# Patient Record
Sex: MALE | Race: WHITE | NOT HISPANIC OR LATINO | Employment: OTHER | URBAN - METROPOLITAN AREA
[De-identification: names, ages, dates, MRNs, and addresses within clinical notes are randomized per-mention and may not be internally consistent; named-entity substitution may affect disease eponyms.]

---

## 2017-01-06 ENCOUNTER — TRANSFERRED RECORDS (OUTPATIENT)
Dept: HEALTH INFORMATION MANAGEMENT | Facility: CLINIC | Age: 72
End: 2017-01-06

## 2017-01-09 ENCOUNTER — OFFICE VISIT (OUTPATIENT)
Dept: UROLOGY | Facility: CLINIC | Age: 72
End: 2017-01-09

## 2017-01-09 ENCOUNTER — TRANSFERRED RECORDS (OUTPATIENT)
Dept: HEALTH INFORMATION MANAGEMENT | Facility: CLINIC | Age: 72
End: 2017-01-09

## 2017-01-09 VITALS
WEIGHT: 164 LBS | SYSTOLIC BLOOD PRESSURE: 128 MMHG | BODY MASS INDEX: 23.48 KG/M2 | HEIGHT: 70 IN | HEART RATE: 60 BPM | DIASTOLIC BLOOD PRESSURE: 72 MMHG

## 2017-01-09 DIAGNOSIS — R39.9 LOWER URINARY TRACT SYMPTOMS (LUTS): Primary | ICD-10-CM

## 2017-01-09 LAB — EJECTION FRACTION: 60

## 2017-01-09 ASSESSMENT — PAIN SCALES - GENERAL: PAINLEVEL: NO PAIN (0)

## 2017-01-09 NOTE — MR AVS SNAPSHOT
After Visit Summary   1/9/2017    Chester Palacios    MRN: [id number]           Patient Information     Date Of Birth          1945        Visit Information        Provider Department      1/9/2017 8:30 AM Guillemro Robb DO [facility] and [facility] for Prostate and Urologic Cancers        Today's Diagnoses     Lower urinary tract symptoms (LUTS)    -  1       Care Instructions    Follow up on 6/12/17. Arrive to clinic with a full bladder for a urine test    It was a pleasure meeting with you today.  Thank you for allowing me and my team the privilege of caring for you today.  YOU are the reason we are here, and I truly hope we provided you with the excellent service you deserve.  Please let us know if there is anything else we can do for you so that we can be sure you are leaving completely satisfied with your care experience.                Follow-ups after your visit        Follow-up notes from your care team     Return in about 6 months (around 7/9/2017).      Your next 10 appointments already scheduled     Jun 12, 2017  8:00 AM   (Arrive by 7:45 AM)   Return Visit with Guillermo Robb DO   [facility] for Prostate and Urologic Cancers ([facility])    [address] [place] [id number] [phone]              Who to contact     Please call your clinic at [phone] to:    Ask questions about your health    Make or cancel appointments    Discuss your medicines    Learn about your test results    Speak to your doctor   If you have compliments or concerns about an experience at your clinic, or if you wish to file a complaint, please contact [facility] Patient Relations at [phone] or email us at [email or web address]         Additional Information About Your Visit        MyChart Information     [employer] gives you secure access to your electronic health record. If you see a  "primary care provider, you can also send messages to your care team and make appointments. If you have questions, please call your primary care clinic.  If you do not have a primary care provider, please call [phone] and they will assist you.      [employer] is an electronic gateway that provides easy, online access to your medical records. With [employer], you can request a clinic appointment, read your test results, renew a prescription or communicate with your care team.     To access your existing account, please contact your [facility] or call [phone] for assistance.        Care EveryWhere ID     This is your Care EveryWhere ID. This could be used by other organizations to access your [facility] medical records  [id number]        Your Vitals Were     Pulse Height BMI (Body Mass Index)             60 1.778 m (5' 10\") 23.53 kg/m2          Blood Pressure from Last 3 Encounters:   01/09/17 128/72   11/07/16 134/70   10/10/16 121/77    Weight from Last 3 Encounters:   01/09/17 74.39 kg (164 lb)   11/07/16 74.39 kg (164 lb)   10/10/16 74.844 kg (165 lb)              We Performed the Following     COMPLEX UROFLOWMETRY     POST-VOID RESIDUAL BLADDER SCAN        Primary Care Provider Office Phone # Fax #    Dariel Jack [phone]       [facility] [address] [facility] [address]        Thank you!     Thank you for choosing [facility] AND [facility] FOR PROSTATE AND UROLOGIC CANCERS  for your care. Our goal is always to provide you with excellent care. Hearing back from our patients is one way we can continue to improve our services. Please take a few minutes to complete the written survey that you may receive in the mail after your visit with us. Thank you!             Your Updated Medication List - Protect others around you: Learn how to safely use, store and throw away your medicines at www.disposemymeds.org.          This list is " accurate as of: 1/9/17  9:43 AM.  Always use your most recent med list.                   Brand Name Dispense Instructions for use    aspirin 81 MG tablet      Take 81 mg by mouth daily       busPIRone 10 MG tablet    BUSPAR     Take 5 tablets by mouth daily.       diclofenac 1 % Gel topical gel    VOLTAREN    100 g    Place onto the skin as needed for moderate pain Patient uses prn on hands for playing [employer].       DOXEPIN HCL PO      Take 75 mg by mouth At Bedtime       simvastatin 40 MG tablet    ZOCOR    90 tablet    TAKE 1 TABLET EVERY NIGHT AT BEDTIME       solifenacin 5 MG tablet    VESICARE    30 tablet    Take 1 tablet (5 mg) by mouth daily       tamsulosin 0.4 MG capsule    FLOMAX    30 capsule    Take 1 capsule (0.4 mg) by mouth daily

## 2017-01-09 NOTE — PROGRESS NOTES
Urology Consultation - F/U  Name: Chester Palacios    MRN: [id number]   YOB: 1945                 Chief Complaint:   LUTS/Nocturia/Urgency          History of Present Illness:   Interval history:  Better flow and less urgency episodes after starting vesicare and flomax 10/2016.    He continues to wake up several times a night to void, however, it's more likely his issues sleeping rather than his desire to void.     Does still have some urinary frequency, although not terribly bothersome.    His voiding diary revealed small volume voids ( cc), and he voided about 4-5 times overnight. He did not record his voids during the day.    Prior history 10/10/16:  Mr. Chester Palacios is a 71 year old male seen in consultation for LUTS    Slow stream started 1 year ago, gradually, not noticeable until PCP brought issue up. He has a limited memory of his previous urologic history, but likely had LUTS back in 5028-2851 and an outside urologist in [place] treated him with an office TUNA procedure. He was not placed on medications before TUNA. No major issues from 1181-7153/15.    For the past 2 years he's had gradually worsening Q2H frequency, frequent urgency with weekly episodes of urge urinary incontinence. Nocturia 4-5x/night, small volume. Due to lack of sleep is taking nightly doxepin (was on trazodone before - developed self resolving priapism)    +weak stream, occasional feeling of incomplete emptying, nocturia 4-5 (sm volume)  -hesitancy, intermittency, dysuria, hematuria    AUASS: 20/35 QOL: 4  MENDEZ: Normal/25         Past Medical History:   No past medical history on file.  None         Past Surgical History:     Past Surgical History   Procedure Laterality Date     Prostate surgery  2007     TUNA for BPH     Orthopedic surgery       R elbow Orif     Hernia repair  1985     Inguinal     Tonsillectomy       age 5     Joint replacement, hip rt/lt Right 1/16     Joint Replacement Hip RT/LT  "           Social History:     Social History   Substance Use Topics     Smoking status: Never Smoker      Smokeless tobacco: Never Used     Alcohol Use: Yes      Comment: 2-3 drinks weekly            Family History:     Family History   Problem Relation Age of Onset     Genetic Disorder Brother      +HFE gene mutation, but does not have hemochromatosis, but has phlebotomies     HEART DISEASE Father       age 56; had chest pain,  in the hospital soon after; was a smoker     Blood Disease Mother      hemolytic anemia;  age 85            Allergies:     Allergies   Allergen Reactions     Animal Dander      cats            Medications:     Current Outpatient Prescriptions   Medication Sig     solifenacin (VESICARE) 5 MG tablet Take 1 tablet (5 mg) by mouth daily     tamsulosin (FLOMAX) 0.4 MG capsule Take 1 capsule (0.4 mg) by mouth daily     simvastatin (ZOCOR) 40 MG tablet TAKE 1 TABLET EVERY NIGHT AT BEDTIME     DOXEPIN HCL PO Take 75 mg by mouth At Bedtime     aspirin 81 MG tablet Take 81 mg by mouth daily     diclofenac (VOLTAREN) 1 % GEL Place onto the skin as needed for moderate pain Patient uses prn on hands for playing [employer].     busPIRone (BUSPAR) 10 MG tablet Take 5 tablets by mouth daily.     No current facility-administered medications for this visit.             Review of Systems:    ROS: 10 point ROS neg other than the symptoms noted above in the HPI and PMH.          Physical Exam:   B/P: 121/77, T: Data Unavailable, P: 60, R: Data Unavailable  Estimated body mass index is 23.53 kg/(m^2) as calculated from the following:    Height as of this encounter: 1.778 m (5' 10\").    Weight as of this encounter: 74.39 kg (164 lb).  General: age-appropriate appearing male in NAD. thin body habitus.  HEENT: Head AT/NC, EOMI, CN Grossly intact  Resp: unlabored, no audible wheezes  Back: bony spine is non-tender, flanks are nontender  Abdomen: no obesity , soft, non-distended, non-tender. No organomegaly. " Surgical scars include: well healed b/l IH repairs  : testicles normal without atrophy or masses, no hernias and penis normal without urethral discharge  Rectal exam: mildly enlarged gland, smooth, no nodules  LE: warm and well perfused, no edema.   Neuro: grossly intact  Motor: excellent strength throughout  Skin: clear of rashes or ecchymoses.        Labs:    All laboratory data reviewed with patient  UA 8/29 N-W0-2R0-2    Urinary Flow Rate (10/2016)  Peak Flow: 7.9 mL/s  Average Flow: 3.2 mL/s  Voided (mL): 110 mL  Residual Volume by Ultrasound: 30 mL  Character of Curve: Plateau    Urinary Flow Rate (1/9/2017)  Peak Flow: 7.8 mL/s  Average Flow: 3.9 mL/s  Voided (mL): 134 mL  Residual Volume by Ultrasound: 110 mL        Imaging:    none           Assessment and Plan:   71 year old male with LUTS, urgency/UUI, nocturia.     -Will work up further for outlet obstruction if develops incomplete emptying, worsening frequency, or develops obstructive voiding symptoms, which are not currently present (cystoscopy in office).  -Needs further evaluation to help with his issues sleeping  -Continue flomax/vesicare  -Uroflow unchanged, but he feels like his flow is much better. Acceptable PVR of 110cc    F/U: 6 months with Julianna Cummings PA-C    I spent 30 minutes with the patient discussing the above mentioned findings and reviewing the assessment and plan, greater than 50% of which was spent on counseling. All questions were answered to the patients satisfaction.    Guillermo Robb DO  Fellow/Instructor  Department of Urology  1/9/17

## 2017-01-09 NOTE — PATIENT INSTRUCTIONS
Follow up on 6/12/17. Arrive to clinic with a full bladder for a urine test    It was a pleasure meeting with you today.  Thank you for allowing me and my team the privilege of caring for you today.  YOU are the reason we are here, and I truly hope we provided you with the excellent service you deserve.  Please let us know if there is anything else we can do for you so that we can be sure you are leaving completely satisfied with your care experience.

## 2017-01-09 NOTE — Clinical Note
1/9/2017       RE: Chester Palacios  [address]  [facility] [address]     Dear Colleague,    Thank you for referring your patient, Chester Palacios, to the [facility] UROLOGY AND INST FOR PROSTATE AND UROLOGIC CANCERS at [facility]. Please see a copy of my visit note below.    Urology Consultation - F/U  Name: Chester Palacios    MRN: [id number]   YOB: 1945                 Chief Complaint:   LUTS/Nocturia/Urgency          History of Present Illness:   Interval history:  Better flow and less urgency episodes after starting vesicare and flomax 10/2016.    He continues to wake up several times a night to void, however, it's more likely his issues sleeping rather than his desire to void.     Does still have some urinary frequency, although not terribly bothersome.    His voiding diary revealed small volume voids ( cc), and he voided about 4-5 times overnight. He did not record his voids during the day.    Prior history 10/10/16:  Mr. Chester Palacios is a 71 year old male seen in consultation for LUTS    Slow stream started 1 year ago, gradually, not noticeable until PCP brought issue up. He has a limited memory of his previous urologic history, but likely had LUTS back in 3628-6571 and an outside urologist in [place] treated him with an office TUNA procedure. He was not placed on medications before TUNA. No major issues from 1061-1106/15.    For the past 2 years he's had gradually worsening Q2H frequency, frequent urgency with weekly episodes of urge urinary incontinence. Nocturia 4-5x/night, small volume. Due to lack of sleep is taking nightly doxepin (was on trazodone before - developed self resolving priapism)    +weak stream, occasional feeling of incomplete emptying, nocturia 4-5 (sm volume)  -hesitancy, intermittency, dysuria, hematuria    AUASS: 20/35 QOL: 4  MENDEZ: Normal/25         Past Medical History:   No past medical history on  "file.  None         Past Surgical History:     Past Surgical History   Procedure Laterality Date     Prostate surgery       TUNA for BPH     Orthopedic surgery       R elbow Orif     Hernia repair  1985     Inguinal     Tonsillectomy       age 5     Joint replacement, hip rt/lt Right      Joint Replacement Hip RT/LT            Social History:     Social History   Substance Use Topics     Smoking status: Never Smoker      Smokeless tobacco: Never Used     Alcohol Use: Yes      Comment: 2-3 drinks weekly            Family History:     Family History   Problem Relation Age of Onset     Genetic Disorder Brother      +HFE gene mutation, but does not have hemochromatosis, but has phlebotomies     HEART DISEASE Father       age 56; had chest pain,  in the hospital soon after; was a smoker     Blood Disease Mother      hemolytic anemia;  age 85            Allergies:     Allergies   Allergen Reactions     Animal Dander      cats            Medications:     Current Outpatient Prescriptions   Medication Sig     solifenacin (VESICARE) 5 MG tablet Take 1 tablet (5 mg) by mouth daily     tamsulosin (FLOMAX) 0.4 MG capsule Take 1 capsule (0.4 mg) by mouth daily     simvastatin (ZOCOR) 40 MG tablet TAKE 1 TABLET EVERY NIGHT AT BEDTIME     DOXEPIN HCL PO Take 75 mg by mouth At Bedtime     aspirin 81 MG tablet Take 81 mg by mouth daily     diclofenac (VOLTAREN) 1 % GEL Place onto the skin as needed for moderate pain Patient uses prn on hands for playing guitar.     busPIRone (BUSPAR) 10 MG tablet Take 5 tablets by mouth daily.     No current facility-administered medications for this visit.             Review of Systems:    ROS: 10 point ROS neg other than the symptoms noted above in the HPI and PMH.          Physical Exam:   B/P: 121/77, T: Data Unavailable, P: 60, R: Data Unavailable  Estimated body mass index is 23.53 kg/(m^2) as calculated from the following:    Height as of this encounter: 1.778 m (5' 10\").   "  Weight as of this encounter: 74.39 kg (164 lb).  General: age-appropriate appearing male in NAD. thin body habitus.  HEENT: Head AT/NC, EOMI, CN Grossly intact  Resp: unlabored, no audible wheezes  Back: bony spine is non-tender, flanks are nontender  Abdomen: no obesity , soft, non-distended, non-tender. No organomegaly. Surgical scars include: well healed b/l IH repairs  : testicles normal without atrophy or masses, no hernias and penis normal without urethral discharge  Rectal exam: mildly enlarged gland, smooth, no nodules  LE: warm and well perfused, no edema.   Neuro: grossly intact  Motor: excellent strength throughout  Skin: clear of rashes or ecchymoses.        Labs:    All laboratory data reviewed with patient  UA 8/29 N-W0-2R0-2    Urinary Flow Rate (10/2016)  Peak Flow: 7.9 mL/s  Average Flow: 3.2 mL/s  Voided (mL): 110 mL  Residual Volume by Ultrasound: 30 mL  Character of Curve: Plateau    Urinary Flow Rate (1/9/2017)  Peak Flow: 7.8 mL/s  Average Flow: 3.9 mL/s  Voided (mL): 134 mL  Residual Volume by Ultrasound: 110 mL        Imaging:    none           Assessment and Plan:   71 year old male with LUTS, urgency/UUI, nocturia.     -Will work up further for outlet obstruction if develops incomplete emptying, worsening frequency, or develops obstructive voiding symptoms, which are not currently present (cystoscopy in office).  -Needs further evaluation to help with his issues sleeping  -Continue flomax/vesicare  -Uroflow unchanged, but he feels like his flow is much better. Acceptable PVR of 110cc    F/U: 6 months with Julianna Cummings PA-C    I spent 30 minutes with the patient discussing the above mentioned findings and reviewing the assessment and plan, greater than 50% of which was spent on counseling. All questions were answered to the patients satisfaction.    Guillermo Robb DO  Fellow/Instructor  Department of Urology  1/9/17         Again, thank you for allowing me to participate in the care of  your patient.      Sincerely,    Guillermo Robb, DO

## 2017-01-09 NOTE — NURSING NOTE
Chief Complaint   Patient presents with     RECHECK     LUTS     PVR was obtained via ultrasound    Benito LLAA

## 2017-01-12 ENCOUNTER — TELEPHONE (OUTPATIENT)
Dept: FAMILY MEDICINE | Facility: CLINIC | Age: 72
End: 2017-01-12

## 2017-01-12 ENCOUNTER — TELEPHONE (OUTPATIENT)
Dept: UROLOGY | Facility: CLINIC | Age: 72
End: 2017-01-12

## 2017-01-12 DIAGNOSIS — R73.01 IFG (IMPAIRED FASTING GLUCOSE): ICD-10-CM

## 2017-01-12 DIAGNOSIS — E78.5 HYPERLIPIDEMIA WITH TARGET LDL LESS THAN 130: Primary | ICD-10-CM

## 2017-01-12 RX ORDER — ATORVASTATIN CALCIUM 40 MG/1
40 TABLET, FILM COATED ORAL DAILY
Qty: 90 TABLET | Refills: 3 | Status: SHIPPED | OUTPATIENT
Start: 2017-01-12 | End: 2017-12-24

## 2017-01-12 NOTE — TELEPHONE ENCOUNTER
Patient called to inform PCP he had MRI and appt with cardiologist. States he may need to change from simvastatin to Lipitor.   Please review care everywhere notes and advise if change appropriate.

## 2017-01-12 NOTE — TELEPHONE ENCOUNTER
Reason for Call:  Call back    Detailed comments: Patient saw a Cardiologist and the test came back normal. Patient states that it did show some blockage and the Cardiologist suggests he gets prescribed Lipitor. Please call to discuss and let patient know if he needs an appointment. Patient states that it would be replacing his Simvastatin.     Phone Number Patient can be reached at: Home number on file [phone] (home)    Best Time: Any    Can we leave a detailed message on this number? YES    Call taken on 1/12/2017 at 1:23 PM by KALYN CHASE

## 2017-01-12 NOTE — TELEPHONE ENCOUNTER
Noted. Called [facility] for records.   Rx for Lipitor sent with dose below. D/c simvastatin.  Patient was informed of above.

## 2017-01-12 NOTE — TELEPHONE ENCOUNTER
FYI:         I, or any provider, can only review care everywhere during a live patient visit.                                         I did receive a faxed letter from Cardiology; the cardiac MRI had not been done yet.      I agree with switching to atorvastatin 40 mg per day. Please let the patient know.

## 2017-01-12 NOTE — TELEPHONE ENCOUNTER
Dr. Robb,    This patient called triage wondering if he could be worked up and have a cystoscopy to determine what surgery he needs to have his symptoms improved. Patient also stated that Vesicare is over $200 per month. Is there something he could try that is more affordable?  Please advise.    -Deisy Avila LPN

## 2017-01-24 DIAGNOSIS — N32.81 OVERACTIVE BLADDER: Primary | ICD-10-CM

## 2017-01-24 RX ORDER — TAMSULOSIN HYDROCHLORIDE 0.4 MG/1
0.4 CAPSULE ORAL DAILY
Qty: 30 CAPSULE | Refills: 1 | Status: SHIPPED | OUTPATIENT
Start: 2017-01-24 | End: 2017-04-05

## 2017-01-30 ENCOUNTER — PRE VISIT (OUTPATIENT)
Dept: UROLOGY | Facility: CLINIC | Age: 72
End: 2017-01-30

## 2017-02-08 ENCOUNTER — OFFICE VISIT (OUTPATIENT)
Dept: UROLOGY | Facility: CLINIC | Age: 72
End: 2017-02-08

## 2017-02-08 VITALS
BODY MASS INDEX: 22.9 KG/M2 | HEART RATE: 55 BPM | SYSTOLIC BLOOD PRESSURE: 127 MMHG | HEIGHT: 70 IN | WEIGHT: 160 LBS | DIASTOLIC BLOOD PRESSURE: 70 MMHG

## 2017-02-08 DIAGNOSIS — R39.9 LOWER URINARY TRACT SYMPTOMS (LUTS): Primary | ICD-10-CM

## 2017-02-08 DIAGNOSIS — R35.0 URINARY FREQUENCY: ICD-10-CM

## 2017-02-08 RX ORDER — CEFAZOLIN SODIUM 1 G/3ML
1 INJECTION, POWDER, FOR SOLUTION INTRAMUSCULAR; INTRAVENOUS SEE ADMIN INSTRUCTIONS
Status: CANCELLED | OUTPATIENT
Start: 2017-02-08

## 2017-02-08 ASSESSMENT — PAIN SCALES - GENERAL: PAINLEVEL: NO PAIN (0)

## 2017-02-08 NOTE — NURSING NOTE
Invasive Procedure Safety Checklist:    Procedure: Cystoscopy    Action: Complete sections and checkboxes as appropriate.    Pre-procedure:  1. Patient ID Verified with 2 identifiers (Radha and  or MRN) : YES    2. Procedure and site verified with patient/designee (when able) : YES    3. Accurate consent documentation in medical record : YES    4. H&P (or appropriate assessment) documented in medical record : NO  H&P must be up to 30 days prior to procedure an updated within 24 hours of                 Procedure as applicable.     5. Relevant diagnostic and radiology test results appropriately labeled and displayed as applicable : NO    6. Blood products, implants, devices, and/or special equipment available for the procedure as applicable : NO    7. Procedure site(s) marked with provider initials [Exclusions: ] : NO    8. Marking not required. Reason : Yes  Procedure does not require site marking    Time Out:     Time-Out performed immediately prior to starting procedure, including verbal and active participation of all team members addressing: YES    1. Correct patient identity.  2. Confirmed that the correct side and site are marked.  3. An accurate procedure to be done.  4. Agreement on the procedure to be done.  5. Correct patient position.  6. Relevant images and results are properly labeled and appropriately displayed.  7. The need to administer antibiotics or fluids for irrigation purposes during the procedure as applicable.  8. Safety precautions based on patient history or medication use.    During Procedure: Verification of correct person, site, and procedure occurs any time the responsibility for care of the patient is transferred to another member of the care team.    SPIKE Quinn

## 2017-02-08 NOTE — Clinical Note
2017       RE: Chester Palacios  [address]  [facility] [place] [address]     Dear Colleague,    Thank you for referring your patient, Chester Palacios, to the [facility] FOR PROSTATE AND UROLOGIC CANCERS at [facility]. Please see a copy of my visit note below.    Office Visit Note      UROLOGIC DIAGNOSES:   Frequency, urgency, nocturia     CURRENT INTERVENTIONS:       HISTORY:   Patient presents for follow up for lower urinary tract symptoms.   He is s/p TUNA ~ ten years ago.   Currently taking tamsulosin and vesicare for lower urinary tract symptoms.   Patient notes some improvement but would like to consider outlet procedure for further improvement in symptoms.       Uroflow today:  Qmax 10.7 ml/s    PVR was 150ml       We discussed options of Rezum, Uro-lift, PVP, TURP particularly in light of previous TUNA.   We also discussed work up prior to outlet procedure including cystoscopy +/- UDS.     Patient opts for cystoscopy today.         PAST MEDICAL HISTORY: History reviewed. No pertinent past medical history.    PAST SURGICAL HISTORY:   Past Surgical History   Procedure Laterality Date     Prostate surgery       TUNA for BPH     Orthopedic surgery       R elbow Orif     Hernia repair       Inguinal     Tonsillectomy       age 5     Joint replacement, hip rt/lt Right      Joint Replacement Hip RT/LT       FAMILY HISTORY:   Family History   Problem Relation Age of Onset     Genetic Disorder Brother      +HFE gene mutation, but does not have hemochromatosis, but has phlebotomies     HEART DISEASE Father       age 56; had chest pain,  in the hospital soon after; was a smoker     Blood Disease Mother      hemolytic anemia;  age 85       SOCIAL HISTORY:   Social History   Substance Use Topics     Smoking status: Never Smoker      Smokeless tobacco: Never Used     Alcohol Use: Yes      Comment: 2-3 drinks weekly       Current  "Outpatient Prescriptions   Medication     tamsulosin (FLOMAX) 0.4 MG capsule     atorvastatin (LIPITOR) 40 MG tablet     solifenacin (VESICARE) 5 MG tablet     DOXEPIN HCL PO     aspirin 81 MG tablet     diclofenac (VOLTAREN) 1 % GEL     busPIRone (BUSPAR) 10 MG tablet     No current facility-administered medications for this visit.         PHYSICAL EXAM:    /70 mmHg  Pulse 55  Ht 1.778 m (5' 10\")  Wt 72.576 kg (160 lb)  BMI 22.96 kg/m2    HEENT: Normocephalic and atraumatic   Cardiac: Not done  Back/Flank: Not done  CNS/PNS: Not done  Respiratory: Normal non-labored breathing  Abdomen: Soft nontender and nondistended  Peripheral Vascular: Not done  Mental Status: Not done    Penis: Not done  Scrotal Skin: Not done  Testicles: Not done  Epididymis: Not done  Digital Rectal Exam:     Cystoscopy:     Cystomale    February 8, 2017 CYSTOSCOPY:  The patient was brought to the procedures room where he was placed in the supine position.  He was prepped and draped in a sterile fashion.  A #16-Slovenian flexible cystoscope was introduced into the urinary bladder.  The anterior urethra and membranous urethra were negative.  The prostatic urethra was obstructing with some enlargement of the lateral lobes.  Within the urinary bladder, there was no evidence of stones, tumors, or growths.  The ureteral orifices were well visualized bilaterally and found to have clear efflux of urine.  There was no evidence of recurrence.  The patient had grade 1 trabeculation.    On retroflex view, no lesions were seen.       Imaging: None    Urinalysis: UA RESULTS:  Recent Labs   Lab Test  08/29/16   1020   COLOR  Yellow   APPEARANCE  Clear   URINEGLC  Negative   URINEBILI  Negative   URINEKETONE  Negative   SG  1.025   UBLD  Trace*   URINEPH  6.0   PROTEIN  Negative   UROBILINOGEN  0.2   NITRITE  Negative   LEUKEST  Negative   RBCU  O - 2   WBCU  O - 2       PSA:     Post Void Residual: 150ml    Other labs: None " today      IMPRESSION:  72 y/o M with lower urinary tract symptoms      PLAN:  Will schedule for TURP next available, after further discussion.   Noted that PVP would likely worsen patient's irritative symptoms in the short term. Rezum is an option but unclear the benefit as patient had previous TUNA       Total Time: 15 minutes                                     Total in Consultation: greater than 50%   The time noted above was separate from the time spent performing cystoscopy.                    Again, thank you for allowing me to participate in the care of your patient.      Sincerely,    Rosa Macias MD

## 2017-02-08 NOTE — MR AVS SNAPSHOT
After Visit Summary   2/8/2017    Chester Palacios    MRN: [id number]           Patient Information     Date Of Birth          1945        Visit Information        Provider Department      2/8/2017 8:45 AM Rosa Macias MD [facility] for Prostate and Urologic Cancers        Today's Diagnoses     Lower urinary tract symptoms (LUTS)    -  1     Urinary frequency           Care Instructions    Schedule surgery with Dr. Macias.    It was a pleasure meeting with you today.  Thank you for allowing me and my team the privilege of caring for you today.  YOU are the reason we are here, and I truly hope we provided you with the excellent service you deserve.  Please let us know if there is anything else we can do for you so that we can be sure you are leaving completely satisfied with your care experience.      Queta Sotomayor LORENA        Follow-ups after your visit        Additional Services     PAC Visit Referral (For [facility] Only)       Does this visit require an Anesthesia consult?  No -  If this visit is not for evaluation for co-morbidity (such as patient request due to anxiety), what is the purpose of the visit?: H and P     H&P done by:  N/A      Please be aware that coverage of these services is subject to the terms and limitations of your health insurance plan.  Call member services at your health plan with any benefit or coverage questions.      Please bring the following to your appointment:  >>   Any x-rays, CTs or MRIs which have been performed.  Contact the facility where they were done to arrange for  prior to your scheduled appointment.  Any new CT, MRI or other procedures ordered by your specialist must be performed at a [facility] facility or coordinated by your clinic's referral office.    >>   List of current medications  >>   This referral request   >>   Any documents/labs given to you for this referral                  Your next 10 appointments  already scheduled     Apr 03, 2017  1:30 PM   Office Visit with Dariel Jack MD   [facility]    [address]  [address]  [facility] [address] [phone]           Bring a current list of meds and any records pertaining to this visit.  For Physicals, please bring immunization records and any forms needing to be filled out.  Please arrive 10 minutes early to complete paperwork.              Who to contact     Please call your clinic at [phone] to:    Ask questions about your health    Make or cancel appointments    Discuss your medicines    Learn about your test results    Speak to your doctor   If you have compliments or concerns about an experience at your clinic, or if you wish to file a complaint, please contact [facility] Patient Relations at [phone] or email us at [email or web address]         Additional Information About Your Visit        [employer] Makeup Information     [employer] gives you secure access to your electronic health record. If you see a primary care provider, you can also send messages to your care team and make appointments. If you have questions, please call your primary care clinic.  If you do not have a primary care provider, please call [phone] and they will assist you.      [employer] is an electronic gateway that provides easy, online access to your medical records. With [employer], you can request a clinic appointment, read your test results, renew a prescription or communicate with your care team.     To access your existing account, please contact your [facility] or call [phone] for assistance.        Care EveryWhere ID     This is your Care EveryWhere ID. This could be used by other organizations to access your [facility] medical records  [id number]        Your Vitals Were     Pulse Height BMI (Body Mass Index)        "      55 1.778 m (5' 10\") 22.96 kg/m2          Blood Pressure from Last 3 Encounters:   02/08/17 127/70   01/09/17 128/72   11/07/16 134/70    Weight from Last 3 Encounters:   02/08/17 72.576 kg (160 lb)   01/09/17 74.39 kg (164 lb)   11/07/16 74.39 kg (164 lb)              We Performed the Following     COMPLEX UROFLOWMETRY     PAC Visit Referral (For [facility] Only)     Shelly-Operative Worksheet  (Urology General)     POST-VOID RESIDUAL BLADDER SCAN        Primary Care Provider Office Phone # Fax #    Dariel Jack [phone]       [facility]NE[facility] [place] [facility] [address]  [facility] [address]        Thank you!     Thank you for choosing [facility] UROLOGY AND [facility] FOR PROSTATE AND UROLOGIC CANCERS  for your care. Our goal is always to provide you with excellent care. Hearing back from our patients is one way we can continue to improve our services. Please take a few minutes to complete the written survey that you may receive in the mail after your visit with us. Thank you!             Your Updated Medication List - Protect others around you: Learn how to safely use, store and throw away your medicines at www.disposemymeds.org.          This list is accurate as of: 2/8/17 10:31 AM.  Always use your most recent med list.                   Brand Name Dispense Instructions for use    aspirin 81 MG tablet      Take 81 mg by mouth daily       atorvastatin 40 MG tablet    LIPITOR    90 tablet    Take 1 tablet (40 mg) by mouth daily       busPIRone 10 MG tablet    BUSPAR     Take 5 tablets by mouth daily.       diclofenac 1 % Gel topical gel    VOLTAREN    100 g    Place onto the skin as needed for moderate pain Patient uses prn on hands for playing Pulse 8.       DOXEPIN HCL PO      Take 75 mg by mouth At Bedtime       solifenacin 5 MG tablet    VESICARE    30 tablet    Take 1 tablet (5 mg) by mouth daily       tamsulosin 0.4 MG capsule    FLOMAX    30 capsule    Take 1 capsule (0.4 mg) by mouth daily "

## 2017-02-08 NOTE — NURSING NOTE
"Chief Complaint   Patient presents with     RECHECK     Follow up- LUTS.  Discuss surgical options       Initial Ht 1.778 m (5' 10\")  Wt 72.576 kg (160 lb)  BMI 22.96 kg/m2 Estimated body mass index is 22.96 kg/(m^2) as calculated from the following:    Height as of this encounter: 1.778 m (5' 10\").    Weight as of this encounter: 72.576 kg (160 lb).  Medication Reconciliation: complete     SPIKE Quinn    "

## 2017-02-08 NOTE — PROGRESS NOTES
Office Visit Note      UROLOGIC DIAGNOSES:   Frequency, urgency, nocturia     CURRENT INTERVENTIONS:       HISTORY:   Patient presents for follow up for lower urinary tract symptoms.   He is s/p TUNA ~ ten years ago.   Currently taking tamsulosin and vesicare for lower urinary tract symptoms.   Patient notes some improvement but would like to consider outlet procedure for further improvement in symptoms.       Uroflow today:  Qmax 10.7 ml/s    PVR was 150ml       We discussed options of Rezum, Uro-lift, PVP, TURP particularly in light of previous TUNA.   We also discussed work up prior to outlet procedure including cystoscopy +/- UDS.     Patient opts for cystoscopy today.         PAST MEDICAL HISTORY: History reviewed. No pertinent past medical history.    PAST SURGICAL HISTORY:   Past Surgical History   Procedure Laterality Date     Prostate surgery       TUNA for BPH     Orthopedic surgery       R elbow Orif     Hernia repair  1985     Inguinal     Tonsillectomy       age 5     Joint replacement, hip rt/lt Right      Joint Replacement Hip RT/LT       FAMILY HISTORY:   Family History   Problem Relation Age of Onset     Genetic Disorder Brother      +HFE gene mutation, but does not have hemochromatosis, but has phlebotomies     HEART DISEASE Father       age 56; had chest pain,  in the hospital soon after; was a smoker     Blood Disease Mother      hemolytic anemia;  age 85       SOCIAL HISTORY:   Social History   Substance Use Topics     Smoking status: Never Smoker      Smokeless tobacco: Never Used     Alcohol Use: Yes      Comment: 2-3 drinks weekly       Current Outpatient Prescriptions   Medication     tamsulosin (FLOMAX) 0.4 MG capsule     atorvastatin (LIPITOR) 40 MG tablet     solifenacin (VESICARE) 5 MG tablet     DOXEPIN HCL PO     aspirin 81 MG tablet     diclofenac (VOLTAREN) 1 % GEL     busPIRone (BUSPAR) 10 MG tablet     No current facility-administered medications for this visit.  "        PHYSICAL EXAM:    /70 mmHg  Pulse 55  Ht 1.778 m (5' 10\")  Wt 72.576 kg (160 lb)  BMI 22.96 kg/m2    HEENT: Normocephalic and atraumatic   Cardiac: Not done  Back/Flank: Not done  CNS/PNS: Not done  Respiratory: Normal non-labored breathing  Abdomen: Soft nontender and nondistended  Peripheral Vascular: Not done  Mental Status: Not done    Penis: Not done  Scrotal Skin: Not done  Testicles: Not done  Epididymis: Not done  Digital Rectal Exam:     Cystoscopy:     Alvin    February 8, 2017 CYSTOSCOPY:  The patient was brought to the procedures room where he was placed in the supine position.  He was prepped and draped in a sterile fashion.  A #16-Gambian flexible cystoscope was introduced into the urinary bladder.  The anterior urethra and membranous urethra were negative.  The prostatic urethra was obstructing with some enlargement of the lateral lobes.  Within the urinary bladder, there was no evidence of stones, tumors, or growths.  The ureteral orifices were well visualized bilaterally and found to have clear efflux of urine.  There was no evidence of recurrence.  The patient had grade 1 trabeculation.    On retroflex view, no lesions were seen.       Imaging: None    Urinalysis: UA RESULTS:  Recent Labs   Lab Test  08/29/16   1020   COLOR  Yellow   APPEARANCE  Clear   URINEGLC  Negative   URINEBILI  Negative   URINEKETONE  Negative   SG  1.025   UBLD  Trace*   URINEPH  6.0   PROTEIN  Negative   UROBILINOGEN  0.2   NITRITE  Negative   LEUKEST  Negative   RBCU  O - 2   WBCU  O - 2       PSA:     Post Void Residual: 150ml    Other labs: None today      IMPRESSION:  70 y/o M with lower urinary tract symptoms      PLAN:  Will schedule for TURP next available, after further discussion.   Noted that PVP would likely worsen patient's irritative symptoms in the short term. Rezum is an option but unclear the benefit as patient had previous TUNA       Total Time: 15 minutes                               "       Total in Consultation: greater than 50%   The time noted above was separate from the time spent performing cystoscopy.

## 2017-02-08 NOTE — PATIENT INSTRUCTIONS
Schedule surgery with Dr. Macias.    It was a pleasure meeting with you today.  Thank you for allowing me and my team the privilege of caring for you today.  YOU are the reason we are here, and I truly hope we provided you with the excellent service you deserve.  Please let us know if there is anything else we can do for you so that we can be sure you are leaving completely satisfied with your care experience.      SPIKE Quinn

## 2017-02-13 ENCOUNTER — TELEPHONE (OUTPATIENT)
Dept: UROLOGY | Facility: CLINIC | Age: 72
End: 2017-02-13

## 2017-02-13 ENCOUNTER — DOCUMENTATION ONLY (OUTPATIENT)
Dept: UROLOGY | Facility: CLINIC | Age: 72
End: 2017-02-13

## 2017-02-13 NOTE — TELEPHONE ENCOUNTER
Patient was in the Urgent care over the weekend with high fevers.   He was diagnosed with a UTI and strep throat.  Started (yesterday) on Cefuroxime 500mg BID x 10 days.  Urgent care MD also stopped his vesicare.    He is now having a weak stream and a very sudden urge to urinate to the point where at times he is incontinent.  He was having pain with urination but stated it was fine this morning.    Patient was wondering if he should restart the Vesicare?    He is scheduled for a TURP on 3.8.17 with Dr Macias.    Alexandra Osuna, RN-BC, BSN  Care Coordinator - Reconstructive Urology

## 2017-02-13 NOTE — TELEPHONE ENCOUNTER
Repeat urine culture but otherwise nothing.   No to the vesicare! I told him to stop it already, I thought, perhaps I didn't make it clear.   He needs a cysto and a TURP (likely).       Alexandra Osuna, LIANET-BC, BSN  Care Coordinator - Reconstructive Urology

## 2017-02-14 DIAGNOSIS — N32.81 OVERACTIVE BLADDER: ICD-10-CM

## 2017-02-14 RX ORDER — SOLIFENACIN SUCCINATE 5 MG/1
5 TABLET, FILM COATED ORAL DAILY
Qty: 30 TABLET | Refills: 3 | Status: ON HOLD | OUTPATIENT
Start: 2017-02-14 | End: 2017-03-09

## 2017-02-22 ENCOUNTER — ALLIED HEALTH/NURSE VISIT (OUTPATIENT)
Dept: SURGERY | Facility: CLINIC | Age: 72
End: 2017-02-22

## 2017-02-22 ENCOUNTER — ANESTHESIA EVENT (OUTPATIENT)
Dept: SURGERY | Facility: CLINIC | Age: 72
End: 2017-02-22
Payer: MEDICARE

## 2017-02-22 ENCOUNTER — OFFICE VISIT (OUTPATIENT)
Dept: SURGERY | Facility: CLINIC | Age: 72
End: 2017-02-22

## 2017-02-22 VITALS
BODY MASS INDEX: 23.78 KG/M2 | SYSTOLIC BLOOD PRESSURE: 118 MMHG | OXYGEN SATURATION: 96 % | HEIGHT: 70 IN | TEMPERATURE: 98 F | HEART RATE: 55 BPM | WEIGHT: 166.1 LBS | RESPIRATION RATE: 16 BRPM | DIASTOLIC BLOOD PRESSURE: 69 MMHG

## 2017-02-22 DIAGNOSIS — R39.9 LOWER URINARY TRACT SYMPTOMS (LUTS): ICD-10-CM

## 2017-02-22 DIAGNOSIS — Z01.818 PREOP EXAMINATION: Primary | ICD-10-CM

## 2017-02-22 DIAGNOSIS — R39.9 LOWER URINARY TRACT SYMPTOMS (LUTS): Primary | ICD-10-CM

## 2017-02-22 DIAGNOSIS — R30.0 DYSURIA: ICD-10-CM

## 2017-02-22 PROCEDURE — 87086 URINE CULTURE/COLONY COUNT: CPT | Performed by: UROLOGY

## 2017-02-22 RX ORDER — AMOXICILLIN 500 MG/1
TABLET, FILM COATED ORAL
COMMUNITY
End: 2018-03-26

## 2017-02-22 NOTE — ANESTHESIA PREPROCEDURE EVALUATION
Anesthesia Evaluation     . Pt has had prior anesthetic. Type: General and MAC    No history of anesthetic complications     ROS/MED HX    ENT/Pulmonary:  - neg pulmonary ROS     Neurologic:  - neg neurologic ROS     Cardiovascular:     (+) Dyslipidemia, ----. : . . . :. . Previous cardiac testing Echodate:2015results:Echo   Final Impressions:   1. Normal LV size, mild LVH, low normal function with an estimated EF of 50 - 55%.   2. Probable hypokinesis of the mid-apical lateral and inferolateral walls.   3. Normal RV size and function.   4. Normal valves.   5. No prior studies for comparison.  date: results:ECG reviewed date:2/22/17 results:Sinus bradycardia rate 47 date: results:          METS/Exercise Tolerance:  4 - Raking leaves, gardening   Hematologic:  - neg hematologic  ROS       Musculoskeletal:   (+) arthritis, , , other musculoskeletal- s/p right hip replacement      GI/Hepatic:  - neg GI/hepatic ROS       Renal/Genitourinary:     (+) BPH,       Endo:  - neg endo ROS       Psychiatric:     (+) psychiatric history anxiety      Infectious Disease:  - neg infectious disease ROS       Malignancy:      - no malignancy   Other:    (+) No chance of pregnancy C-spine cleared: N/A, no H/O Chronic Pain,no other significant disability              Physical Exam  Normal systems: dental    Airway   Mallampati: II  TM distance: >3 FB  Neck ROM: limited    Dental   (+) caps    Cardiovascular   Rhythm and rate: regular and normal      Pulmonary    breath sounds clear to auscultation    Other findings: CT Coronary arteries 12/2016  1. Atherosclerotic heart disease.  a.  Elevated calcium score of 347.4 (64th percentile for age and gender).  b.  Nonobstructive three vessel coronary artery disease.  2. Descending thoracic aortic atherosclerosis, scattered.  3. Please see the separate radiology dictation for noncardiovascular   Findings.    For further details of assessment, testing, and physical exam please see H and P  completed on same date.    #7 and #10 capped. Mouth opening 2 FB           PAC Discussion and Assessment    ASA Classification: 3  Case is suitable for: [place]  Anesthetic techniques and relevant risks discussed: GA  Invasive monitoring and risk discussed: No  Types:   Possibility and Risk of blood transfusion discussed: No  NPO instructions given:   Additional anesthetic preparation and risks discussed:   Needs early admission to pre-op area:   Other:     PAC Resident/NP Anesthesia Assessment:  Chester Palacios is a 71 year old male scheduled to undergo combined cystoscopy with TURP on 3/8/17 by Dr. Macias. He has the following specific operative considerations:   - RCRI : No serious cardiac risks.    - Risk of PONV score = 1.  If > 2, anti-emetic intervention recommended.    BMI 24. 75 kg. Last GA for right hip replacement 1/2016. No history of problems with anesthesia.       --Lower urinary tract symptoms. s/p TUNA 10 years ago. Above procedure now planned.   --HLD. Atorvastatin. Coronary calcifications on coronary CT, no obstructive disease. ASA 81 mg with planned hold for surgery. All testing above. Good activity tolerance. Swims regularly.   --Nonsmoker No pulmonary symptoms.   --Osteoarthritis. S/p right hip replacement. Pain in left hip and knee. Will require careful positioning.    --Anxiety. Will take Buspar on DOS. Doxepin at HS.   --Hearing loss with bilateral hearing aids.    Patient was discussed with Dr Wilson.        Reviewed and Signed by PAC Mid-Level Provider/Resident  Mid-Level Provider/Resident: MIL Olivares, BLANKA  Date: 2/22/17  Time: 8:48am    Attending Anesthesiologist Anesthesia Assessment:  Pt seen, qs answered.    Reviewed and Signed by PAC Anesthesiologist  Anesthesiologist: agnes  Date: 3/3/17  Time:   Pass/Fail:   Disposition:     PAC Pharmacist Assessment:        Pharmacist:   Date:   Time:      Anesthesia Plan      History & Physical Review  History and physical reviewed and  following examination; no interval change.    ASA Status:  2 .        Plan for General, ETT and LMA with Intravenous induction. Maintenance will be Balanced.         Procedure:   COMBINED CYSTOSCOPY, TRANSURETHRAL RESECTION (TUR) PROSTATE (N/A Urethra) Cystoscopy, Transurethral Resection Of The Prostate      Anesthesia type: General    Pre-op diagnosis: Urinary Frequency     [facility]    Surgeon: Rosa Macias MD    NP0 after 22:40Water at 0900    Plan: GA      Postoperative Care      Consents  Anesthetic plan, risks, benefits and alternatives discussed with:  Patient..        Riya Ruelas, APRN CNS Clinical Nurse Specialist Signed  H&P   Date of Service: 2/22/2017 10:00 AM Note Created: 2/22/2017  8:37 AM      Expand All Collapse All    []Hide copied text    Pre-Operative H & P      CC: Preoperative exam to assess for increased cardiopulmonary risk while undergoing surgery and anesthesia.     Date of Encounter: 2/22/2017  Primary Care Physician: Dariel Jack Oliver Palacios is a 71 year old male who presents for pre-operative H & P in preparation for combined cystoscopy and TURP with Dr. Macias on 3/8/17 at [facility]. History is obtained from the patient.      Patient who presented to Dr. Macias with lower urinary tract symptoms. He is s/p transurethral needle ablation (TUNA) of prostate 10 years ago which helped to some degree. He was counseled for above procedure.   Past Medical History   Past Medical History          Past Medical History   Diagnosis Date     Anxiety state 10/26/2012       Works with psychiatrist      Arthritis of right hip 12/21/2015     Coronary artery calcification seen on computed tomography 12/12/2016       See Cardiology consult 1/17; cardiac MRI shows EF 60%, no scar      Family history of hemochromatosis 10/25/2015       Brother; Chester had nml labs in 2010      Hyperlipidemia with target LDL  less than 130 10/26/2012       Diagnosis updated by automated process. Provider to review and confirm.     Lower urinary tract symptoms (LUTS) 11/5/2016       See urology consult 10/16      Osteoarthritis, unspecified osteoarthritis type, unspecified site 11/7/2016            Past Surgical History   Past Surgical History           Past Surgical History   Procedure Laterality Date     Prostate surgery   2007       TUNA for BPH     Orthopedic surgery           R elbow Orif     Hernia repair   1985       Inguinal     Tonsillectomy           age 5     Joint replacement, hip rt/lt Right 1/16       Joint Replacement Hip RT/LT            Hx of Blood transfusions/reactions: Denies.      Hx of abnormal bleeding or anti-platelet use: ASA 81 mg with planned one week hold     Menstrual history: No LMP for male patient.     Steroid use in the last year: Denies.     Personal or FH with difficulty with Anesthesia: Denies.     Prior to Admission Medications   Active Medications           Current Outpatient Prescriptions   Medication Sig Dispense Refill     amoxicillin (AMOXIL) 500 MG tablet Take 500 mg by mouth See Admin Instructions Take 2000 mg 60 min prior to dental work         solifenacin (VESICARE) 5 MG tablet Take 1 tablet (5 mg) by mouth daily (Patient taking differently: Take 5 mg by mouth every morning ) 30 tablet 3     tamsulosin (FLOMAX) 0.4 MG capsule Take 1 capsule (0.4 mg) by mouth daily (Patient taking differently: Take 0.4 mg by mouth every morning ) 30 capsule 1     atorvastatin (LIPITOR) 40 MG tablet Take 1 tablet (40 mg) by mouth daily (Patient taking differently: Take 40 mg by mouth every morning ) 90 tablet 3     DOXEPIN HCL PO Take by mouth At Bedtime Patient states that he takes 1.25 mL HS, he is unsure of the concentration.         aspirin 81 MG tablet Take 81 mg by mouth every morning          diclofenac (VOLTAREN) 1 % GEL Place onto the skin as needed for moderate pain Patient uses prn on hands for  "playing [employer]. 100 g 3     busPIRone (BUSPAR) 10 MG tablet Take by mouth 3 times daily 20 mg QA, 10 mg every afternoon, 20 mg HS                Allergies        Allergies   Allergen Reactions     Animal Dander         cats         Social History   Social History    Social History            Social History     Marital status:        Spouse name: N/A     Number of children: N/A     Years of education: N/A           Occupational History      Self Employed.              Social History Main Topics     Smoking status: Never Smoker     Smokeless tobacco: Never Used     Alcohol use Yes          Comment: 2-3 drinks weekly     Drug use: No     Sexual activity: Yes       Partners: Female           Other Topics Concern     Parent/Sibling W/ Cabg, Mi Or Angioplasty Before 65f 55m? Yes          Social History Narrative     Retired             Family History   Family History           Family History   Problem Relation Age of Onset     HEART DISEASE Father          age 56; had chest pain,  in the hospital soon after; was a smoker     Genetic Disorder Brother         +HFE gene mutation, but does not have hemochromatosis, but has phlebotomies     Blood Disease Mother         hemolytic anemia;  age 85            Review of Systems  The complete review of systems is negative other than noted in the HPI or here.   Constitutional: Denies fever, chills, weight loss.  HEENT: Wears glasses for vision. 1 week ago treated with 10 day course of antibiotics for strep throat. Symptoms resolved. [place] with bilateral hearing aids.  Respiratory: Denies cough or shortness of breath.  CV: Denies chest pain or irregular HR. Swims 1-2 times weekly.  GI: Denies abdominal pain or bowel issues.  : Denies dysuria.  M/S: s/p right hip replacement. Left hip and left knee pain.     Temp: 98  F (36.7  C) Temp src: Oral BP: 118/69 Pulse: 55   Resp: 16 SpO2: 96 %         166 lbs 1.6 oz 5' 10\" Body mass index is 23.83 " kg/(m^2).         Physical Exam  Constitutional: Awake, alert, cooperative, no apparent distress, and appears stated age. Thin.  Eyes: Pupils equal, round and reactive to light, extra ocular muscles intact, sclera clear, conjunctiva normal. Glasses on.  HENT: Normocephalic, oral pharynx with moist mucus membranes, good dentition. No goiter appreciated. Bilateral hearing aids.  Respiratory: Clear to auscultation bilaterally, no crackles or wheezing.  Cardiovascular: Regular rate and rhythm, normal S1 and S2, and no murmur noted. Carotids, no bruits. No edema. Palpable pulses to radial DP and PT arteries.   GI: Normal bowel sounds, soft, non-distended, non-tender, no masses palpated, no hepatosplenomegaly.   Lymph/Hematologic: No cervical lymphadenopathy and no supraclavicular lymphadenopathy.  Genitourinary: Deferred.  Skin: Warm and dry.   Musculoskeletal: Mildly limited neck extension. There is no redness, warmth, or swelling of the joints. Gross motor strength is normal.   Neurologic: Awake, alert, oriented to name, place and time. Cranial nerves II-XII are grossly intact. Gait is normal.   Neuropsychiatric: Calm, cooperative. Flat affect.      Labs: (personally reviewed) OSH 1/9/17   Cr 0.9  GFR >60  Hgb 10.1        Lab Results   Component Value Date     WBC 4.4 12/14/2016            Lab Results   Component Value Date     RBC 5.16 12/14/2016            Lab Results   Component Value Date     HGB 15.5 12/14/2016            Lab Results   Component Value Date     HCT 46.4 12/14/2016            Lab Results   Component Value Date     MCV 90 12/14/2016            Lab Results   Component Value Date     MCH 30.0 12/14/2016            Lab Results   Component Value Date     MCHC 33.4 12/14/2016            Lab Results   Component Value Date     RDW 12.4 12/14/2016            Lab Results   Component Value Date      12/14/2016         Last Basic Metabolic Panel:        Lab Results   Component Value Date       2016             Lab Results   Component Value Date     POTASSIUM 4.0 2016            Lab Results   Component Value Date     CHLORIDE 108 2016            Lab Results   Component Value Date     MARIANO 9.1 2016            Lab Results   Component Value Date     CO2 26 2016            Lab Results   Component Value Date     BUN 15 2016     BUN 22.2 10/10/2011            Lab Results   Component Value Date     CR 0.97 2016            Lab Results   Component Value Date      2016            Lab Results   Component Value Date     AST 19 2016            Lab Results   Component Value Date     ALT 25 2016      No results found for: BILICONJ         Lab Results   Component Value Date     BILITOTAL 0.6 2016            Lab Results   Component Value Date     ALBUMIN 3.8 2016            Lab Results   Component Value Date     PROTTOTAL 6.8 2016             Lab Results   Component Value Date     ALKPHOS 75 2016         UC pending per Dr. Macias  EKG: Personally reviewed but formal cardiology read pendin17 Sinus bradycardia, rate 47  Cardiac echo:   Echo    Final Impressions:  1. Normal LV size, mild LVH, low normal function with an estimated EF of 50 - 55%.  2. Probable hypokinesis of the mid-apical lateral and inferolateral walls.  3. Normal RV size and function.  4. Normal valves.  5. No prior studies for comparison.  Cardiac MR 2017  1. Upper normal LV diastolic size with normal wall thickness, low normal   wall motion, and ejection fraction 60%.  A. Essentially normal gadolinium viability without scar.  2. Small left renal cyst.   CT Coronary arteries 2016  1. Atherosclerotic heart disease.  a. Elevated calcium score of 347.4 (64th percentile for age and gender).  b. Nonobstructive three vessel coronary artery disease.  2. Descending thoracic aortic atherosclerosis, scattered.  3. Please see the separate radiology  dictation for noncardiovascular   findings.  Outside records reviewed from: Care Everywhere     ASSESSMENT and PLAN  Chester Palacios is a 71 year old male scheduled to undergo combined cystoscopy with TURP on 3/8/17 by Dr. Macias. He has the following specific operative considerations:   - RCRI : No serious cardiac risks.   - Anesthesia considerations: Refer to PAC assessment in anesthesia records  - Risk of PONV score = 1. If > 2, anti-emetic intervention recommended.     --Lower urinary tract symptoms. s/p TUNA 10 years ago. Above procedure now planned.  --HLD. Atorvastatin. Coronary calcifications on coronary CT, no obstructive disease. ASA 81 mg with planned hold for surgery. All testing above. Good activity tolerance. Swims regularly.  --Nonsmoker No pulmonary symptoms.  --Osteoarthritis. S/p right hip replacement. Pain in left hip and knee. Will require careful positioning.   --Anxiety. Will take Buspar on DOS. Doxepin at HS.  --Hearing loss with bilateral hearing aids.  Arrival time, NPO, shower and medication instructions provided by nursing staff today. Preparing For Your Surgery handout given.  Patient was discussed with Dr Wilson.     MIL Mane  Preoperative Assessment Center  [facility]  [facility]  Phone: [phone]  Fax: [phone]          Routing History       Date/Time From To Method     2/22/2017 12:40 PM Riya Ruelas APRN CNS Ordonez, Maria Alexandra, MD In Basket     2/22/2017 12:40 PM Riya Ruelas APRN CNS Ostlund, Mark L, MD In Basket                                     .

## 2017-02-22 NOTE — H&P
Pre-Operative H & P     CC:  Preoperative exam to assess for increased cardiopulmonary risk while undergoing surgery and anesthesia.    Date of Encounter: 2/22/2017  Primary Care Physician:  Dariel Jack  Chester Oliver Palacios is a 71 year old male who presents for pre-operative H & P in preparation for combined cystoscopy and TURP with Dr. Macias on 3/8/17 at [facility]. History is obtained from the patient.     Patient who presented to Dr. Macias with lower urinary tract symptoms. He is s/p transurethral needle ablation (TUNA) of prostate 10 years ago which helped to some degree. He was counseled for above procedure.   Past Medical History  Past Medical History   Diagnosis Date     Anxiety state 10/26/2012     Works with psychiatrist      Arthritis of right hip 12/21/2015     Coronary artery calcification seen on computed tomography 12/12/2016     See Cardiology consult 1/17; cardiac MRI shows EF 60%, no scar       Family history of hemochromatosis 10/25/2015     Brother; Chester had nml labs in 2010      Hyperlipidemia with target LDL less than 130 10/26/2012      Diagnosis updated by automated process. Provider to review and confirm.     Lower urinary tract symptoms (LUTS) 11/5/2016     See urology consult 10/16       Osteoarthritis, unspecified osteoarthritis type, unspecified site 11/7/2016       Past Surgical History  Past Surgical History   Procedure Laterality Date     Prostate surgery  2007     TUNA for BPH     Orthopedic surgery       R elbow Orif     Hernia repair  1985     Inguinal     Tonsillectomy       age 5     Joint replacement, hip rt/lt Right 1/16     Joint Replacement Hip RT/LT       Hx of Blood transfusions/reactions: Denies.     Hx of abnormal bleeding or anti-platelet use: ASA 81 mg with planned one week hold    Menstrual history: No LMP for male patient.    Steroid use in the last year: Denies.    Personal or FH with difficulty with  Anesthesia:  Denies.    Prior to Admission Medications  Current Outpatient Prescriptions   Medication Sig Dispense Refill     amoxicillin (AMOXIL) 500 MG tablet Take 500 mg by mouth See Admin Instructions Take 2000 mg 60 min prior to dental work       solifenacin (VESICARE) 5 MG tablet Take 1 tablet (5 mg) by mouth daily (Patient taking differently: Take 5 mg by mouth every morning ) 30 tablet 3     tamsulosin (FLOMAX) 0.4 MG capsule Take 1 capsule (0.4 mg) by mouth daily (Patient taking differently: Take 0.4 mg by mouth every morning ) 30 capsule 1     atorvastatin (LIPITOR) 40 MG tablet Take 1 tablet (40 mg) by mouth daily (Patient taking differently: Take 40 mg by mouth every morning ) 90 tablet 3     DOXEPIN HCL PO Take by mouth At Bedtime Patient states that he takes 1.25 mL HS, he is unsure of the concentration.       aspirin 81 MG tablet Take 81 mg by mouth every morning        diclofenac (VOLTAREN) 1 % GEL Place onto the skin as needed for moderate pain Patient uses prn on hands for playing [employer]. 100 g 3     busPIRone (BUSPAR) 10 MG tablet Take by mouth 3 times daily 20 mg QA, 10 mg every afternoon, 20 mg HS         Allergies  Allergies   Allergen Reactions     Animal Dander      cats       Social History  Social History     Social History     Marital status:      Spouse name: N/A     Number of children: N/A     Years of education: N/A     Occupational History      Self Employed.     Social History Main Topics     Smoking status: Never Smoker     Smokeless tobacco: Never Used     Alcohol use Yes      Comment: 2-3 drinks weekly     Drug use: No     Sexual activity: Yes     Partners: Female     Other Topics Concern     Parent/Sibling W/ Cabg, Mi Or Angioplasty Before 65f 55m? Yes     Social History Narrative    Retired 2013       Family History  Family History   Problem Relation Age of Onset     HEART DISEASE Father       age 56; had chest pain,  in the hospital soon after; was a smoker  "    Genetic Disorder Brother      +HFE gene mutation, but does not have hemochromatosis, but has phlebotomies     Blood Disease Mother      hemolytic anemia;  age 85       Review of Systems  The complete review of systems is negative other than noted in the HPI or here.   Constitutional: Denies fever, chills, weight loss.  HEENT: Wears glasses for vision. 1 week ago treated with 10 day course of antibiotics for strep throat. Symptoms resolved. [place] with bilateral hearing aids.  Respiratory: Denies cough or shortness of breath.  CV: Denies chest pain or irregular HR. Swims 1-2 times weekly.  GI: Denies abdominal pain or bowel issues.  : Denies dysuria.  M/S: s/p right hip replacement. Left hip and left knee pain.    Temp: 98  F (36.7  C) Temp src: Oral BP: 118/69 Pulse: 55   Resp: 16 SpO2: 96 %         166 lbs 1.6 oz  5' 10\"   Body mass index is 23.83 kg/(m^2).       Physical Exam  Constitutional: Awake, alert, cooperative, no apparent distress, and appears stated age. Thin.  Eyes: Pupils equal, round and reactive to light, extra ocular muscles intact, sclera clear, conjunctiva normal. Glasses on.  HENT: Normocephalic, oral pharynx with moist mucus membranes, good dentition. No goiter appreciated. Bilateral hearing aids.  Respiratory: Clear to auscultation bilaterally, no crackles or wheezing.  Cardiovascular: Regular rate and rhythm, normal S1 and S2, and no murmur noted. Carotids, no bruits. No edema. Palpable pulses to radial  DP and PT arteries.   GI: Normal bowel sounds, soft, non-distended, non-tender, no masses palpated, no hepatosplenomegaly.    Lymph/Hematologic: No cervical lymphadenopathy and no supraclavicular lymphadenopathy.  Genitourinary: Deferred.  Skin: Warm and dry.    Musculoskeletal: Mildly limited neck extension. There is no redness, warmth, or swelling of the joints. Gross motor strength is normal.    Neurologic: Awake, alert, oriented to name, place and time. Cranial nerves II-XII are " grossly intact. Gait is normal.   Neuropsychiatric: Calm, cooperative. Flat affect.     Labs: (personally reviewed) OSH 17   Cr 0.9  GFR >60  Hgb 10.1  Lab Results   Component Value Date    WBC 4.4 2016     Lab Results   Component Value Date    RBC 5.16 2016     Lab Results   Component Value Date    HGB 15.5 2016     Lab Results   Component Value Date    HCT 46.4 2016     Lab Results   Component Value Date    MCV 90 2016     Lab Results   Component Value Date    MCH 30.0 2016     Lab Results   Component Value Date    MCHC 33.4 2016     Lab Results   Component Value Date    RDW 12.4 2016     Lab Results   Component Value Date     2016       Last Basic Metabolic Panel:  Lab Results   Component Value Date     2016      Lab Results   Component Value Date    POTASSIUM 4.0 2016     Lab Results   Component Value Date    CHLORIDE 108 2016     Lab Results   Component Value Date    MARIANO 9.1 2016     Lab Results   Component Value Date    CO2 26 2016     Lab Results   Component Value Date    BUN 15 2016    BUN 22.2 10/10/2011     Lab Results   Component Value Date    CR 0.97 2016     Lab Results   Component Value Date     2016     Lab Results   Component Value Date    AST 19 2016     Lab Results   Component Value Date    ALT 25 2016     No results found for: BILICONJ   Lab Results   Component Value Date    BILITOTAL 0.6 2016     Lab Results   Component Value Date    ALBUMIN 3.8 2016     Lab Results   Component Value Date    PROTTOTAL 6.8 2016      Lab Results   Component Value Date    ALKPHOS 75 2016       UC pending per Dr. Macias  EKG: Personally reviewed but formal cardiology read pendin17 Sinus bradycardia, rate 47  Cardiac echo: 2015  Echo 2015   Final Impressions:   1. Normal LV size, mild LVH, low normal function with an estimated EF of 50 - 55%.   2.  Probable hypokinesis of the mid-apical lateral and inferolateral walls.   3. Normal RV size and function.   4. Normal valves.   5. No prior studies for comparison.  Cardiac MR 1/9/2017  1. Upper normal LV diastolic size with normal wall thickness, low normal   wall motion, and ejection fraction 60%.  A. Essentially normal gadolinium viability without scar.  2. Small left renal cyst.   CT Coronary arteries 12/2016  1. Atherosclerotic heart disease.  a.  Elevated calcium score of 347.4 (64th percentile for age and gender).  b.  Nonobstructive three vessel coronary artery disease.  2. Descending thoracic aortic atherosclerosis, scattered.  3. Please see the separate radiology dictation for noncardiovascular   findings.  Outside records reviewed from: Care Everywhere    ASSESSMENT and PLAN  Chester Palacios is a 71 year old male scheduled to undergo combined cystoscopy with TURP on 3/8/17 by Dr. Macias. He has the following specific operative considerations:   - RCRI : No serious cardiac risks.    - Anesthesia considerations:  Refer to PAC assessment in anesthesia records  - Risk of PONV score = 1.  If > 2, anti-emetic intervention recommended.     --Lower urinary tract symptoms. s/p TUNA 10 years ago. Above procedure now planned.   --HLD. Atorvastatin. Coronary calcifications on coronary CT, no obstructive disease. ASA 81 mg with planned hold for surgery. All testing above. Good activity tolerance. Swims regularly.   --Nonsmoker No pulmonary symptoms.   --Osteoarthritis. S/p right hip replacement. Pain in left hip and knee. Will require careful positioning.    --Anxiety. Will take Buspar on DOS. Doxepin at HS.   --Hearing loss with bilateral hearing aids.  Arrival time, NPO, shower and medication instructions provided by nursing staff today. Preparing For Your Surgery handout given.  Patient was discussed with Dr Wilson.    MIL Mane CNS  Preoperative Assessment Center  [facility]  [place]  [facility]  Phone: [phone]  Fax: [phone]

## 2017-02-22 NOTE — PATIENT INSTRUCTIONS
Preparing for Your Surgery    Name:  Chester Palacios   MRN:  [id number]   :  1945   Today's Date:  2017     Arriving for surgery:  Surgery date:  3/8/17  Surgery time:  1:50 PM  Arrival time:  11:50 AM    Please come to:     [facility], [address]  [address] [place]  [address]    -   parking is available in front of the hospital from 5:15 am to 8:00 pm    -  Stop at the Information Desk in the lobby    -   Inform the information person that you are here for surgery. An escort to 3c will be provided. If you would not like an escort, please proceed to 3C on the 3rd floor. [phone]     What can I eat or drink?  -  You may have solid food or milk products until 8 hours prior to your surgery.  -  You may have water, apple juice or 7up/Sprite until 2 hours prior to your surgery.    Which medicines can I take?   -  Please stop Aspirin 7 days before surgery.   -  Please take these medications the day of surgery:  All other regularly scheduled morning medications.    How do I prepare myself?  -  Take two showers: one the night before surgery; and one the morning of surgery.         Use Scrubcare or Hibiclens to wash from neck down.  You may use your own shampoo and conditioner. No other hair products.   -  Do NOT use lotion, powder, deodorant, or antiperspirant the day of your surgery.  -  Do NOT wear any makeup, fingernail polish or jewelry.  -  Do not bring your own medications to the hospital, except for inhalers and eye drops.  -  Bring your ID and insurance card.    Questions or Concerns:  If you have questions or concerns, please call the  Preoperative Assessment Center, Monday-Friday 7AM-7PM:  [phone]    AFTER YOUR SURGERY  Breathing exercises   Breathing exercises help you recover faster. Take deep breaths and let the air out slowly. This will:     Help you wake up after surgery.    Help prevent complications like pneumonia.  Preventing  complications will help you go home sooner.   We may give you a breathing device (incentive spirometer) to encourage you to breathe deeply.   Nausea and vomiting   You may feel sick to your stomach after surgery; if so, let your nurse know.    Pain control:  After surgery, you may have pain. Our goal is to help you manage your pain. Pain medicine will help you feel comfortable enough to do activities that will help you heal.  These activities may include breathing exercises, walking and physical therapy.   To help your health care team treat your pain we will ask: 1) If you have pain  2) where it is located 3) describe your pain in your words  Methods of pain control include medications given by mouth, vein or by nerve block for some surgeries.  We may give you a pain control pump that will:  1) Deliver the medicine through a tube placed in your vein  2) Control the amount of medicine you receive  3) Allow you to push a button to deliver a dose of pain medicine  Sequential Compression Device (SCD) or Pneumo Boots:  You may need to wear SCD S on your legs or feet. These are wraps connected to a machine that pumps in air and releases it. The repeated pumping helps prevent blood clots from forming.

## 2017-02-22 NOTE — MR AVS SNAPSHOT
After Visit Summary   2017    Chester Palacios    MRN: [id number]           Patient Information     Date Of Birth          1945        Visit Information        Provider Department      2017 9:30 AM Rn, [facility] Preoperative Assessment Center        Care Instructions    Preparing for Your Surgery    Name:  Chester Palacios   MRN:  [id number]   :  1945   Today's Date:  2017     Arriving for surgery:  Surgery date:  3/8/17  Surgery time:  1:50 PM  Arrival time:  11:50 AM    Please come to:     [facility], [address]  [address] [id number]    -   parking is available in front of the hospital from 5:15 am to 8:00 pm    -  Stop at the Information Desk in the lobby    -   Inform the information person that you are here for surgery. An escort to 3c will be provided. If you would not like an escort, please proceed to 3C on the 3rd floor. [phone]     What can I eat or drink?  -  You may have solid food or milk products until 8 hours prior to your surgery.  -  You may have water, apple juice or 7up/Sprite until 2 hours prior to your surgery.    Which medicines can I take?   -  Please stop Aspirin 7 days before surgery.   -  Please take these medications the day of surgery:  All other regularly scheduled morning medications.    How do I prepare myself?  -  Take two showers: one the night before surgery; and one the morning of surgery.         Use Scrubcare or Hibiclens to wash from neck down.  You may use your own shampoo and conditioner. No other hair products.   -  Do NOT use lotion, powder, deodorant, or antiperspirant the day of your surgery.  -  Do NOT wear any makeup, fingernail polish or jewelry.  -  Do not bring your own medications to the hospital, except for inhalers and eye drops.  -  Bring your ID and insurance card.    Questions or Concerns:  If you have questions or concerns, please call  the  Preoperative Assessment Center, Monday-Friday 7AM-7PM:  [phone]    AFTER YOUR SURGERY  Breathing exercises   Breathing exercises help you recover faster. Take deep breaths and let the air out slowly. This will:     Help you wake up after surgery.    Help prevent complications like pneumonia.  Preventing complications will help you go home sooner.   We may give you a breathing device (incentive spirometer) to encourage you to breathe deeply.   Nausea and vomiting   You may feel sick to your stomach after surgery; if so, let your nurse know.    Pain control:  After surgery, you may have pain. Our goal is to help you manage your pain. Pain medicine will help you feel comfortable enough to do activities that will help you heal.  These activities may include breathing exercises, walking and physical therapy.   To help your health care team treat your pain we will ask: 1) If you have pain  2) where it is located 3) describe your pain in your words  Methods of pain control include medications given by mouth, vein or by nerve block for some surgeries.  We may give you a pain control pump that will:  1) Deliver the medicine through a tube placed in your vein  2) Control the amount of medicine you receive  3) Allow you to push a button to deliver a dose of pain medicine  Sequential Compression Device (SCD) or Pneumo Boots:  You may need to wear SCD S on your legs or feet. These are wraps connected to a machine that pumps in air and releases it. The repeated pumping helps prevent blood clots from forming.         Follow-ups after your visit        Your next 10 appointments already scheduled     Feb 22, 2017 10:00 AM CST   (Arrive by 9:45 AM)   PAC EVALUATION with  Pac Cesilia [address] [place] [address] Preoperative Assessment Center ([facility])    [address] [place] [id number] [phone]            Feb 22, 2017 11:00 AM CST   (Arrive by 10:45 AM)   PAC Anesthesia Consult  with  Pac Anesthesiologist   [facility] Preoperative Assessment Center ([facility])    [address] [place] [address]  [address] [facility] [id number] [phone]            Feb 22, 2017 11:15 AM CST   LAB with  LAB   [facility] Lab ([facility])    [address] [place] [address]  [address]st [facility] [id number] [phone]           Patient must bring picture ID.  Patient should be prepared to give a urine specimen  Please do not eat 10-12 hours before your appointment if you are coming in fasting for labs on lipids, cholesterol, or glucose (sugar).  Pregnant women should follow their Care Team instructions. Water with medications is okay. Do not drink coffee or other fluids.   If you have concerns about taking  your medications, please ask at office or if scheduling via [facility], send a message by clicking on Secure Messaging, Message Your Care Team.            Mar 08, 2017   Procedure with Rosa Macias MD   [facility], [place], Same Day Surgery (--)    [address] [facility] [address] [phone]            Mar 17, 2017  4:30 PM CDT   (Arrive by 4:15 PM)   Post-Op with Rosa Macias MD   [facility] Prostate and Urologic Cancers ([facility])    [address] [place] [id number] [phone]            Apr 03, 2017  1:30 PM CDT   Office Visit with Dariel Jack MD   [facility] ([facility]    [address] [facility]  [address]  [facility] [id number] [phone]           Bring a current list of meds and any records pertaining to this visit.  For Physicals, please bring immunization records and any forms needing to be filled out.  Please arrive 10 minutes early to complete paperwork.              Who to contact     Please call your clinic at [phone] to:    Ask questions about your health    Make or  cancel appointments    Discuss your medicines    Learn about your test results    Speak to your doctor   If you have compliments or concerns about an experience at your clinic, or if you wish to file a complaint, please contact [facility] Patient Relations at [phone] or email us at [email or web address]         Additional Information About Your Visit        [employer] Information     [employer] gives you secure access to your electronic health record. If you see a primary care provider, you can also send messages to your care team and make appointments. If you have questions, please call your primary care clinic.  If you do not have a primary care provider, please call [phone] and they will assist you.      [employer] is an electronic gateway that provides easy, online access to your medical records. With [employer], you can request a clinic appointment, read your test results, renew a prescription or communicate with your care team.     To access your existing account, please contact your [facility] or call [phone] for assistance.        Care EveryWhere ID     This is your Care EveryWhere ID. This could be used by other organizations to access your [place] medical records  [id number]         Blood Pressure from Last 3 Encounters:   02/08/17 127/70   01/09/17 128/72   11/07/16 134/70    Weight from Last 3 Encounters:   02/08/17 72.6 kg (160 lb)   01/09/17 74.4 kg (164 lb)   11/07/16 74.4 kg (164 lb)              Today, you had the following     No orders found for display         Today's Medication Changes          These changes are accurate as of: 2/22/17  9:54 AM.  If you have any questions, ask your nurse or doctor.               These medicines have changed or have updated prescriptions.        Dose/Directions    atorvastatin 40 MG tablet   Commonly known as:  LIPITOR   This may have changed:  when to take this   Used for:  Hyperlipidemia  with target LDL less than 130        Dose:  40 mg   Take 1 tablet (40 mg) by mouth daily   Quantity:  90 tablet   Refills:  3       solifenacin 5 MG tablet   Commonly known as:  VESICARE   This may have changed:  when to take this   Used for:  Overactive bladder        Dose:  5 mg   Take 1 tablet (5 mg) by mouth daily   Quantity:  30 tablet   Refills:  3       tamsulosin 0.4 MG capsule   Commonly known as:  FLOMAX   This may have changed:  when to take this   Used for:  Overactive bladder        Dose:  0.4 mg   Take 1 capsule (0.4 mg) by mouth daily   Quantity:  30 capsule   Refills:  1                Primary Care Provider Office Phone # Fax #    Dariel Jack [phone]       [place] CARRIE ALCANTARA [address]  [facility][1] [phone]        Thank you!     Thank you for choosing [facility] PREOPERATIVE ASSESSMENT CENTER  for your care. Our goal is always to provide you with excellent care. Hearing back from our patients is one way we can continue to improve our services. Please take a few minutes to complete the written survey that you may receive in the mail after your visit with us. Thank you!             Your Updated Medication List - Protect others around you: Learn how to safely use, store and throw away your medicines at www.disposemymeds.org.          This list is accurate as of: 2/22/17  9:54 AM.  Always use your most recent med list.                   Brand Name Dispense Instructions for use    amoxicillin 500 MG tablet    AMOXIL     Take 500 mg by mouth See Admin Instructions Take 2000 mg 60 min prior to dental work       aspirin 81 MG tablet      Take 81 mg by mouth every morning       atorvastatin 40 MG tablet    LIPITOR    90 tablet    Take 1 tablet (40 mg) by mouth daily       busPIRone 10 MG tablet    BUSPAR     Take by mouth 3 times daily 20 mg QA, 10 mg every afternoon, 20 mg HS       diclofenac 1 % Gel topical gel    VOLTAREN    100 g    Place onto the skin as needed  for moderate pain Patient uses prn on hands for playing [employer].       DOXEPIN HCL PO      Take by mouth At Bedtime Patient states that he takes 1.25 mL HS, he is unsure of the concentration.       solifenacin 5 MG tablet    VESICARE    30 tablet    Take 1 tablet (5 mg) by mouth daily       tamsulosin 0.4 MG capsule    FLOMAX    30 capsule    Take 1 capsule (0.4 mg) by mouth daily

## 2017-02-23 LAB
BACTERIA SPEC CULT: NO GROWTH
INTERPRETATION ECG - MUSE: NORMAL
Lab: NORMAL
MICRO REPORT STATUS: NORMAL
SPECIMEN SOURCE: NORMAL

## 2017-03-03 ENCOUNTER — TELEPHONE (OUTPATIENT)
Dept: FAMILY MEDICINE | Facility: CLINIC | Age: 72
End: 2017-03-03

## 2017-03-03 NOTE — TELEPHONE ENCOUNTER
CHERYL  Had Pre op at [facility] 2/22/17.  Information on line.  Surgery is Wed. March 8 at the [place].wanted you to have this information.

## 2017-03-08 ENCOUNTER — ANESTHESIA (OUTPATIENT)
Dept: SURGERY | Facility: CLINIC | Age: 72
End: 2017-03-08
Payer: MEDICARE

## 2017-03-08 ENCOUNTER — HOSPITAL ENCOUNTER (OUTPATIENT)
Facility: CLINIC | Age: 72
Discharge: HOME | End: 2017-03-09
Attending: UROLOGY | Admitting: UROLOGY
Payer: MEDICARE

## 2017-03-08 ENCOUNTER — SURGERY (OUTPATIENT)
Age: 72
End: 2017-03-08

## 2017-03-08 DIAGNOSIS — N32.81 OVERACTIVE BLADDER: ICD-10-CM

## 2017-03-08 DIAGNOSIS — N40.1 BENIGN PROSTATIC HYPERPLASIA WITH LOWER URINARY TRACT SYMPTOMS, UNSPECIFIED MORPHOLOGY: Primary | ICD-10-CM

## 2017-03-08 PROBLEM — N40.0 BPH (BENIGN PROSTATIC HYPERPLASIA): Status: ACTIVE | Noted: 2017-03-08

## 2017-03-08 PROCEDURE — A9270 NON-COVERED ITEM OR SERVICE: HCPCS | Mod: GY | Performed by: UROLOGY

## 2017-03-08 PROCEDURE — 36000059 ZZH SURGERY LEVEL 3 EA 15 ADDTL MIN UMMC: Performed by: UROLOGY

## 2017-03-08 PROCEDURE — 25000128 H RX IP 250 OP 636: Performed by: ANESTHESIOLOGY

## 2017-03-08 PROCEDURE — 36000057 ZZH SURGERY LEVEL 3 1ST 30 MIN - UMMC: Performed by: UROLOGY

## 2017-03-08 PROCEDURE — 71000015 ZZH RECOVERY PHASE 1 LEVEL 2 EA ADDTL HR: Performed by: UROLOGY

## 2017-03-08 PROCEDURE — 25000128 H RX IP 250 OP 636: Performed by: NURSE ANESTHETIST, CERTIFIED REGISTERED

## 2017-03-08 PROCEDURE — 25000125 ZZHC RX 250: Performed by: ANESTHESIOLOGY

## 2017-03-08 PROCEDURE — 37000009 ZZH ANESTHESIA TECHNICAL FEE, EACH ADDTL 15 MIN: Performed by: UROLOGY

## 2017-03-08 PROCEDURE — 71000014 ZZH RECOVERY PHASE 1 LEVEL 2 FIRST HR: Performed by: UROLOGY

## 2017-03-08 PROCEDURE — 25000128 H RX IP 250 OP 636: Performed by: UROLOGY

## 2017-03-08 PROCEDURE — 25000566 ZZH SEVOFLURANE, EA 15 MIN: Performed by: UROLOGY

## 2017-03-08 PROCEDURE — 37000008 ZZH ANESTHESIA TECHNICAL FEE, 1ST 30 MIN: Performed by: UROLOGY

## 2017-03-08 PROCEDURE — 88305 TISSUE EXAM BY PATHOLOGIST: CPT | Performed by: UROLOGY

## 2017-03-08 PROCEDURE — 25800025 ZZH RX 258: Performed by: NURSE ANESTHETIST, CERTIFIED REGISTERED

## 2017-03-08 PROCEDURE — 25800025 ZZH RX 258: Performed by: UROLOGY

## 2017-03-08 PROCEDURE — 40000170 ZZH STATISTIC PRE-PROCEDURE ASSESSMENT II: Performed by: UROLOGY

## 2017-03-08 PROCEDURE — 27210794 ZZH OR GENERAL SUPPLY STERILE: Performed by: UROLOGY

## 2017-03-08 PROCEDURE — 25000125 ZZHC RX 250: Performed by: NURSE ANESTHETIST, CERTIFIED REGISTERED

## 2017-03-08 PROCEDURE — 25000132 ZZH RX MED GY IP 250 OP 250 PS 637: Mod: GY | Performed by: UROLOGY

## 2017-03-08 RX ORDER — CEFAZOLIN SODIUM 2 G/100ML
2 INJECTION, SOLUTION INTRAVENOUS
Status: COMPLETED | OUTPATIENT
Start: 2017-03-08 | End: 2017-03-08

## 2017-03-08 RX ORDER — ATORVASTATIN CALCIUM 40 MG/1
40 TABLET, FILM COATED ORAL DAILY
Status: DISCONTINUED | OUTPATIENT
Start: 2017-03-08 | End: 2017-03-09 | Stop reason: HOSPADM

## 2017-03-08 RX ORDER — NALOXONE HYDROCHLORIDE 0.4 MG/ML
.1-.4 INJECTION, SOLUTION INTRAMUSCULAR; INTRAVENOUS; SUBCUTANEOUS
Status: DISCONTINUED | OUTPATIENT
Start: 2017-03-08 | End: 2017-03-08 | Stop reason: HOSPADM

## 2017-03-08 RX ORDER — SODIUM CHLORIDE 9 MG/ML
INJECTION, SOLUTION INTRAVENOUS CONTINUOUS
Status: DISCONTINUED | OUTPATIENT
Start: 2017-03-08 | End: 2017-03-09 | Stop reason: HOSPADM

## 2017-03-08 RX ORDER — MEPERIDINE HYDROCHLORIDE 25 MG/ML
12.5 INJECTION INTRAMUSCULAR; INTRAVENOUS; SUBCUTANEOUS
Status: DISCONTINUED | OUTPATIENT
Start: 2017-03-08 | End: 2017-03-08 | Stop reason: HOSPADM

## 2017-03-08 RX ORDER — FENTANYL CITRATE 50 UG/ML
INJECTION, SOLUTION INTRAMUSCULAR; INTRAVENOUS PRN
Status: DISCONTINUED | OUTPATIENT
Start: 2017-03-08 | End: 2017-03-08

## 2017-03-08 RX ORDER — ONDANSETRON 4 MG/1
4 TABLET, ORALLY DISINTEGRATING ORAL EVERY 30 MIN PRN
Status: DISCONTINUED | OUTPATIENT
Start: 2017-03-08 | End: 2017-03-08

## 2017-03-08 RX ORDER — SODIUM CHLORIDE, SODIUM LACTATE, POTASSIUM CHLORIDE, CALCIUM CHLORIDE 600; 310; 30; 20 MG/100ML; MG/100ML; MG/100ML; MG/100ML
INJECTION, SOLUTION INTRAVENOUS CONTINUOUS
Status: DISCONTINUED | OUTPATIENT
Start: 2017-03-08 | End: 2017-03-08

## 2017-03-08 RX ORDER — NALOXONE HYDROCHLORIDE 0.4 MG/ML
.1-.4 INJECTION, SOLUTION INTRAMUSCULAR; INTRAVENOUS; SUBCUTANEOUS
Status: DISCONTINUED | OUTPATIENT
Start: 2017-03-08 | End: 2017-03-09 | Stop reason: HOSPADM

## 2017-03-08 RX ORDER — ONDANSETRON 2 MG/ML
4 INJECTION INTRAMUSCULAR; INTRAVENOUS EVERY 30 MIN PRN
Status: DISCONTINUED | OUTPATIENT
Start: 2017-03-08 | End: 2017-03-08

## 2017-03-08 RX ORDER — ONDANSETRON 2 MG/ML
INJECTION INTRAMUSCULAR; INTRAVENOUS PRN
Status: DISCONTINUED | OUTPATIENT
Start: 2017-03-08 | End: 2017-03-08

## 2017-03-08 RX ORDER — DOXEPIN HYDROCHLORIDE 10 MG/ML
10 SOLUTION ORAL AT BEDTIME
Status: DISCONTINUED | OUTPATIENT
Start: 2017-03-08 | End: 2017-03-09 | Stop reason: HOSPADM

## 2017-03-08 RX ORDER — ONDANSETRON 2 MG/ML
4 INJECTION INTRAMUSCULAR; INTRAVENOUS EVERY 30 MIN PRN
Status: DISCONTINUED | OUTPATIENT
Start: 2017-03-08 | End: 2017-03-08 | Stop reason: HOSPADM

## 2017-03-08 RX ORDER — ACETAMINOPHEN 325 MG/1
650 TABLET ORAL EVERY 6 HOURS PRN
Status: DISCONTINUED | OUTPATIENT
Start: 2017-03-08 | End: 2017-03-09 | Stop reason: HOSPADM

## 2017-03-08 RX ORDER — NALOXONE HYDROCHLORIDE 0.4 MG/ML
.1-.4 INJECTION, SOLUTION INTRAMUSCULAR; INTRAVENOUS; SUBCUTANEOUS
Status: DISCONTINUED | OUTPATIENT
Start: 2017-03-08 | End: 2017-03-08

## 2017-03-08 RX ORDER — SODIUM CHLORIDE, SODIUM LACTATE, POTASSIUM CHLORIDE, CALCIUM CHLORIDE 600; 310; 30; 20 MG/100ML; MG/100ML; MG/100ML; MG/100ML
INJECTION, SOLUTION INTRAVENOUS CONTINUOUS PRN
Status: DISCONTINUED | OUTPATIENT
Start: 2017-03-08 | End: 2017-03-08

## 2017-03-08 RX ORDER — ONDANSETRON 4 MG/1
4 TABLET, ORALLY DISINTEGRATING ORAL EVERY 30 MIN PRN
Status: DISCONTINUED | OUTPATIENT
Start: 2017-03-08 | End: 2017-03-08 | Stop reason: HOSPADM

## 2017-03-08 RX ORDER — ALUMINA, MAGNESIA, AND SIMETHICONE 2400; 2400; 240 MG/30ML; MG/30ML; MG/30ML
15-30 SUSPENSION ORAL EVERY 4 HOURS PRN
Status: DISCONTINUED | OUTPATIENT
Start: 2017-03-08 | End: 2017-03-09 | Stop reason: HOSPADM

## 2017-03-08 RX ORDER — SODIUM CHLORIDE, SODIUM LACTATE, POTASSIUM CHLORIDE, CALCIUM CHLORIDE 600; 310; 30; 20 MG/100ML; MG/100ML; MG/100ML; MG/100ML
INJECTION, SOLUTION INTRAVENOUS CONTINUOUS
Status: DISCONTINUED | OUTPATIENT
Start: 2017-03-08 | End: 2017-03-08 | Stop reason: HOSPADM

## 2017-03-08 RX ORDER — BUSPIRONE HYDROCHLORIDE 10 MG/1
20 TABLET ORAL 3 TIMES DAILY
Status: DISCONTINUED | OUTPATIENT
Start: 2017-03-08 | End: 2017-03-09 | Stop reason: HOSPADM

## 2017-03-08 RX ORDER — HYDROMORPHONE HYDROCHLORIDE 1 MG/ML
.3-.5 INJECTION, SOLUTION INTRAMUSCULAR; INTRAVENOUS; SUBCUTANEOUS
Status: DISCONTINUED | OUTPATIENT
Start: 2017-03-08 | End: 2017-03-09 | Stop reason: HOSPADM

## 2017-03-08 RX ORDER — HYDROMORPHONE HYDROCHLORIDE 1 MG/ML
.3-.5 INJECTION, SOLUTION INTRAMUSCULAR; INTRAVENOUS; SUBCUTANEOUS EVERY 10 MIN PRN
Status: DISCONTINUED | OUTPATIENT
Start: 2017-03-08 | End: 2017-03-08 | Stop reason: HOSPADM

## 2017-03-08 RX ORDER — LIDOCAINE HYDROCHLORIDE 20 MG/ML
INJECTION, SOLUTION INFILTRATION; PERINEURAL PRN
Status: DISCONTINUED | OUTPATIENT
Start: 2017-03-08 | End: 2017-03-08

## 2017-03-08 RX ORDER — HYDROMORPHONE HYDROCHLORIDE 1 MG/ML
.3-.5 INJECTION, SOLUTION INTRAMUSCULAR; INTRAVENOUS; SUBCUTANEOUS EVERY 10 MIN PRN
Status: DISCONTINUED | OUTPATIENT
Start: 2017-03-08 | End: 2017-03-08

## 2017-03-08 RX ORDER — EPHEDRINE SULFATE 50 MG/ML
INJECTION, SOLUTION INTRAMUSCULAR; INTRAVENOUS; SUBCUTANEOUS PRN
Status: DISCONTINUED | OUTPATIENT
Start: 2017-03-08 | End: 2017-03-08

## 2017-03-08 RX ORDER — FENTANYL CITRATE 50 UG/ML
25-50 INJECTION, SOLUTION INTRAMUSCULAR; INTRAVENOUS
Status: DISCONTINUED | OUTPATIENT
Start: 2017-03-08 | End: 2017-03-08 | Stop reason: HOSPADM

## 2017-03-08 RX ORDER — OXYCODONE HYDROCHLORIDE 5 MG/1
5-10 TABLET ORAL
Status: DISCONTINUED | OUTPATIENT
Start: 2017-03-08 | End: 2017-03-09 | Stop reason: HOSPADM

## 2017-03-08 RX ORDER — TAMSULOSIN HYDROCHLORIDE 0.4 MG/1
0.4 CAPSULE ORAL DAILY
Status: DISCONTINUED | OUTPATIENT
Start: 2017-03-08 | End: 2017-03-09 | Stop reason: HOSPADM

## 2017-03-08 RX ORDER — CEFAZOLIN SODIUM 1 G/3ML
1 INJECTION, POWDER, FOR SOLUTION INTRAMUSCULAR; INTRAVENOUS SEE ADMIN INSTRUCTIONS
Status: DISCONTINUED | OUTPATIENT
Start: 2017-03-08 | End: 2017-03-08 | Stop reason: HOSPADM

## 2017-03-08 RX ORDER — TOLTERODINE 2 MG/1
2 CAPSULE, EXTENDED RELEASE ORAL DAILY
Status: DISCONTINUED | OUTPATIENT
Start: 2017-03-08 | End: 2017-03-08

## 2017-03-08 RX ORDER — MEPERIDINE HYDROCHLORIDE 25 MG/ML
12.5 INJECTION INTRAMUSCULAR; INTRAVENOUS; SUBCUTANEOUS
Status: DISCONTINUED | OUTPATIENT
Start: 2017-03-08 | End: 2017-03-08

## 2017-03-08 RX ORDER — ONDANSETRON 4 MG/1
4 TABLET, ORALLY DISINTEGRATING ORAL EVERY 6 HOURS PRN
Status: DISCONTINUED | OUTPATIENT
Start: 2017-03-08 | End: 2017-03-09 | Stop reason: HOSPADM

## 2017-03-08 RX ORDER — OXYCODONE HYDROCHLORIDE 5 MG/1
5-10 TABLET ORAL
Qty: 30 TABLET | Refills: 0 | Status: ON HOLD | OUTPATIENT
Start: 2017-03-08 | End: 2017-03-14

## 2017-03-08 RX ORDER — PROPOFOL 10 MG/ML
INJECTION, EMULSION INTRAVENOUS PRN
Status: DISCONTINUED | OUTPATIENT
Start: 2017-03-08 | End: 2017-03-08

## 2017-03-08 RX ORDER — DEXAMETHASONE SODIUM PHOSPHATE 4 MG/ML
INJECTION, SOLUTION INTRA-ARTICULAR; INTRALESIONAL; INTRAMUSCULAR; INTRAVENOUS; SOFT TISSUE PRN
Status: DISCONTINUED | OUTPATIENT
Start: 2017-03-08 | End: 2017-03-08

## 2017-03-08 RX ORDER — AMOXICILLIN 500 MG/1
500 TABLET, FILM COATED ORAL SEE ADMIN INSTRUCTIONS
Status: DISCONTINUED | OUTPATIENT
Start: 2017-03-08 | End: 2017-03-08 | Stop reason: CLARIF

## 2017-03-08 RX ORDER — FENTANYL CITRATE 50 UG/ML
25-50 INJECTION, SOLUTION INTRAMUSCULAR; INTRAVENOUS
Status: DISCONTINUED | OUTPATIENT
Start: 2017-03-08 | End: 2017-03-08

## 2017-03-08 RX ORDER — PROCHLORPERAZINE MALEATE 5 MG
5 TABLET ORAL EVERY 6 HOURS PRN
Status: DISCONTINUED | OUTPATIENT
Start: 2017-03-08 | End: 2017-03-09 | Stop reason: HOSPADM

## 2017-03-08 RX ORDER — ONDANSETRON 2 MG/ML
4 INJECTION INTRAMUSCULAR; INTRAVENOUS EVERY 6 HOURS PRN
Status: DISCONTINUED | OUTPATIENT
Start: 2017-03-08 | End: 2017-03-09 | Stop reason: HOSPADM

## 2017-03-08 RX ADMIN — Medication 5 MG: at 12:42

## 2017-03-08 RX ADMIN — FENTANYL CITRATE 100 MCG: 50 INJECTION, SOLUTION INTRAMUSCULAR; INTRAVENOUS at 12:15

## 2017-03-08 RX ADMIN — FENTANYL CITRATE 50 MCG: 50 INJECTION, SOLUTION INTRAMUSCULAR; INTRAVENOUS at 12:56

## 2017-03-08 RX ADMIN — DEXAMETHASONE SODIUM PHOSPHATE 4 MG: 4 INJECTION, SOLUTION INTRAMUSCULAR; INTRAVENOUS at 12:30

## 2017-03-08 RX ADMIN — FENTANYL CITRATE 50 MCG: 50 INJECTION, SOLUTION INTRAMUSCULAR; INTRAVENOUS at 14:18

## 2017-03-08 RX ADMIN — LIDOCAINE HYDROCHLORIDE 100 MG: 20 INJECTION, SOLUTION INFILTRATION; PERINEURAL at 12:19

## 2017-03-08 RX ADMIN — HYDROMORPHONE HYDROCHLORIDE 0.3 MG: 10 INJECTION, SOLUTION INTRAMUSCULAR; INTRAVENOUS; SUBCUTANEOUS at 14:50

## 2017-03-08 RX ADMIN — ACETAMINOPHEN 650 MG: 325 TABLET, FILM COATED ORAL at 15:33

## 2017-03-08 RX ADMIN — PROPOFOL 150 MG: 10 INJECTION, EMULSION INTRAVENOUS at 12:19

## 2017-03-08 RX ADMIN — CEFAZOLIN SODIUM 2 G: 2 INJECTION, SOLUTION INTRAVENOUS at 12:27

## 2017-03-08 RX ADMIN — SODIUM CHLORIDE, POTASSIUM CHLORIDE, SODIUM LACTATE AND CALCIUM CHLORIDE: 600; 310; 30; 20 INJECTION, SOLUTION INTRAVENOUS at 12:12

## 2017-03-08 RX ADMIN — SODIUM CHLORIDE, POTASSIUM CHLORIDE, SODIUM LACTATE AND CALCIUM CHLORIDE: 600; 310; 30; 20 INJECTION, SOLUTION INTRAVENOUS at 13:29

## 2017-03-08 RX ADMIN — SODIUM CHLORIDE 3000 ML: 900 IRRIGANT IRRIGATION at 22:02

## 2017-03-08 RX ADMIN — Medication 5 MG: at 12:39

## 2017-03-08 RX ADMIN — OXYCODONE HYDROCHLORIDE 5 MG: 5 TABLET ORAL at 21:13

## 2017-03-08 RX ADMIN — FENTANYL CITRATE 50 MCG: 50 INJECTION, SOLUTION INTRAMUSCULAR; INTRAVENOUS at 14:36

## 2017-03-08 RX ADMIN — SODIUM CHLORIDE: 9 INJECTION, SOLUTION INTRAVENOUS at 14:45

## 2017-03-08 RX ADMIN — MIDAZOLAM HYDROCHLORIDE 2 MG: 1 INJECTION, SOLUTION INTRAMUSCULAR; INTRAVENOUS at 12:12

## 2017-03-08 RX ADMIN — DOXEPIN HYDROCHLORIDE 10 MG: 10 SOLUTION ORAL at 21:13

## 2017-03-08 RX ADMIN — ONDANSETRON 4 MG: 2 INJECTION INTRAMUSCULAR; INTRAVENOUS at 13:13

## 2017-03-08 RX ADMIN — SODIUM CHLORIDE 3000 ML: 900 IRRIGANT IRRIGATION at 20:38

## 2017-03-08 ASSESSMENT — PAIN DESCRIPTION - DESCRIPTORS: DESCRIPTORS: ACHING

## 2017-03-08 NOTE — OP NOTE
PREOPERATIVE DIAGNOSIS: Prostatic hypertrophy with urinary retention.   POSTOPERATIVE DIAGNOSIS: Prostatic hypertrophy with urinary retention.   PROCEDURE PERFORMED: Transurethral resection of prostate.   ATTENDING SURGEON: Rosa Macias MD   RESIDENT SURGEON: Leatha Moeller MD   FINDINGS:  prostate with lateral lobe hypertrophy.   ANESTHESIA: General.   INTRAVENOUS FLUIDS: see anesthesia record   ESTIMATED BLOOD LOSS: 10 mL.   SPECIMENS: Prostate chips.   DRAINS: A  24-Bangladeshi 3-way catheter.   INDICATIONS FOR PROCEDURE: Chester Palacios is a 71 year old male who was seen in consultation for BPH with obstructive voiding symptoms. He had failed optimal medical therapy with tamsulosin and vesicare.   His digital rectal exam was negative for any nodules.  After discussion of the risks, benefits and alternatives of the procedure, the patient agreed to proceed with transurethral resection of his prostate.   DESCRIPTION OF PROCEDURE: After verifying informed consent, the patient was taken to the operating room. Adequate anesthesia was induced, he was placed in dorsal lithotomy position and he was prepped and draped in standard sterile fashion. A timeout was performed to verify correct patient and procedure. Pneumo boots and perioperative antibiotics were in place before the procedure commenced. We began the procedure by inserting a 22-Bangladeshi rigid cystoscope. The anterior urethra was unremarkable.  There was lateral lobe hypertrophy. Upon gaining entrance into the bladder, complete survey was performed. This was significant for grade 1 trabeculation.  We were able to see both ureteral orifices. There was no large median lobe.     We then introduced the 26-Bangladeshi bipolar resectoscope. We started by resecting the left lateral lobe of the prostate. We resected tissue from the right and left lobe out to the capsule, from the neck of the bladder down to the verumontanum from the 6 o'clock position to 12  o'clock position. Once we had completed our dissection, we used the Ellik evacuator to remove the prostate chips. We then reintroduced the resectoscope to ensure meticulous hemostasis of the prostatic fossa.     A 24-Macedonian 3-way catheter was placed, and 30mL was instilled into the balloon. Catheter was hooked up to continuous irrigation which was clear;  the patient was awakened from anesthesia. He was then transferred to the recovery room in stable condition.   POSTOPERATIVE PLAN: Will plan to observe him overnight on continuous bladder irrigation.

## 2017-03-08 NOTE — IP AVS SNAPSHOT
"                  MRN:[id number]                      After Visit Summary   3/8/2017    Chester Palacios    MRN: [id number]           Thank you!     Thank you for choosing [facility] for your care. Our goal is always to provide you with excellent care. Hearing back from our patients is one way we can continue to improve our services. Please take a few minutes to complete the written survey that you may receive in the mail after you visit with us. Thank you!        Patient Information     Date Of Birth          1945        About your hospital stay     You were admitted on:  March 8, 2017 You last received care in the:  Unit 6D Observation [facility]    You were discharged on:  March 9, 2017       Who to Call     For medical emergencies, please call 911.  For non-urgent questions about your medical care, please call your primary care provider or clinic, [phone]  For questions related to your surgery, please call your surgery clinic        Attending Provider     Provider Rosa Rueda MD Urology       Primary Care Provider Office Phone # Fax #    Dariel Jack [phone]       [facility] [address] [facility] [address]        After Care Instructions     Activity Restriction       Activity  - No strenuous exercise for 6 weeks.  - No lifting, pushing, pulling more than 10 pounds for 6 weeks.   - Do not strain with bowel movements.  - Do not drive until you can press the brake pedal quickly and fully without pain.   - Do not operate a motor vehicle while taking narcotic pain medications.            Contact Information       Phone numbers:   - Monday through Friday 8am to 4:30pm: Call [phone] with questions or to schedule or confirm appointment.    - Nights or weekends: call the after hours emergency pager - [phone] and tell the  \"I would like to page the Urology Resident on call.\"  - For emergencies, call [phone]         "    Diet Instructions       Resume pre-procedure diet            Follow up       Follow-Up:  - Follow up with your surgeon as previously scheduled  - Call your primary care provider to touch base regarding your recent admission.   - Call or return sooner than your regularly scheduled visit if you develop any of the following: fever (greater than 101.5), uncontrolled pain, uncontrolled nausea or vomiting, as well as increased redness, swelling, or drainage from your wound.            Medications       Medications  - Transition from narcotic pain medications to tylenol (acetaminophen) as you are able.  Wean yourself off all pain medications as you are able.  - Some pain medications contain both tylenol (acetaminophen) and a narcotic (Norco, vicodin, percocet), do not take more than 4,000mg of Tylenol (acetaminophen) from all sources in any 24 hour period.  - Narcotics can make you constipated.  Take over the counter fiber (metamucil or benefiber) and stool softeners (miralax, docusate or senna) while taking narcotic pain medications, but stop if you develop diarrhea.  - No driving or operating machinery while taking narcotic pain medications            No Alcohol       For 24 hours post procedure            Urination       Urination  - Catheter will be removed prior to discharge. Some patient will need to have catheter replaced before leaving the hospital and will go home with the catheter for a few days, this does not mean the operation has failed but the prostate may need more time to recover  - Some light redness (up to a watermelon-like-pink in color) is expected in the upcoming 7-10 day and may last as long as 4 weeks.  You can also expect some debris.  - If having blood in the urine, make sure to increase your water intake and monitor for improvement  - If you are unable to urinate you should return urgently to the ED or call clinic to try to arrange for an urgent visit same day.   - If you are going home with a  "catheter: Nurse/ to write care and instructions.  Treat the catheter like an extension of your body; do not let it get caught or snagged on anything.  Leave the catheter in place until your follow up. Call the numbers listed above for any concerns.                  Your next 10 appointments already scheduled     Mar 17, 2017  4:30 PM CDT   (Arrive by 4:15 PM)   Post-Op with Rosa Macias MD   [facility] ([facility])    [address]  [address]th [address]  [place] [facility] [place] [address] [phone]            Apr 03, 2017  1:30 PM CDT   Office Visit with Dariel Jack MD   [facility] ([facility])    [address]  [address]  [place] [address] [phone]           Bring a current list of meds and any records pertaining to this visit.  For Physicals, please bring immunization records and any forms needing to be filled out.  Please arrive 10 minutes early to complete paperwork.              Pending Results     Date and Time Order Name Status Description    3/9/2017 0100 Basic metabolic panel In process     3/8/2017 1317 Surgical pathology exam In process             Statement of Approval     Ordered          03/09/17 0807  I have reviewed and agree with all the recommendations and orders detailed in this document.  EFFECTIVE NOW     Approved and electronically signed by:  Leatha Moeller MD             Admission Information     Date & Time Provider Department Dept. Phone    3/8/2017 Rosa Macias MD Unit 6D Observation [facility] [phone]      Your Vitals Were     Blood Pressure Temperature Respirations Height Weight Pulse Oximetry    117/62 (BP Location: Left arm) 98.3  F (36.8  C) (Oral) 16 1.778 m (5' 10\") 74.1 kg (163 lb 5.8 oz) 94%    BMI (Body Mass Index)                   23.44 kg/m2           MyChart Information  "    [facility]mag gives you secure access to your electronic health record. If you see a primary care provider, you can also send messages to your care team and make appointments. If you have questions, please call your primary care clinic.  If you do not have a primary care provider, please call [phone] and they will assist you.        Care EveryWhere ID     This is your Care EveryWhere ID. This could be used by other organizations to access your [facility] medical records  [id number]           Review of your medicines      START taking        Dose / Directions    docusate sodium 100 MG tablet   Commonly known as:  COLACE        Dose:  100 mg   Take 100 mg by mouth 2 times daily   Quantity:  60 tablet   Refills:  1       oxyCODONE 5 MG IR tablet   Commonly known as:  ROXICODONE        Dose:  5-10 mg   Take 1-2 tablets (5-10 mg) by mouth every 3 hours as needed for pain or other (Moderate to Severe)   Quantity:  30 tablet   Refills:  0         CONTINUE these medicines which may have CHANGED, or have new prescriptions. If we are uncertain of the size of tablets/capsules you have at home, strength may be listed as something that might have changed.        Dose / Directions    atorvastatin 40 MG tablet   Commonly known as:  LIPITOR   This may have changed:  when to take this   Used for:  Hyperlipidemia with target LDL less than 130        Dose:  40 mg   Take 1 tablet (40 mg) by mouth daily   Quantity:  90 tablet   Refills:  3       tamsulosin 0.4 MG capsule   Commonly known as:  FLOMAX   This may have changed:  when to take this   Used for:  Overactive bladder        Dose:  0.4 mg   Take 1 capsule (0.4 mg) by mouth daily   Quantity:  30 capsule   Refills:  1         CONTINUE these medicines which have NOT CHANGED        Dose / Directions    amoxicillin 500 MG tablet   Commonly known as:  AMOXIL        Dose:  500 mg   Take 500 mg by mouth See Admin Instructions Take 2000 mg 60 min prior to dental work   Refills:  0        aspirin 81 MG tablet        Dose:  81 mg   Take 81 mg by mouth every morning   Refills:  0       busPIRone 10 MG tablet   Commonly known as:  BUSPAR        Take by mouth 3 times daily 20 mg QA, 10 mg every afternoon, 20 mg HS   Refills:  0       diclofenac 1 % Gel topical gel   Commonly known as:  VOLTAREN   Used for:  Arthritis of both hands        Place onto the skin as needed for moderate pain Patient uses prn on hands for playing gu[employer]uroar.   Quantity:  100 g   Refills:  3       DOXEPIN HCL PO        Take by mouth At Bedtime Patient states that he takes 1.25 mL HS, he is unsure of the concentration.   Refills:  0         STOP taking     solifenacin 5 MG tablet   Commonly known as:  VESICARE                Where to get your medicines      These medications were sent to [facility] [place], [place] - [address]  [address] [address]     Phone:  [phone]     docusate sodium 100 MG tablet         Some of these will need a paper prescription and others can be bought over the counter. Ask your nurse if you have questions.     Bring a paper prescription for each of these medications     oxyCODONE 5 MG IR tablet                Protect others around you: Learn how to safely use, store and throw away your medicines at www.disposemymeds.org.             Medication List: This is a list of all your medications and when to take them. Check marks below indicate your daily home schedule. Keep this list as a reference.      Medications           Morning Afternoon Evening Bedtime As Needed    amoxicillin 500 MG tablet   Commonly known as:  AMOXIL   Take 500 mg by mouth See Admin Instructions Take 2000 mg 60 min prior to dental work                                aspirin 81 MG tablet   Take 81 mg by mouth every morning                                atorvastatin 40 MG tablet   Commonly known as:  LIPITOR   Take 1 tablet (40 mg) by mouth daily                                busPIRone  10 MG tablet   Commonly known as:  BUSPAR   Take by mouth 3 times daily 20 mg QA, 10 mg every afternoon, 20 mg HS                                diclofenac 1 % Gel topical gel   Commonly known as:  VOLTAREN   Place onto the skin as needed for moderate pain Patient uses prn on hands for playing [employer].                                docusate sodium 100 MG tablet   Commonly known as:  COLACE   Take 100 mg by mouth 2 times daily                                DOXEPIN HCL PO   Take by mouth At Bedtime Patient states that he takes 1.25 mL HS, he is unsure of the concentration.   Last time this was given:  10 mg on 3/8/2017  9:13 PM                                oxyCODONE 5 MG IR tablet   Commonly known as:  ROXICODONE   Take 1-2 tablets (5-10 mg) by mouth every 3 hours as needed for pain or other (Moderate to Severe)   Last time this was given:  5 mg on 3/8/2017  9:13 PM                                tamsulosin 0.4 MG capsule   Commonly known as:  FLOMAX   Take 1 capsule (0.4 mg) by mouth daily

## 2017-03-08 NOTE — IP AVS SNAPSHOT
Unit 6D Observation [address] [address]    Phone:  [phone]    Fax:  [phone]                                       After Visit Summary   3/8/2017    Che[facility] Palacios    MRN: [id number]           After Visit Summary Signature Page     I have received my discharge instructions, and my questions have been answered. I have discussed any challenges I see with this plan with the nurse or doctor.    ..........................................................................................................................................  Patient/Patient Representative Signature      ..........................................................................................................................................  Patient Representative Print Name and Relationship to Patient    ..................................................               ................................................  Date                                            Time    ..........................................................................................................................................  Reviewed by Signature/Title    ...................................................              ..............................................  Date                                                            Time

## 2017-03-08 NOTE — ANESTHESIA POSTPROCEDURE EVALUATION
Patient: Chester Palacios    Procedure(s):  Cystoscopy, Transurethral Resection Of The Prostate - Wound Class: II-Clean Contaminated    Diagnosis:Urinary Frequency   Diagnosis Additional Information: No value filed.    Anesthesia Type:  General, ETT, LMA    Note:  Anesthesia Post Evaluation    Patient location during evaluation: PACU  Patient participation: Able to fully participate in evaluation  Level of consciousness: awake and alert  Pain management: adequate  Airway patency: patent  Cardiovascular status: acceptable  Respiratory status: acceptable  Hydration status: acceptable  PONV: none     Anesthetic complications: None          Last vitals:  Vitals:    03/08/17 1106 03/08/17 1338 03/08/17 1345   BP: 141/81 (!) 157/135 150/86   Resp: 16 12 12   Temp: 36.9  C (98.4  F)     SpO2: 98% 99% 100%         Electronically Signed By: Sandeep Unger MD  March 8, 2017  2:09 PM

## 2017-03-08 NOTE — ANESTHESIA CARE TRANSFER NOTE
Patient: Chester Palacios    Procedure(s):  Cystoscopy, Transurethral Resection Of The Prostate - Wound Class: II-Clean Contaminated    Diagnosis: Urinary Frequency   Diagnosis Additional Information: No value filed.    Anesthesia Type:   General, ETT, LMA     Note:  Airway :Face Mask  Patient transferred to:PACU  Comments: VSS. Report to RN. Sp02 100%.      Vitals: (Last set prior to Anesthesia Care Transfer)    CRNA VITALS  3/8/2017 1303 - 3/8/2017 1341      3/8/2017             Pulse: 58    SpO2: 100 %    Resp Rate (set): 10                Electronically Signed By: MIL Solano CRNA  March 8, 2017  1:41 PM

## 2017-03-09 VITALS
HEIGHT: 70 IN | DIASTOLIC BLOOD PRESSURE: 62 MMHG | WEIGHT: 163.36 LBS | SYSTOLIC BLOOD PRESSURE: 117 MMHG | BODY MASS INDEX: 23.39 KG/M2 | OXYGEN SATURATION: 94 % | RESPIRATION RATE: 16 BRPM | TEMPERATURE: 98.3 F

## 2017-03-09 LAB
ANION GAP SERPL CALCULATED.3IONS-SCNC: 7 MMOL/L (ref 3–14)
BUN SERPL-MCNC: 14 MG/DL (ref 7–30)
CALCIUM SERPL-MCNC: 8.6 MG/DL (ref 8.5–10.1)
CHLORIDE SERPL-SCNC: 106 MMOL/L (ref 94–109)
CO2 SERPL-SCNC: 25 MMOL/L (ref 20–32)
CREAT SERPL-MCNC: 0.92 MG/DL (ref 0.66–1.25)
ERYTHROCYTE [DISTWIDTH] IN BLOOD BY AUTOMATED COUNT: 13.1 % (ref 10–15)
GFR SERPL CREATININE-BSD FRML MDRD: 80 ML/MIN/1.7M2
GLUCOSE SERPL-MCNC: 108 MG/DL (ref 70–99)
HCT VFR BLD AUTO: 41.2 % (ref 40–53)
HGB BLD-MCNC: 13.9 G/DL (ref 13.3–17.7)
MCH RBC QN AUTO: 30.3 PG (ref 26.5–33)
MCHC RBC AUTO-ENTMCNC: 33.7 G/DL (ref 31.5–36.5)
MCV RBC AUTO: 90 FL (ref 78–100)
PLATELET # BLD AUTO: 147 10E9/L (ref 150–450)
POTASSIUM SERPL-SCNC: 3.9 MMOL/L (ref 3.4–5.3)
RBC # BLD AUTO: 4.59 10E12/L (ref 4.4–5.9)
SODIUM SERPL-SCNC: 138 MMOL/L (ref 133–144)
WBC # BLD AUTO: 8 10E9/L (ref 4–11)

## 2017-03-09 PROCEDURE — 85027 COMPLETE CBC AUTOMATED: CPT | Performed by: UROLOGY

## 2017-03-09 PROCEDURE — 36415 COLL VENOUS BLD VENIPUNCTURE: CPT | Performed by: UROLOGY

## 2017-03-09 PROCEDURE — 80048 BASIC METABOLIC PNL TOTAL CA: CPT | Performed by: UROLOGY

## 2017-03-09 PROCEDURE — A9270 NON-COVERED ITEM OR SERVICE: HCPCS | Mod: GY | Performed by: UROLOGY

## 2017-03-09 PROCEDURE — 25000132 ZZH RX MED GY IP 250 OP 250 PS 637: Mod: GY | Performed by: UROLOGY

## 2017-03-09 RX ORDER — ASPIRIN 81 MG
100 TABLET, DELAYED RELEASE (ENTERIC COATED) ORAL 2 TIMES DAILY
Qty: 60 TABLET | Refills: 1 | Status: SHIPPED
Start: 2017-03-09 | End: 2017-03-17

## 2017-03-09 RX ADMIN — SODIUM CHLORIDE 3000 ML: 900 IRRIGANT IRRIGATION at 01:45

## 2017-03-09 RX ADMIN — ACETAMINOPHEN 650 MG: 325 TABLET, FILM COATED ORAL at 03:08

## 2017-03-09 NOTE — PROGRESS NOTES
Urology Daily Progress Note    24 hour events/Subjective:     - No acute events overnight   - Pain well controlled on current regimen   - Tolerating clear liquid diet ; no nausea or vomiting     O:  Vitals: Afebrile, VSS  General: Alert, interactive, in NAD  Resp: Non-labored breathing on RA  Abdomen: Soft, non-tender, non distended.  Ext: Warm and well perfused  [place]/Urostomy:  3 way catheter in place - Light pink/watermelon output; Now clamped    Labs/Imaging  Heme:No lab results found in last 7 days.  Chem:No lab results found in last 7 days.    Assessment/Plan  71 year old y/o male POD#1  s/p TURP.      NEURO Pain well controlled on current regimen.  Changes:  None    CV HDS.    PULM Aggressive pulmonary toilet and I/S.   FEN/GI D/c IVF. Continue regular    Catheter clamped; Plan for TOV today after patient has ambulated   HEME Hgb as above.  Continue to monitor. No transfusions indicated at this time   ID Afebrile, no leukocytosis.    Antibiotics: None    ENDO No issues   ACTIVITY Up as tolerated  Ambulate at least TID    PPx SCDs, ambulation   DISPO Home,          Seen and examined with chief resident, to be discussed with Dr. Jefferson    --  LORENA Lei MD  Urology Resident           Contacting the Urology Team     Please use the following job codes to reach the Urology Team. Note that you must use an in house phone and that job codes cannot receive text pages.     On weekdays, dial [phone] (or star-star-star 777 on the new [place] telephones) then 0817 to reach the Adult Urology resident or PA on call    On weekdays, dial [phone] (or star-star-star 777 on the new [place] telephones) then 0818 to reach the Pediatric Urology resident    On weeknights and weekends, dial [phone] (or star-star-star 777 on the new [place] telephones) then [phone] to reach the Urology resident on call (for both Adult and Pediatrics)            Addendum: TOV performed and bladder scan 80 mL. No clots--remained watermelon colored. Reinforced activity  restrictions. Patient to complete Flomax he has at home and then stop this medication/no refills needed. Will d/c additional Vesicare as well given potential risk for retention. Appointment already scheduled for clinic f/u with Dr. Macias.

## 2017-03-09 NOTE — PROGRESS NOTES
Discharge instructions reviewed with patient, questions answered. PIV removed. Patient walked off unit independently to  discharge medications at discharge pharmacy.

## 2017-03-09 NOTE — DISCHARGE SUMMARY
[facility] Discharge Summary    Patient:   Chester Palacios  MRN:   [id number]  :   1945    Date of Admission: 3/8/2017  Date of Discharge:      3/9/2017  Admitting Physician: Rosa Macias MD  Discharge Physician: Leatha Moeller MD          Admission Diagnoses:   Lower urinary tract symptoms          Discharge Diagnosis:   Same  Final path pending at time of discharge          Procedures:   3/8/2017: Cystourethroscopy, bipolar transurethral resection of the prostate         Consultations:   None         Brief History of Illness:   Chester Palacios is a 71 year old male who is status-post TUNA approximately 10 years ago who was seen in consultation for  obstructive voiding symptoms. He had failed optimal medical therapy with tamsulosin and Vesicare. His digital rectal exam was negative for any nodules. After a thorough discussion of the risks, benefits and alternatives of the procedure, the patient agreed to proceed with transurethral resection of his prostate. He was counseled on potential risks, including retention, incontinence, damage to surrounding structures, bleeding, infection, and risks of anesthesia.          Hospital Course:   The patient was admitted to the hospital and underwent the above named procedures.  For specific details, please refer to the dictated operative report in the patient's chart.  In summary, he tolerated the procedure well and there were no intraoperative complications.  Following the procedure he was admitted to the floor for routine post operative care on continuous bladder irrigation.      The patient's overall hospital course was uneventful, and he remained hemodynamically stable and afebrile for the hospital stay. His bladder irrigation was clamped in the morning on post-op day 1, and urine remained clear off of CBI and with ambulation. His Santana catheter was removed/trial of void performed, and his post-void residual was 80 mL. Diet was advanced  to a regular diet, which was tolerated well before discharge. Labs also remained stable.  At this time the patient was determined stable for discharge from the hospital.           Discharge Medications:     Current Discharge Medication List      START taking these medications    Details   docusate sodium (COLACE) 100 MG tablet Take 100 mg by mouth 2 times daily  Qty: 60 tablet, Refills: 1    Associated Diagnoses: Benign prostatic hyperplasia with lower urinary tract symptoms, unspecified morphology      oxyCODONE (ROXICODONE) 5 MG IR tablet Take 1-2 tablets (5-10 mg) by mouth every 3 hours as needed for pain or other (Moderate to Severe)  Qty: 30 tablet, Refills: 0    Associated Diagnoses: Benign prostatic hyperplasia with lower urinary tract symptoms, unspecified morphology         CONTINUE these medications which have NOT CHANGED    Details   amoxicillin (AMOXIL) 500 MG tablet Take 500 mg by mouth See Admin Instructions Take 2000 mg 60 min prior to dental work      tamsulosin (FLOMAX) 0.4 MG capsule Take 1 capsule (0.4 mg) by mouth daily  Qty: 30 capsule, Refills: 1    Associated Diagnoses: Overactive bladder      atorvastatin (LIPITOR) 40 MG tablet Take 1 tablet (40 mg) by mouth daily  Qty: 90 tablet, Refills: 3    Associated Diagnoses: Hyperlipidemia with target LDL less than 130      DOXEPIN HCL PO Take by mouth At Bedtime Patient states that he takes 1.25 mL HS, he is unsure of the concentration.      aspirin 81 MG tablet Take 81 mg by mouth every morning       diclofenac (VOLTAREN) 1 % GEL Place onto the skin as needed for moderate pain Patient uses prn on hands for playing guitar.  Qty: 100 g, Refills: 3    Associated Diagnoses: Arthritis of both hands      busPIRone (BUSPAR) 10 MG tablet Take by mouth 3 times daily 20 mg QA, 10 mg every afternoon, 20 mg HS         STOP taking these medications       solifenacin (VESICARE) 5 MG tablet Comments:   Reason for Stopping:                       Discharge  Instructions:   Activity  - No strenuous exercise for 6 weeks.  - No lifting, pushing, pulling more than 10 pounds for 6 weeks.   - Do not strain with bowel movements.  - Do not drive until you can press the brake pedal quickly and fully without pain.   - Do not operate a motor vehicle while taking narcotic pain medications.     Medications  - Continue the remaining Flomax you have at home, but you do not need a refill or further use of this medication after your existing supply is completed.   - Do not take any additional Tylenol (acetaminophen) while using Percocet or Vicodin.  - Do not take more than 4,000mg of Tylenol (acetaminophen) in any 24 hour period, as this can cause liver damage.  - Take stool softeners such as Senna while you are using narcotics, but stop if you develop diarrhea.   - Wean yourself off of narcotic pain medications.     Follow-Up:  - Follow up with Dr. Macias for pathology review and symptom review in urology clinic as scheduled on 3/17/2017.   - Call your primary care provider to touch base regarding your recent admission.   - Call or return sooner than your regularly scheduled visit if you develop any of the following:  Fever >101 F, uncontrolled pain, uncontrolled nausea or vomiting, as well as inability to urinate and/or passage of large, fingertip-sized blood clots in the urine. You can expect to see blood in the urine after this type of surgery, so drink plenty of water to keep urine as clear as possible.  You may call the Urology Clinic during daytime hours at [phone].  If after hours, call [phone] and ask to speak with the Urology resident on call.            Discharge Follow-Up:   Please see above         Discharge Disposition:   Discharged to home

## 2017-03-09 NOTE — PROGRESS NOTES
Pt a&o x4, denies pain or nausea. CBI infusing to give light pink output. VSS. Able to tolerate regular diet. Wife at bedside. Will continue to monitor.

## 2017-03-09 NOTE — PLAN OF CARE
"Problem: Goal Outcome Summary  Goal: Goal Outcome Summary  Outcome: Improving  /63  Temp 97.9  F (36.6  C) (Oral)  Resp 16  Ht 1.778 m (5' 10\")  Wt 74.1 kg (163 lb 5.8 oz)  SpO2 95%  BMI 23.44 kg/m2     8910-1924: A/O x4, forgetful at times.  Bed alarm in place.  C/O penile pain x1, pain controlled with PO oxycodone and tylenol.  CBI continues, urine watermelon colored.  Up SBA, walked in halls x1. Tolerating PO intake on regular diet.  Loss of IV access.  Refused various PM medications.  Will continue to monitor and update MD as needed.        "

## 2017-03-11 ENCOUNTER — HOSPITAL ENCOUNTER (EMERGENCY)
Facility: CLINIC | Age: 72
Discharge: HOME | DRG: 872 | End: 2017-03-11
Attending: EMERGENCY MEDICINE | Admitting: EMERGENCY MEDICINE
Payer: MEDICARE

## 2017-03-11 ENCOUNTER — TELEPHONE (OUTPATIENT)
Dept: UROLOGY | Facility: CLINIC | Age: 72
End: 2017-03-11

## 2017-03-11 VITALS
RESPIRATION RATE: 18 BRPM | BODY MASS INDEX: 22.9 KG/M2 | WEIGHT: 160 LBS | SYSTOLIC BLOOD PRESSURE: 120 MMHG | HEART RATE: 74 BPM | OXYGEN SATURATION: 94 % | HEIGHT: 70 IN | TEMPERATURE: 101.4 F | DIASTOLIC BLOOD PRESSURE: 66 MMHG

## 2017-03-11 DIAGNOSIS — Z98.890 PERSONAL HISTORY OF SURGERY TO HEART AND GREAT VESSELS, PRESENTING HAZARDS TO HEALTH: ICD-10-CM

## 2017-03-11 DIAGNOSIS — N30.01 ACUTE CYSTITIS WITH HEMATURIA: ICD-10-CM

## 2017-03-11 LAB
ALBUMIN SERPL-MCNC: 3.4 G/DL (ref 3.4–5)
ALBUMIN UR-MCNC: 100 MG/DL
ALP SERPL-CCNC: 114 U/L (ref 40–150)
ALT SERPL W P-5'-P-CCNC: 21 U/L (ref 0–70)
ANION GAP SERPL CALCULATED.3IONS-SCNC: 11 MMOL/L (ref 3–14)
APPEARANCE UR: ABNORMAL
AST SERPL W P-5'-P-CCNC: 13 U/L (ref 0–45)
BASOPHILS # BLD AUTO: 0 10E9/L (ref 0–0.2)
BASOPHILS NFR BLD AUTO: 0.3 %
BILIRUB SERPL-MCNC: 0.7 MG/DL (ref 0.2–1.3)
BILIRUB UR QL STRIP: NEGATIVE
BUN SERPL-MCNC: 20 MG/DL (ref 7–30)
CALCIUM SERPL-MCNC: 9 MG/DL (ref 8.5–10.1)
CHLORIDE SERPL-SCNC: 104 MMOL/L (ref 94–109)
CO2 SERPL-SCNC: 28 MMOL/L (ref 20–32)
COLOR UR AUTO: YELLOW
CREAT SERPL-MCNC: 0.98 MG/DL (ref 0.66–1.25)
DIFFERENTIAL METHOD BLD: ABNORMAL
EOSINOPHIL # BLD AUTO: 0 10E9/L (ref 0–0.7)
EOSINOPHIL NFR BLD AUTO: 0.2 %
ERYTHROCYTE [DISTWIDTH] IN BLOOD BY AUTOMATED COUNT: 13.1 % (ref 10–15)
GFR SERPL CREATININE-BSD FRML MDRD: 76 ML/MIN/1.7M2
GLUCOSE SERPL-MCNC: 118 MG/DL (ref 70–99)
GLUCOSE UR STRIP-MCNC: NEGATIVE MG/DL
HCT VFR BLD AUTO: 44.3 % (ref 40–53)
HGB BLD-MCNC: 15.1 G/DL (ref 13.3–17.7)
HGB UR QL STRIP: ABNORMAL
IMM GRANULOCYTES # BLD: 0 10E9/L (ref 0–0.4)
IMM GRANULOCYTES NFR BLD: 0.3 %
KETONES UR STRIP-MCNC: NEGATIVE MG/DL
LEUKOCYTE ESTERASE UR QL STRIP: ABNORMAL
LIPASE SERPL-CCNC: 194 U/L (ref 73–393)
LYMPHOCYTES # BLD AUTO: 0.3 10E9/L (ref 0.8–5.3)
LYMPHOCYTES NFR BLD AUTO: 4.4 %
MCH RBC QN AUTO: 30.3 PG (ref 26.5–33)
MCHC RBC AUTO-ENTMCNC: 34.1 G/DL (ref 31.5–36.5)
MCV RBC AUTO: 89 FL (ref 78–100)
MONOCYTES # BLD AUTO: 0.1 10E9/L (ref 0–1.3)
MONOCYTES NFR BLD AUTO: 1.5 %
MUCOUS THREADS #/AREA URNS LPF: PRESENT /LPF
NEUTROPHILS # BLD AUTO: 6.2 10E9/L (ref 1.6–8.3)
NEUTROPHILS NFR BLD AUTO: 93.3 %
NITRATE UR QL: POSITIVE
NRBC # BLD AUTO: 0 10*3/UL
NRBC BLD AUTO-RTO: 1 /100
PH UR STRIP: 7 PH (ref 5–7)
PLATELET # BLD AUTO: 106 10E9/L (ref 150–450)
POTASSIUM SERPL-SCNC: 3.5 MMOL/L (ref 3.4–5.3)
PROT SERPL-MCNC: 7.4 G/DL (ref 6.8–8.8)
RBC # BLD AUTO: 4.98 10E12/L (ref 4.4–5.9)
RBC #/AREA URNS AUTO: >182 /HPF (ref 0–2)
SODIUM SERPL-SCNC: 143 MMOL/L (ref 133–144)
SP GR UR STRIP: 1.01 (ref 1–1.03)
URN SPEC COLLECT METH UR: ABNORMAL
UROBILINOGEN UR STRIP-MCNC: NORMAL MG/DL (ref 0–2)
WBC # BLD AUTO: 6.7 10E9/L (ref 4–11)
WBC #/AREA URNS AUTO: >182 /HPF (ref 0–2)
WBC CLUMPS #/AREA URNS HPF: PRESENT /HPF

## 2017-03-11 PROCEDURE — 99284 EMERGENCY DEPT VISIT MOD MDM: CPT | Mod: Z6 | Performed by: EMERGENCY MEDICINE

## 2017-03-11 RX ORDER — CIPROFLOXACIN 500 MG/1
500 TABLET, FILM COATED ORAL EVERY 12 HOURS
Qty: 27 TABLET | Refills: 0 | Status: ON HOLD | OUTPATIENT
Start: 2017-03-11 | End: 2017-03-14

## 2017-03-11 RX ORDER — CEFTRIAXONE 1 G/1
1 INJECTION, POWDER, FOR SOLUTION INTRAMUSCULAR; INTRAVENOUS ONCE
Status: COMPLETED | OUTPATIENT
Start: 2017-03-11 | End: 2017-03-11

## 2017-03-11 RX ORDER — CIPROFLOXACIN 500 MG/1
500 TABLET, FILM COATED ORAL ONCE
Status: COMPLETED | OUTPATIENT
Start: 2017-03-11 | End: 2017-03-11

## 2017-03-11 RX ORDER — ACETAMINOPHEN 325 MG/1
650 TABLET ORAL ONCE
Status: COMPLETED | OUTPATIENT
Start: 2017-03-11 | End: 2017-03-11

## 2017-03-11 RX ADMIN — CEFTRIAXONE SODIUM 1 G: 1 INJECTION, POWDER, FOR SOLUTION INTRAMUSCULAR; INTRAVENOUS at 21:32

## 2017-03-11 RX ADMIN — CIPROFLOXACIN HYDROCHLORIDE 500 MG: 500 TABLET, FILM COATED ORAL at 22:04

## 2017-03-11 RX ADMIN — ACETAMINOPHEN 650 MG: 325 TABLET, FILM COATED ORAL at 22:13

## 2017-03-11 ASSESSMENT — ENCOUNTER SYMPTOMS
LIGHT-HEADEDNESS: 0
FREQUENCY: 1
FATIGUE: 0
NAUSEA: 0
POLYDIPSIA: 0
AGITATION: 0
NECK PAIN: 0
FEVER: 1
DIFFICULTY URINATING: 1
ADENOPATHY: 0
BACK PAIN: 0
VOMITING: 0
CHILLS: 0
DIARRHEA: 0
COLOR CHANGE: 0
PHOTOPHOBIA: 0
COUGH: 0
ABDOMINAL PAIN: 0
NECK STIFFNESS: 0
BRUISES/BLEEDS EASILY: 0

## 2017-03-11 NOTE — ED AVS SNAPSHOT
[facility], [place], Emergency Department    [address] [place] [address]    Phone:  [phone]                                       Chester Palacios   MRN: [id number]    Department:  [facility], Emergency Department   Date of Visit:  3/11/2017           After Visit Summary Signature Page     I have received my discharge instructions, and my questions have been answered. I have discussed any challenges I see with this plan with the nurse or doctor.    ..........................................................................................................................................  Patient/Patient Representative Signature      ..........................................................................................................................................  Patient Representative Print Name and Relationship to Patient    ..................................................               ................................................  Date                                            Time    ..........................................................................................................................................  Reviewed by Signature/Title    ...................................................              ..............................................  Date                                                            Time

## 2017-03-11 NOTE — ED AVS SNAPSHOT
[facility], Emergency Department    [address] [address]    Phone:  [phone]                                       Chester Palacios   MRN: [id number]    Department:  [facility], Emergency Department   Date of Visit:  3/11/2017           Patient Information     Date Of Birth          1945        Your diagnoses for this visit were:     Acute cystitis with hematuria        You were seen by Joseline Devine MD.        Discharge Instructions       Please make an appointment to follow up with Urology Clinic (phone: [phone]) in 2-3 days for further evaluation and recommendations.    Understanding Urinary Tract Infections (UTIs)  Most UTIs are caused by bacteria, although they may also be caused by viruses or fungi. Bacteria from the bowel are the most common source of infection. The infection may begin because of any of the following:    Sexual activity. During sex, germs can travel from the penis, vagina, or rectum into the urethra.     Germs on the skin outside the rectum may travel into the urethra. This is more common in women since the rectum and urethra are closer to each other than in men. Wiping from front to back after using the toilet and keeping the area clean can help prevent germs from getting to the urethra.    Blockage of urine flow through the urinary tract. If urine sits too long, germs may begin to grow out of control.      Parts of the urinary tract  The infection can occur in any part of the urinary tract.    The kidneys collect and store urine.    The ureters carry urine from the kidneys to the bladder.    The bladder holds urine until you are ready to let it out.    The urethra carries urine from the bladder out of the body. It is shorter in women, so bacteria can move through it more easily. The urethra is longer in men, so a UTI is less likely to reach the bladder or kidneys in men.    3406-6815 [employer]. [address] [place] [address], [place]  [address]. All rights reserved. This information is not intended as a substitute for professional medical care. Always follow your healthcare professional's instructions.            Future Appointments        Provider Department Dept Phone Center    3/17/2017 4:30 PM Rosa Macias MD [facility] and [facility] Prostate and Urologic Cancers [phone] [facility]    4/3/2017 1:30 PM Dariel Jack MD [facility] [phone] [place]      24 Hour Appointment Hotline       To make an appointment at any [facility], call [phone] ([phone]). If you don't have a family doctor or clinic, we will help you find one. [facility] are conveniently located to serve the needs of you and your family.             Review of your medicines      START taking        Dose / Directions Last dose taken    ciprofloxacin 500 MG tablet   Commonly known as:  CIPRO   Dose:  500 mg   Quantity:  27 tablet        Take 1 tablet (500 mg) by mouth every 12 hours for 14 days   Refills:  0          Our records show that you are taking the medicines listed below. If these are incorrect, please call your family doctor or clinic.        Dose / Directions Last dose taken    amoxicillin 500 MG tablet   Commonly known as:  AMOXIL   Dose:  500 mg        Take 500 mg by mouth See Admin Instructions Take 2000 mg 60 min prior to dental work   Refills:  0        aspirin 81 MG tablet   Dose:  81 mg        Take 81 mg by mouth every morning   Refills:  0        atorvastatin 40 MG tablet   Commonly known as:  LIPITOR   Dose:  40 mg   Quantity:  90 tablet        Take 1 tablet (40 mg) by mouth daily   Refills:  3        busPIRone 10 MG tablet   Commonly known as:  BUSPAR        Take by mouth 3 times daily 20 mg QA, 10 mg every afternoon, 20 mg HS   Refills:  0        diclofenac 1 % Gel topical gel   Commonly known as:  VOLTAREN   Quantity:  100 g        Place onto the skin as needed for moderate pain  Patient uses prn on hands for playing [employer].   Refills:  3        docusate sodium 100 MG tablet   Commonly known as:  COLACE   Dose:  100 mg   Quantity:  60 tablet        Take 100 mg by mouth 2 times daily   Refills:  1        DOXEPIN HCL PO        Take by mouth At Bedtime Patient states that he takes 1.25 mL HS, he is unsure of the concentration.   Refills:  0        oxyCODONE 5 MG IR tablet   Commonly known as:  ROXICODONE   Dose:  5-10 mg   Quantity:  30 tablet        Take 1-2 tablets (5-10 mg) by mouth every 3 hours as needed for pain or other (Moderate to Severe)   Refills:  0        tamsulosin 0.4 MG capsule   Commonly known as:  FLOMAX   Dose:  0.4 mg   Quantity:  30 capsule        Take 1 capsule (0.4 mg) by mouth daily   Refills:  1                Prescriptions were sent or printed at these locations (1 Prescription)                   Other Prescriptions                Printed at Department/Unit printer (1 of 1)         ciprofloxacin (CIPRO) 500 MG tablet                Procedures and tests performed during your visit     Procedure/Test Number of Times Performed    Bladder scan 1    Blood culture 2    CBC with platelets differential 1    Cardiac Continuous Monitoring 1    Comprehensive metabolic panel 1    Lipase 1    Peripheral IV catheter 1    Pulse oximetry 1    UA with Microscopic 1    Urine Culture 1      Orders Needing Specimen Collection     None      Pending Results     Date and Time Order Name Status Description    3/11/2017 1940 Blood culture In process     3/11/2017 1940 Blood culture In process     3/11/2017 1940 Urine Culture Preliminary             Pending Culture Results     Date and Time Order Name Status Description    3/11/2017 1940 Blood culture In process     3/11/2017 1940 Blood culture In process     3/11/2017 1940 Urine Culture Preliminary             Thank you for choosing [facility]       Thank you for choosing [facility] for your care. Our goal is always to provide you with excellent  care. Hearing back from our patients is one way we can continue to improve our services. Please take a few minutes to complete the written survey that you may receive in the mail after you visit with us. Thank you!        [employer] Information     [facility] gives you secure access to your electronic health record. If you see a primary care provider, you can also send messages to your care team and make appointments. If you have questions, please call your primary care clinic.  If you do not have a primary care provider, please call [phone] and they will assist you.        Care EveryWhere ID     This is your Care EveryWhere ID. This could be used by other organizations to access your [facility] medical records  [id number]        After Visit Summary       This is your record. Keep this with you and show to your community pharmacist(s) and doctor(s) at your next visit.

## 2017-03-12 ENCOUNTER — TELEPHONE (OUTPATIENT)
Dept: NURSING | Facility: CLINIC | Age: 72
End: 2017-03-12

## 2017-03-12 ENCOUNTER — HOSPITAL ENCOUNTER (INPATIENT)
Facility: CLINIC | Age: 72
LOS: 2 days | Discharge: HOME | DRG: 872 | End: 2017-03-14
Attending: EMERGENCY MEDICINE | Admitting: INTERNAL MEDICINE
Payer: MEDICARE

## 2017-03-12 DIAGNOSIS — Z98.890 PERSONAL HISTORY OF SURGERY TO HEART AND GREAT VESSELS, PRESENTING HAZARDS TO HEALTH: ICD-10-CM

## 2017-03-12 DIAGNOSIS — R78.81 BACTEREMIA: ICD-10-CM

## 2017-03-12 LAB
ALBUMIN SERPL-MCNC: 2.8 G/DL (ref 3.4–5)
ALBUMIN UR-MCNC: 100 MG/DL
ALP SERPL-CCNC: 98 U/L (ref 40–150)
ALT SERPL W P-5'-P-CCNC: 20 U/L (ref 0–70)
ANION GAP SERPL CALCULATED.3IONS-SCNC: 7 MMOL/L (ref 3–14)
APPEARANCE UR: ABNORMAL
AST SERPL W P-5'-P-CCNC: 8 U/L (ref 0–45)
BASOPHILS # BLD AUTO: 0.1 10E9/L (ref 0–0.2)
BASOPHILS NFR BLD AUTO: 0.5 %
BILIRUB SERPL-MCNC: 0.5 MG/DL (ref 0.2–1.3)
BILIRUB UR QL STRIP: NEGATIVE
BUN SERPL-MCNC: 29 MG/DL (ref 7–30)
CALCIUM SERPL-MCNC: 8.2 MG/DL (ref 8.5–10.1)
CHLORIDE SERPL-SCNC: 107 MMOL/L (ref 94–109)
CO2 SERPL-SCNC: 29 MMOL/L (ref 20–32)
COLOR UR AUTO: ABNORMAL
CREAT SERPL-MCNC: 1.27 MG/DL (ref 0.66–1.25)
CRP SERPL-MCNC: 160 MG/L (ref 0–8)
DIFFERENTIAL METHOD BLD: ABNORMAL
EOSINOPHIL # BLD AUTO: 0.1 10E9/L (ref 0–0.7)
EOSINOPHIL NFR BLD AUTO: 1.2 %
ERYTHROCYTE [DISTWIDTH] IN BLOOD BY AUTOMATED COUNT: 13.4 % (ref 10–15)
ERYTHROCYTE [DISTWIDTH] IN BLOOD BY AUTOMATED COUNT: 13.4 % (ref 10–15)
GFR SERPL CREATININE-BSD FRML MDRD: 56 ML/MIN/1.7M2
GLUCOSE SERPL-MCNC: 115 MG/DL (ref 70–99)
GLUCOSE UR STRIP-MCNC: NEGATIVE MG/DL
HCT VFR BLD AUTO: 37.7 % (ref 40–53)
HCT VFR BLD AUTO: 40.7 % (ref 40–53)
HGB BLD-MCNC: 12.4 G/DL (ref 13.3–17.7)
HGB BLD-MCNC: 13.5 G/DL (ref 13.3–17.7)
HGB UR QL STRIP: ABNORMAL
IMM GRANULOCYTES # BLD: 0 10E9/L (ref 0–0.4)
IMM GRANULOCYTES NFR BLD: 0.2 %
KETONES UR STRIP-MCNC: NEGATIVE MG/DL
LACTATE BLD-SCNC: 1.1 MMOL/L (ref 0.7–2.1)
LEUKOCYTE ESTERASE UR QL STRIP: ABNORMAL
LYMPHOCYTES # BLD AUTO: 1 10E9/L (ref 0.8–5.3)
LYMPHOCYTES NFR BLD AUTO: 10.3 %
MCH RBC QN AUTO: 29.8 PG (ref 26.5–33)
MCH RBC QN AUTO: 29.9 PG (ref 26.5–33)
MCHC RBC AUTO-ENTMCNC: 32.9 G/DL (ref 31.5–36.5)
MCHC RBC AUTO-ENTMCNC: 33.2 G/DL (ref 31.5–36.5)
MCV RBC AUTO: 90 FL (ref 78–100)
MCV RBC AUTO: 91 FL (ref 78–100)
MONOCYTES # BLD AUTO: 0.9 10E9/L (ref 0–1.3)
MONOCYTES NFR BLD AUTO: 9 %
NEUTROPHILS # BLD AUTO: 7.6 10E9/L (ref 1.6–8.3)
NEUTROPHILS NFR BLD AUTO: 78.8 %
NITRATE UR QL: NEGATIVE
NRBC # BLD AUTO: 0 10*3/UL
NRBC BLD AUTO-RTO: 0 /100
PH UR STRIP: 6 PH (ref 5–7)
PLATELET # BLD AUTO: 100 10E9/L (ref 150–450)
PLATELET # BLD AUTO: 87 10E9/L (ref 150–450)
POTASSIUM SERPL-SCNC: 3.9 MMOL/L (ref 3.4–5.3)
PROT SERPL-MCNC: 6.4 G/DL (ref 6.8–8.8)
RBC # BLD AUTO: 4.16 10E12/L (ref 4.4–5.9)
RBC # BLD AUTO: 4.51 10E12/L (ref 4.4–5.9)
RBC #/AREA URNS AUTO: >182 /HPF (ref 0–2)
SODIUM SERPL-SCNC: 143 MMOL/L (ref 133–144)
SP GR UR STRIP: 1.02 (ref 1–1.03)
URN SPEC COLLECT METH UR: ABNORMAL
UROBILINOGEN UR STRIP-MCNC: NORMAL MG/DL (ref 0–2)
WBC # BLD AUTO: 8.2 10E9/L (ref 4–11)
WBC # BLD AUTO: 9.7 10E9/L (ref 4–11)
WBC #/AREA URNS AUTO: >182 /HPF (ref 0–2)
WBC CLUMPS #/AREA URNS HPF: PRESENT /HPF

## 2017-03-12 PROCEDURE — 85027 COMPLETE CBC AUTOMATED: CPT | Performed by: STUDENT IN AN ORGANIZED HEALTH CARE EDUCATION/TRAINING PROGRAM

## 2017-03-12 PROCEDURE — 99285 EMERGENCY DEPT VISIT HI MDM: CPT | Mod: 25 | Performed by: EMERGENCY MEDICINE

## 2017-03-12 PROCEDURE — 87040 BLOOD CULTURE FOR BACTERIA: CPT | Performed by: EMERGENCY MEDICINE

## 2017-03-12 PROCEDURE — A9270 NON-COVERED ITEM OR SERVICE: HCPCS | Mod: GY | Performed by: STUDENT IN AN ORGANIZED HEALTH CARE EDUCATION/TRAINING PROGRAM

## 2017-03-12 PROCEDURE — 80053 COMPREHEN METABOLIC PANEL: CPT | Performed by: EMERGENCY MEDICINE

## 2017-03-12 PROCEDURE — 25000132 ZZH RX MED GY IP 250 OP 250 PS 637: Mod: GY | Performed by: STUDENT IN AN ORGANIZED HEALTH CARE EDUCATION/TRAINING PROGRAM

## 2017-03-12 PROCEDURE — 99291 CRITICAL CARE FIRST HOUR: CPT | Mod: Z6 | Performed by: EMERGENCY MEDICINE

## 2017-03-12 PROCEDURE — 99223 1ST HOSP IP/OBS HIGH 75: CPT | Mod: AI | Performed by: INTERNAL MEDICINE

## 2017-03-12 PROCEDURE — 87086 URINE CULTURE/COLONY COUNT: CPT | Performed by: EMERGENCY MEDICINE

## 2017-03-12 PROCEDURE — 83605 ASSAY OF LACTIC ACID: CPT | Performed by: EMERGENCY MEDICINE

## 2017-03-12 PROCEDURE — 86140 C-REACTIVE PROTEIN: CPT | Performed by: EMERGENCY MEDICINE

## 2017-03-12 PROCEDURE — 25000128 H RX IP 250 OP 636: Performed by: EMERGENCY MEDICINE

## 2017-03-12 PROCEDURE — 25000128 H RX IP 250 OP 636: Performed by: STUDENT IN AN ORGANIZED HEALTH CARE EDUCATION/TRAINING PROGRAM

## 2017-03-12 PROCEDURE — 85025 COMPLETE CBC W/AUTO DIFF WBC: CPT | Performed by: EMERGENCY MEDICINE

## 2017-03-12 PROCEDURE — 36415 COLL VENOUS BLD VENIPUNCTURE: CPT | Performed by: STUDENT IN AN ORGANIZED HEALTH CARE EDUCATION/TRAINING PROGRAM

## 2017-03-12 PROCEDURE — 81001 URINALYSIS AUTO W/SCOPE: CPT | Performed by: EMERGENCY MEDICINE

## 2017-03-12 PROCEDURE — 96365 THER/PROPH/DIAG IV INF INIT: CPT | Performed by: EMERGENCY MEDICINE

## 2017-03-12 PROCEDURE — 12000008 ZZH R&B INTERMEDIATE UMMC

## 2017-03-12 RX ORDER — BUSPIRONE HYDROCHLORIDE 10 MG/1
20 TABLET ORAL AT BEDTIME
Status: DISCONTINUED | OUTPATIENT
Start: 2017-03-12 | End: 2017-03-14 | Stop reason: HOSPADM

## 2017-03-12 RX ORDER — SODIUM CHLORIDE 9 MG/ML
INJECTION, SOLUTION INTRAVENOUS CONTINUOUS
Status: DISCONTINUED | OUTPATIENT
Start: 2017-03-12 | End: 2017-03-13

## 2017-03-12 RX ORDER — TAMSULOSIN HYDROCHLORIDE 0.4 MG/1
0.4 CAPSULE ORAL DAILY
Status: DISCONTINUED | OUTPATIENT
Start: 2017-03-13 | End: 2017-03-14 | Stop reason: HOSPADM

## 2017-03-12 RX ORDER — DOXEPIN HYDROCHLORIDE 10 MG/ML
12.5 SOLUTION ORAL AT BEDTIME
Status: DISCONTINUED | OUTPATIENT
Start: 2017-03-12 | End: 2017-03-14 | Stop reason: HOSPADM

## 2017-03-12 RX ORDER — CEFTRIAXONE 1 G/1
1 INJECTION, POWDER, FOR SOLUTION INTRAMUSCULAR; INTRAVENOUS ONCE
Status: COMPLETED | OUTPATIENT
Start: 2017-03-12 | End: 2017-03-12

## 2017-03-12 RX ORDER — AMOXICILLIN 250 MG
1-2 CAPSULE ORAL 2 TIMES DAILY PRN
Status: DISCONTINUED | OUTPATIENT
Start: 2017-03-12 | End: 2017-03-14 | Stop reason: HOSPADM

## 2017-03-12 RX ORDER — ONDANSETRON 4 MG/1
4 TABLET, ORALLY DISINTEGRATING ORAL EVERY 6 HOURS PRN
Status: DISCONTINUED | OUTPATIENT
Start: 2017-03-12 | End: 2017-03-14 | Stop reason: HOSPADM

## 2017-03-12 RX ORDER — BUSPIRONE HYDROCHLORIDE 10 MG/1
10 TABLET ORAL 3 TIMES DAILY
Status: DISCONTINUED | OUTPATIENT
Start: 2017-03-12 | End: 2017-03-12 | Stop reason: CLARIF

## 2017-03-12 RX ORDER — ONDANSETRON 2 MG/ML
4 INJECTION INTRAMUSCULAR; INTRAVENOUS EVERY 6 HOURS PRN
Status: DISCONTINUED | OUTPATIENT
Start: 2017-03-12 | End: 2017-03-14 | Stop reason: HOSPADM

## 2017-03-12 RX ORDER — ASPIRIN 81 MG/1
81 TABLET, CHEWABLE ORAL EVERY MORNING
Status: DISCONTINUED | OUTPATIENT
Start: 2017-03-13 | End: 2017-03-14 | Stop reason: HOSPADM

## 2017-03-12 RX ORDER — ASPIRIN 81 MG
100 TABLET, DELAYED RELEASE (ENTERIC COATED) ORAL 2 TIMES DAILY
Status: DISCONTINUED | OUTPATIENT
Start: 2017-03-12 | End: 2017-03-12 | Stop reason: CLARIF

## 2017-03-12 RX ORDER — ATORVASTATIN CALCIUM 40 MG/1
40 TABLET, FILM COATED ORAL DAILY
Status: DISCONTINUED | OUTPATIENT
Start: 2017-03-13 | End: 2017-03-14 | Stop reason: HOSPADM

## 2017-03-12 RX ORDER — BUSPIRONE HYDROCHLORIDE 10 MG/1
10 TABLET ORAL DAILY
Status: DISCONTINUED | OUTPATIENT
Start: 2017-03-13 | End: 2017-03-14 | Stop reason: HOSPADM

## 2017-03-12 RX ORDER — HEPARIN SODIUM 5000 [USP'U]/.5ML
5000 INJECTION, SOLUTION INTRAVENOUS; SUBCUTANEOUS EVERY 12 HOURS
Status: DISCONTINUED | OUTPATIENT
Start: 2017-03-12 | End: 2017-03-14 | Stop reason: HOSPADM

## 2017-03-12 RX ORDER — ACETAMINOPHEN 325 MG/1
650 TABLET ORAL EVERY 4 HOURS PRN
Status: DISCONTINUED | OUTPATIENT
Start: 2017-03-12 | End: 2017-03-14 | Stop reason: HOSPADM

## 2017-03-12 RX ORDER — CEFTRIAXONE 1 G/1
1 INJECTION, POWDER, FOR SOLUTION INTRAMUSCULAR; INTRAVENOUS EVERY 24 HOURS
Status: DISCONTINUED | OUTPATIENT
Start: 2017-03-12 | End: 2017-03-14

## 2017-03-12 RX ORDER — BUSPIRONE HYDROCHLORIDE 10 MG/1
20 TABLET ORAL EVERY MORNING
Status: DISCONTINUED | OUTPATIENT
Start: 2017-03-13 | End: 2017-03-14 | Stop reason: HOSPADM

## 2017-03-12 RX ADMIN — SODIUM CHLORIDE: 9 INJECTION, SOLUTION INTRAVENOUS at 16:47

## 2017-03-12 RX ADMIN — SODIUM CHLORIDE 1000 ML: 9 INJECTION, SOLUTION INTRAVENOUS at 16:48

## 2017-03-12 RX ADMIN — HEPARIN SODIUM 5000 UNITS: 5000 INJECTION, SOLUTION INTRAVENOUS; SUBCUTANEOUS at 20:25

## 2017-03-12 RX ADMIN — SODIUM CHLORIDE 1000 ML: 9 INJECTION, SOLUTION INTRAVENOUS at 14:30

## 2017-03-12 RX ADMIN — CEFTRIAXONE SODIUM 1 G: 1 INJECTION, POWDER, FOR SOLUTION INTRAMUSCULAR; INTRAVENOUS at 14:30

## 2017-03-12 RX ADMIN — DOXEPIN HYDROCHLORIDE 12.5 MG: 10 SOLUTION ORAL at 22:04

## 2017-03-12 RX ADMIN — BUSPIRONE HYDROCHLORIDE 20 MG: 10 TABLET ORAL at 22:05

## 2017-03-12 ASSESSMENT — ENCOUNTER SYMPTOMS
DIAPHORESIS: 0
EYE REDNESS: 0
CHILLS: 0
COLOR CHANGE: 0
FREQUENCY: 0
WEAKNESS: 1
ABDOMINAL PAIN: 0
SHORTNESS OF BREATH: 0
DYSURIA: 1
ARTHRALGIAS: 0
DIFFICULTY URINATING: 0
CONFUSION: 0
HEADACHES: 0
FEVER: 0
NECK STIFFNESS: 0

## 2017-03-12 NOTE — CONSULTS
Urology Consult    Name: Chester Palacios    MRN: [id number]   YOB: 1945       We were asked to see Chester Palacios at the request ER staff for evaluation and treatment of the following chief complaint.        Chief Complaint:   Post-operative bacteremia          History of Present Illness:   Chester Palacios is a 71 year old male who recently underwent TURP on 3/8/2017 and now presents to the ER for evaluation of fever along with urinary complaints.  The patient tolerated the procedure with no apparent complications and was discharged to home on POD 1.  Shortly after discharge the patient developed increased urinary urgency, frequency, dysuria and weak stream.  As symptoms persisted he soon developed subjective fevers and sensation of incomplete emptying.  On 3/11 he contacted the urology service and was advised to seek further evaluation for possible retention and UTI.  The patient presented to the ER on 3/11 where he was found to be afebrile and hemodynamically stable with adequate emptying on bladder scan and normal WBC on laboratory evaluation.  UA was found to be consistent with infection (Nitrite positive, Large LE).  Patient was given a single dose of ceftriaxone and discharged on empiric course of ciprofloxacin.  The patient did not fill the prescription. Urine and blood cultures subsequently grew e. coli with patient advised to return to the hospital for further treatment.   On presentation he was again afebrile and hemodynamically stable with a normal white count.  Urology was consulted for further evaluation.      On bedside evaluation the patient denies subjective fevers and chills.  He further reports improvement in previously reported urinary symptoms.            Past Medical History:     Past Medical History   Diagnosis Date     Anxiety state 10/26/2012     Works with psychiatrist      Arthritis of right hip 12/21/2015     Coronary artery calcification seen on computed  tomography 2016     See Cardiology consult ; cardiac MRI shows EF 60%, no scar       Family history of hemochromatosis 10/25/2015     Brother; Chester had nml labs in       Hyperlipidemia with target LDL less than 130 10/26/2012      Diagnosis updated by automated process. Provider to review and confirm.     Lower urinary tract symptoms (LUTS) 2016     See urology consult 10/16       Osteoarthritis, unspecified osteoarthritis type, unspecified site 2016            Past Surgical History:     Past Surgical History   Procedure Laterality Date     Prostate surgery       TUNA for BPH     Orthopedic surgery       R elbow Orif     Hernia repair       Inguinal     Tonsillectomy       age 5     Joint replacement, hip rt/lt Right      Joint Replacement Hip RT/LT     Cystoscopy, transurethral resection (tur) prostate, combined N/A 3/8/2017     Procedure: COMBINED CYSTOSCOPY, TRANSURETHRAL RESECTION (TUR) PROSTATE;  Surgeon: Rosa Macias MD;  Location:  OR            Social History:     Social History   Substance Use Topics     Smoking status: Never Smoker     Smokeless tobacco: Never Used     Alcohol use Yes            Family History:     Family History   Problem Relation Age of Onset     HEART DISEASE Father       age 56; had chest pain,  in the hospital soon after; was a smoker     Genetic Disorder Brother      +HFE gene mutation, but does not have hemochromatosis, but has phlebotomies     Blood Disease Mother      hemolytic anemia;  age 85            Allergies:     Allergies   Allergen Reactions     Animal Dander      cats            Medications:     Current Facility-Administered Medications   Medication     0.9% sodium chloride BOLUS     Current Outpatient Prescriptions   Medication Sig     ciprofloxacin (CIPRO) 500 MG tablet Take 1 tablet (500 mg) by mouth every 12 hours for 14 days     docusate sodium (COLACE) 100 MG tablet Take 100 mg by mouth 2 times daily      oxyCODONE (ROXICODONE) 5 MG IR tablet Take 1-2 tablets (5-10 mg) by mouth every 3 hours as needed for pain or other (Moderate to Severe)     amoxicillin (AMOXIL) 500 MG tablet Take 500 mg by mouth See Admin Instructions Take 2000 mg 60 min prior to dental work     tamsulosin (FLOMAX) 0.4 MG capsule Take 1 capsule (0.4 mg) by mouth daily (Patient taking differently: Take 0.4 mg by mouth every morning )     atorvastatin (LIPITOR) 40 MG tablet Take 1 tablet (40 mg) by mouth daily (Patient taking differently: Take 40 mg by mouth every morning )     DOXEPIN HCL PO Take by mouth At Bedtime Patient states that he takes 1.25 mL HS, he is unsure of the concentration.     aspirin 81 MG tablet Take 81 mg by mouth every morning      diclofenac (VOLTAREN) 1 % GEL Place onto the skin as needed for moderate pain Patient uses prn on hands for playing guitar.     busPIRone (BUSPAR) 10 MG tablet Take by mouth 3 times daily 20 mg QA, 10 mg every afternoon, 20 mg HS             Review of Systems:    ROS: 10 point ROS neg other than the symptoms noted above in the HPI           Physical Exam:   VS:  T: 97.4    HR: 60    BP: 97/58    RR: 16   GEN:  AOx3.  NAD.    LUNGS: Non-labored breathing.   BACK:  No midline or CVA tenderness.  ABD:  Soft.  NT.  ND.  No rebound or guarding.  No masses.  :  Normal penile shaft.  EXT:  Warm, well perfused.   SKIN:  Warm.  Dry.  No rashes.          Data:   All laboratory data reviewed:      Recent Labs  Lab 03/12/17  1418 03/11/17 2004 03/09/17  0737   WBC 9.7 6.7 8.0   HGB 13.5 15.1 13.9   * 106* 147*       Recent Labs  Lab 03/12/17  1418 03/11/17 2004 03/09/17  0737    143 138   POTASSIUM 3.9 3.5 3.9   CHLORIDE 107 104 106   CO2 29 28 25   BUN 29 20 14   CR 1.27* 0.98 0.92   * 118* 108*   MARIANO 8.2* 9.0 8.6       Recent Labs  Lab 03/11/17 1957   COLOR Yellow   APPEARANCE Slightly Cloudy   URINEGLC Negative   URINEBILI Negative   URINEKETONE Negative   SG 1.015   URINEPH  7.0   PROTEIN 100*   NITRITE Positive*   LEUKEST Large*   RBCU >182*   WBCU >182*       All pertinent imaging reviewed:           Impression and Plan:   Impression:   Chester Palacios is a 71 year old male presents with e. coli bacteremia of apparent urinary origin following recent TURP.  Patient remains afebrile and hemodynamically stable.  Although patient reports weak urinary stream post-operatively, bladder scan reveals adequate emptying.  In light of the foregoing, no urologic intervention indicated at this time.  Plan to admit to medicine for IV antibiotics with urology following closely      Plan:    -Antibiotics per primary team  -Closely monitor urine output with low threshold to repeat bladder scan    This patient's exam findings, labs, and imaging discussed with urology staff surgeon Dr. Mcconnell, who developed the treatment plan.    Navi Lei MD  Urology Resident

## 2017-03-12 NOTE — ED NOTES
Bed: IN03  Expected date:   Expected time:   Means of arrival:   Comments:  Chester fowler [phone] right hand gm negative rods

## 2017-03-12 NOTE — TELEPHONE ENCOUNTER
"Call Type: Triage Call    Presenting Problem: Patient calling verbalizing concern of the known  side effects of tendon damage from taking Ciprofloxacin, which he  was prescribed last night when in ER. Patient is requesting a  prescription for \"Cefuroxime\" that he has taken in the past for UTI.  Patient was conferenced to [facility] attending physcian for  assistance.  Triage Note:  Guideline Title: No Guideline - Advice Per Reference (Adult)  Recommended Disposition: Call Provider Immediately  Original Inclination: Would have called ED  Override Disposition:  Intended Action: Call PCP/HCP  Physician Contacted: No  CALL PROVIDER IMMEDIATELY ?  YES  SEE ED IMMEDIATELY ? NO  ACTIVATE  ? NO  Physician Instructions:  Care Advice:  "

## 2017-03-12 NOTE — H&P
Medicine H&P and Admission Note   03/12/2017    Chester Palacios MRN# [id number]   Age: 71 year old  Sex: male YOB: 1945  Date of Admission: 3/12/2017          Assessment and Plan:   72 y/o male with PMH significant for anxiety, HLD, and BPH s/p TURP on 3/8/17 now presenting with UTI with subsequent bacteremia    # Bacteremia / UTI  - Urine culture (3/11/17) growing E. coli, sensitivity pending  - Blood culture (3/11/17) positive for Gram - jeanne, Verigene test showed E. coli  - Urine culture and blood cultures taken today (3/12/17)  - Ceftriaxone 1g q24h  - Platelets 106 today (3/12/17), down from 147 on 3/11/17. Continue trending CBC.    # Anxiety  - Continue home buspirone and doxepin    # HLD  - Continue home atorvastatin and aspirin    Summary  FEN: 1L bolus of NS given in ED, patient then initiated on 100mL of NS. Regular adult diet.   Prophylaxis  - Lung: Incentive spirometer  - DVT: SubQ heparin  - GI: None  Code Status: FULL    Disposition:    Patient was seen and discussed with Dr. Salvador ROWELL.     Dhiraj Daniels DDS  Oral & Maxillofacial Surgery Resident  PGY-1  [phone]     Subjective:   Chester Palacios is a 72 y/o male who recently underwent TURP on 3/8/2017 and now presents to the ER for evaluation of fever along with continued urinary complaints. The patient tolerated the aforementioned procedure with no apparent complications and was discharged to home on post-op day #1. Shortly after discharge, the patient developed increased urinary urgency, frequency, dysuria, and weak stream. As symptoms persisted, he soon reported subjective fevers and sensation of incomplete emptying. On 3/11/17 he contacted the urology service and was advised to seek further evaluation for possible retention and UTI. The patient presented to the [facility] ED on 3/11 where he was found to be afebrile and hemodynamically stable with adequate emptying on bladder scan and normal WBC on laboratory evaluation. Urine and  blood cultures were obtained, the patinet was given a single dose of ceftriaxone, and discharged on course of ciprofloxacin. The patient did not fill the prescription due to concern of tendonitis associated with the medication. Urine and blood cultures subsequently grew E. coli, and the patient was called and advised to return to the hospital for further treatment.     The patient reports he slept very well last night after being discharged from the ED yesterday evening, waking only twice to urinate. Prior to his TURP, he woke during the night 4-5 times to urinate. He has taken 325mg acetaminophen twice since he was discharged at 0200 and 0900. He felt much better today than yesterday (3/11/17) but felt some general fatigue and weakness after lunch. He denies any fevers/chills, major change in appetite, itchiness, rashes, sores, nasal congestion or runniness, or difficulty or obstruction on urination. He continues to endorse pain on urination (consistent since the appearance of the dysuria on 3/11/17) and penile pain. He is with his wife, Guerita, who reports that he did have some changes in mental status yesterday when he had subjected fevers but has since returned to baseline. His last bowel movement was    ROS: Remainder of 10 point ROS negative other than the symptoms noted above in the HPI.    Past Medical History   Diagnosis Date     Anxiety state 10/26/2012     Works with psychiatrist      Arthritis of right hip 12/21/2015     Coronary artery calcification seen on computed tomography 12/12/2016     See Cardiology consult 1/17; cardiac MRI shows EF 60%, no scar       Family history of hemochromatosis 10/25/2015     Brother; Chester had nml labs in 2010      Hyperlipidemia with target LDL less than 130 10/26/2012      Diagnosis updated by automated process. Provider to review and confirm.     Lower urinary tract symptoms (LUTS) 11/5/2016     See urology consult 10/16       Osteoarthritis, unspecified  osteoarthritis type, unspecified site 2016     Past Surgical History   Procedure Laterality Date     Prostate surgery       TUNA for BPH     Orthopedic surgery       R elbow Orif     Hernia repair       Inguinal     Tonsillectomy       age 5     Joint replacement, hip rt/lt Right      Joint Replacement Hip RT/LT     Cystoscopy, transurethral resection (tur) prostate, combined N/A 3/8/2017     Procedure: COMBINED CYSTOSCOPY, TRANSURETHRAL RESECTION (TUR) PROSTATE;  Surgeon: Rosa Macias MD;  Location: [facility] OR     Allergies   Allergen Reactions     Animal Dander      cats     Family History   Problem Relation Age of Onset     HEART DISEASE Father       age 56; had chest pain,  in the hospital soon after; was a smoker     Genetic Disorder Brother      +HFE gene mutation, but does not have hemochromatosis, but has phlebotomies     Blood Disease Mother      hemolytic anemia;  age 85     Social History     Social History     Marital status:      Spouse name: Guerita     Number of children: 2     Occupational History     , 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     Social History Main Topics     Smoking status: Never Smoker     Smokeless tobacco: Never Used     Alcohol use Yes     Drug use: No     Sexual activity: Yes     Partners: Female     Other Topics Concern     Parent/Sibling W/ Cabg, Mi Or Angioplasty Before 65f 55m? Yes     Current Outpatient Prescriptions on File Prior to Encounter:  ciprofloxacin (CIPRO) 500 MG tablet Take 1 tablet (500 mg) by mouth every 12 hours for 14 days   docusate sodium (COLACE) 100 MG tablet Take 100 mg by mouth 2 times daily   oxyCODONE (ROXICODONE) 5 MG IR tablet Take 1-2 tablets (5-10 mg) by mouth every 3 hours as needed for pain or other (Moderate to Severe)   amoxicillin (AMOXIL) 500 MG tablet Take 500 mg by mouth See Admin Instructions Take 2000 mg 60 min prior to dental work   tamsulosin (FLOMAX) 0.4 MG capsule Take 1 capsule (0.4 mg)  by mouth daily (Patient taking differently: Take 0.4 mg by mouth every morning )   atorvastatin (LIPITOR) 40 MG tablet Take 1 tablet (40 mg) by mouth daily (Patient taking differently: Take 40 mg by mouth every morning )   DOXEPIN HCL PO Take by mouth At Bedtime Patient states that he takes 1.25 mL HS, he is unsure of the concentration.   aspirin 81 MG tablet Take 81 mg by mouth every morning    diclofenac (VOLTAREN) 1 % GEL Place onto the skin as needed for moderate pain Patient uses prn on hands for playing [employer].   busPIRone (BUSPAR) 10 MG tablet Take by mouth 3 times daily 20 mg QA, 10 mg every afternoon, 20 mg HS      Physical Exam:   Vitals:  Temp:  [97.4  F (36.3  C)-101.4  F (38.6  C)] 97.4  F (36.3  C)  Pulse:  [60-74] 60  Heart Rate:  [75-93] 89  Resp:  [16-18] 16  BP: ()/(44-88) 95/52  SpO2:  [94 %-100 %] 95 %    Temp  Av.2  F (36.8  C)  Min: 97.4  F (36.3  C)  Max: 101.4  F (38.6  C)      Pulse  Av  Min: 55  Max: 74 Resp  Avg: 15.6  Min: 12  Max: 18  SpO2  Av.6 %  Min: 93 %  Max: 100 %      Wt Readings from Last 4 Encounters:   17 72.6 kg (160 lb)   17 74.1 kg (163 lb 5.8 oz)   17 75.3 kg (166 lb 1.6 oz)   17 72.6 kg (160 lb)     General: Appears well, sitting in bed, in no acute distress.  Heme/Lymph: No overt bleeding. No palpable cervical or clavicular adenopathy.  Skin: No concerning rash, jaundice or ecchymoses   HEENT: No subconjunctival hemorrhage noted bilaterally. Anicteric sclera. No swelling or masses. Neck soft and supple.   Respiratory: Non-labored breathing, lungs clear to auscultation bilaterally.  Cardiovascular: Regular rate and rhythm, normal S1 and S2  Gastrointestinal: Normoactive bowel sounds. Abdomen soft, non-distended, and non-tender. No palpable organomegaly.  Genitourinary: Made urine during exam. No penile erythema or purulence. Urine dark, not cloudy.  Extremities: No extremity edema  Neurologic: A&Ox3 (oriented to self, date, and  place), speech normal         Laboratory Data:   ROUTINE ICU LABS (Last four results)  CMP  Recent Labs  Lab 03/11/17 2004 03/09/17  0737    138   POTASSIUM 3.5 3.9   CHLORIDE 104 106   CO2 28 25   ANIONGAP 11 7   * 108*   BUN 20 14   CR 0.98 0.92   GFRESTIMATED 76 80   GFRESTBLACK >90African American GFR Calc >90African American GFR Calc   MARIANO 9.0 8.6   PROTTOTAL 7.4  --    ALBUMIN 3.4  --    BILITOTAL 0.7  --    ALKPHOS 114  --    AST 13  --    ALT 21  --      CBC  Recent Labs  Lab 03/11/17 2004 03/09/17  0737   WBC 6.7 8.0   RBC 4.98 4.59   HGB 15.1 13.9   HCT 44.3 41.2   MCV 89 90   MCH 30.3 30.3   MCHC 34.1 33.7   RDW 13.1 13.1   * 147*     CRP = 160.0    Culture data:  - Urine culture (3/11/17) growing E. coli, sensitivity pending  - Blood culture (3/11/17) positive for Gram - jeanne, Verigene test showed E. coli  - Urine culture and blood cultures taken on 3/12/17         Imaging:   No results found for this or any previous visit (from the past 24 hour(s)).

## 2017-03-12 NOTE — IP AVS SNAPSHOT
MRN:[id number]                      After Visit Summary   3/12/2017    Chester Palacios    MRN: [id number]           Thank you!     Thank you for choosing [facility] for your care. Our goal is always to provide you with excellent care. Hearing back from our patients is one way we can continue to improve our services. Please take a few minutes to complete the written survey that you may receive in the mail after you visit with us. Thank you!        Patient Information     Date Of Birth          1945        About your hospital stay     You were admitted on:  March 12, 2017 You last received care in the:  [address] [facility] [facility]    You were discharged on:  March 14, 2017        Reason for your hospital stay       You were in the hospital for a urinary tract infection and bacteremia (bacteria in the blood) that occurred after your prostate procedure. You were given antibiotics through your IV and transitioned to oral antibiotics.                  Who to Call     For medical emergencies, please call 911.  For non-urgent questions about your medical care, please call your primary care provider or clinic, [phone]          Attending Provider     Provider Specialty    Zelalem Carballo MD Emergency Medicine    , MD Salvador Internal Medicine    Laine Bradley MD Internal Medicine       Primary Care Provider Office Phone # Fax #    Dariel Jack [phone]       [facility] [address] [address]  [facility] [address]         When to contact your care team       Call your primary doctor or return to the ED if you have any of the following: return of fever, rash, nausea / vomiting, diarrhea, return of cloudy urine, or painful urination                  After Care Instructions     Activity       Your activity upon discharge: activity as tolerated            Diet       Follow this diet upon discharge:      Combination Diet Regular Diet Adult            Discharge  Instructions       Take prescribed antibiotics until gone.                  Follow-up Appointments     Follow Up and recommended labs and tests       - Follow-up with the Urology service this Friday (3/17/17) for post-operative check    - Follow-up with your physician, Dr. Dariel Jack, on 4/3/17 as scheduled                  Your next 10 appointments already scheduled     Mar 17, 2017  4:30 PM CDT   (Arrive by 4:15 PM)   Post-Op with Rosa Macias MD   [facility]    [address]  4th [address]  [facility] [address] [phone]            Apr 03, 2017  1:30 PM CDT   [facility] MD   [facility]    [address]  [address]  [facility] [id number] [phone]           Bring a current list of meds and any records pertaining to this visit.  For Physicals, please bring immunization records and any forms needing to be filled out.  Please arrive 10 minutes early to complete paperwork.              Pending Results     Date and Time Order Name Status Description    3/14/2017 0100 Blood culture Preliminary     3/14/2017 0100 Blood culture In process     3/13/2017 0744 Blood culture Preliminary     3/13/2017 0744 Blood culture Preliminary     3/12/2017 1303 Blood culture Preliminary     3/12/2017 1303 Blood culture Preliminary             Statement of Approval     Ordered          03/14/17 1403  I have reviewed and agree with all the recommendations and orders detailed in this document.  EFFECTIVE NOW     Approved and electronically signed by:  Dhiraj Daniels MD             Admission Information     Date & Time Provider Department Dept. Phone    3/12/2017 Laine Bradley MD [address] [facility] [phone]      Your Vitals Were     Blood Pressure Pulse Temperature Respirations Weight Pulse Oximetry     109/44 64 96.7  F (35.9  C) 16 73.8 kg (162 lb 9.6 oz) 97%    BMI (Body Mass Index)                   23.33 kg/m2           [employer] Information     [employer] gives you secure access to your electronic health record. If you see a primary care provider, you can also send messages to your care team and make appointments. If you have questions, please call your primary care clinic.  If you do not have a primary care provider, please call [phone] and they will assist you.        Care EveryWhere ID     This is your Care EveryWhere ID. This could be used by other organizations to access your [facility] medical records  [id number]           Review of your medicines      CONTINUE these medicines which may have CHANGED, or have new prescriptions. If we are uncertain of the size of tablets/capsules you have at home, strength may be listed as something that might have changed.        Dose / Directions    * amoxicillin 500 MG tablet   Commonly known as:  AMOXIL   This may have changed:  Another medication with the same name was added. Make sure you understand how and when to take each.        Take 2000 mg 60 min prior to dental work   Refills:  0       * amoxicillin 500 MG capsule   Commonly known as:  AMOXIL   Indication:  Bacteria in the Blood   This may have changed:  You were already taking a medication with the same name, and this prescription was added. Make sure you understand how and when to take each.        Dose:  500 mg   Take 1 capsule (500 mg) by mouth every 8 hours for 11 days   Quantity:  33 capsule   Refills:  0       atorvastatin 40 MG tablet   Commonly known as:  LIPITOR   This may have changed:  when to take this   Used for:  Hyperlipidemia with target LDL less than 130        Dose:  40 mg   Take 1 tablet (40 mg) by mouth daily   Quantity:  90 tablet   Refills:  3       tamsulosin 0.4 MG capsule   Commonly known as:  FLOMAX   This may have changed:  when to take this   Used for:  Overactive bladder         Dose:  0.4 mg   Take 1 capsule (0.4 mg) by mouth daily   Quantity:  30 capsule   Refills:  1       * Notice:  This list has 2 medication(s) that are the same as other medications prescribed for you. Read the directions carefully, and ask your doctor or other care provider to review them with you.      CONTINUE these medicines which have NOT CHANGED        Dose / Directions    ACETAMINOPHEN PO        Dose:  650 mg   Take 650 mg by mouth every 4 hours as needed for pain   Refills:  0       aspirin 81 MG tablet        Dose:  81 mg   Take 81 mg by mouth every morning   Refills:  0       busPIRone 10 MG tablet   Commonly known as:  BUSPAR        Take by mouth 3 times daily 20 mg every morning, 10 mg every afternoon, 20 mg HS   Refills:  0       diclofenac 1 % Gel topical gel   Commonly known as:  VOLTAREN   Used for:  Arthritis of both hands        Place onto the skin as needed for moderate pain Patient uses prn on hands for playing guitar.   Quantity:  100 g   Refills:  3       docusate sodium 100 MG tablet   Commonly known as:  COLACE   Used for:  Benign prostatic hyperplasia with lower urinary tract symptoms, unspecified morphology        Dose:  100 mg   Take 100 mg by mouth 2 times daily   Quantity:  60 tablet   Refills:  1       DOXEPIN HCL PO        Take by mouth At Bedtime Patient states that he takes 1.25 mL HS (12.5mg/1.25mL).   Refills:  0         STOP taking     ciprofloxacin 500 MG tablet   Commonly known as:  CIPRO           oxyCODONE 5 MG IR tablet   Commonly known as:  ROXICODONE                Where to get your medicines      These medications were sent to [facility] [place], [place] - [address]  [address] [address]     Phone:  [phone]     amoxicillin 500 MG capsule                Protect others around you: Learn how to safely use, store and throw away your medicines at www.disposemymeds.org.             Medication List: This is a list of all your  medications and when to take them. Check marks below indicate your daily home schedule. Keep this list as a reference.      Medications           Morning Afternoon Evening Bedtime As Needed    ACETAMINOPHEN PO   Take 650 mg by mouth every 4 hours as needed for pain   Last time this was given:  650 mg on 3/13/2017  3:16 AM                                * amoxicillin 500 MG tablet   Commonly known as:  AMOXIL   Take 2000 mg 60 min prior to dental work                                * amoxicillin 500 MG capsule   Commonly known as:  AMOXIL   Take 1 capsule (500 mg) by mouth every 8 hours for 11 days   Last time this was given:  500 mg on 3/14/2017  1:38 PM                                aspirin 81 MG tablet   Take 81 mg by mouth every morning                                atorvastatin 40 MG tablet   Commonly known as:  LIPITOR   Take 1 tablet (40 mg) by mouth daily   Last time this was given:  40 mg on 3/14/2017  8:49 AM                                busPIRone 10 MG tablet   Commonly known as:  BUSPAR   Take by mouth 3 times daily 20 mg every morning, 10 mg every afternoon, 20 mg HS   Last time this was given:  10 mg on 3/14/2017  1:38 PM                                diclofenac 1 % Gel topical gel   Commonly known as:  VOLTAREN   Place onto the skin as needed for moderate pain Patient uses prn on hands for playing [employer].                                docusate sodium 100 MG tablet   Commonly known as:  COLACE   Take 100 mg by mouth 2 times daily                                DOXEPIN HCL PO   Take by mouth At Bedtime Patient states that he takes 1.25 mL HS (12.5mg/1.25mL).   Last time this was given:  12.5 mg on 3/13/2017  9:01 PM                                tamsulosin 0.4 MG capsule   Commonly known as:  FLOMAX   Take 1 capsule (0.4 mg) by mouth daily   Last time this was given:  0.4 mg on 3/14/2017  8:49 AM                                * Notice:  This list has 2 medication(s) that are the same as other  medications prescribed for you. Read the directions carefully, and ask your doctor or other care provider to review them with you.

## 2017-03-12 NOTE — TELEPHONE ENCOUNTER
I was contacted by Mr Palacios via the [employer]  regarding post-operative fever.  Mr Palacios underwent TURP on 3/8.  He tolerated the procedure well with an uneventful post-operative course.  The patient was discharged to home on 3/9.  Since discharge the patient reports increased urinary frequency, dysuria and a notably weakened stream compared to before surgery.  Today he reports new onset subjective fevers, suprapubic discomfort and sensation of incomplete emptying.  In light of these development patient was advised to seek further evaluation for possible retention and associated UTI.

## 2017-03-12 NOTE — ED PROVIDER NOTES
History     Chief Complaint   Patient presents with     Abnormal Labs     HPI  Chester Palacios is a 71 year old male who presents to the Emergency Department with abnormal labs. The patient underwent a TURP procedure 4 days (3/8/17) ago and was discharged the following day. He came to the ER yesterday with fever, chills, shakes, complaining of dysuria and penile pain. He was given ceftriaxone in the ER and discharged on Cipro. Blood cultures obtained yesterday are growing out gram negative rods and his urine culture is growing out E. Coli.     The patient states that he did not want Ciprofloxacin because he researched a side effect of tendon damage. He was then called about his lab results and was told to present to the ER. Patient states that he has felt some generalized weakness and dysuria. He states that the last time he urinated he felt that his flow was normal but he has pain at the tip of his penis when he initiates urination. He states that his dysuria started last night. He denies fevers, chills, frequency, sweats, and other symptoms.     Patient reports that he took Tylenol at 9:00 AM yesterday and two at 2:00 AM today. These were 325 mg.     I have reviewed the Medications, Allergies, Past Medical and Surgical History, and Social History in the [employer] system.    PAST MEDICAL HISTORY  Past Medical History   Diagnosis Date     Anxiety state 10/26/2012     Works with psychiatrist      Arthritis of right hip 12/21/2015     Coronary artery calcification seen on computed tomography 12/12/2016     See Cardiology consult 1/17; cardiac MRI shows EF 60%, no scar       Family history of hemochromatosis 10/25/2015     Brother; Chester had nml labs in 2010      Hyperlipidemia with target LDL less than 130 10/26/2012      Diagnosis updated by automated process. Provider to review and confirm.     Lower urinary tract symptoms (LUTS) 11/5/2016     See urology consult 10/16       Osteoarthritis, unspecified  osteoarthritis type, unspecified site 2016     PAST SURGICAL HISTORY  Past Surgical History   Procedure Laterality Date     Prostate surgery       TUNA for BPH     Orthopedic surgery       R elbow Orif     Hernia repair       Inguinal     Tonsillectomy       age 5     Joint replacement, hip rt/lt Right      Joint Replacement Hip RT/LT     Cystoscopy, transurethral resection (tur) prostate, combined N/A 3/8/2017     Procedure: COMBINED CYSTOSCOPY, TRANSURETHRAL RESECTION (TUR) PROSTATE;  Surgeon: Rosa Macias MD;  Location:  OR     FAMILY HISTORY  Family History   Problem Relation Age of Onset     HEART DISEASE Father       age 56; had chest pain,  in the hospital soon after; was a smoker     Genetic Disorder Brother      +HFE gene mutation, but does not have hemochromatosis, but has phlebotomies     Blood Disease Mother      hemolytic anemia;  age 85     SOCIAL HISTORY  Social History   Substance Use Topics     Smoking status: Never Smoker     Smokeless tobacco: Never Used     Alcohol use Yes     MEDICATIONS  Current Facility-Administered Medications   Medication     0.9% sodium chloride BOLUS     Current Outpatient Prescriptions   Medication     ciprofloxacin (CIPRO) 500 MG tablet     docusate sodium (COLACE) 100 MG tablet     oxyCODONE (ROXICODONE) 5 MG IR tablet     amoxicillin (AMOXIL) 500 MG tablet     tamsulosin (FLOMAX) 0.4 MG capsule     atorvastatin (LIPITOR) 40 MG tablet     DOXEPIN HCL PO     aspirin 81 MG tablet     diclofenac (VOLTAREN) 1 % GEL     busPIRone (BUSPAR) 10 MG tablet     ALLERGIES  Allergies   Allergen Reactions     Animal Dander      cats        Review of Systems   Constitutional: Negative for chills, diaphoresis and fever.   HENT: Negative for congestion.    Eyes: Negative for redness.   Respiratory: Negative for shortness of breath.    Cardiovascular: Negative for chest pain.   Gastrointestinal: Negative for abdominal pain.   Genitourinary:  Positive for dysuria and penile pain. Negative for difficulty urinating and frequency.   Musculoskeletal: Negative for arthralgias and neck stiffness.   Skin: Negative for color change.   Neurological: Positive for weakness (generalized). Negative for headaches.   Psychiatric/Behavioral: Negative for confusion.   All other systems reviewed and are negative.      Physical Exam   BP: 91/44  Pulse: 60  Temp: 97.4  F (36.3  C)  Resp: 16  SpO2: 100 %  Physical Exam   Constitutional: No distress.   HENT:   Head: Atraumatic.   Mouth/Throat: Oropharynx is clear and moist. No oropharyngeal exudate.   Eyes: Pupils are equal, round, and reactive to light. No scleral icterus.   Cardiovascular: Normal heart sounds and intact distal pulses.    Pulmonary/Chest: Breath sounds normal. No respiratory distress.   Abdominal: Soft. Bowel sounds are normal. There is no tenderness.   Musculoskeletal: He exhibits no edema or tenderness.   Skin: Skin is warm. No rash noted. He is not diaphoretic.       ED Course     ED Course     Procedures             Critical Care time:  was 55 minutes for this patient excluding procedures.             Results for orders placed or performed during the hospital encounter of 03/12/17   CBC with platelets differential   Result Value Ref Range    WBC 9.7 4.0 - 11.0 10e9/L    RBC Count 4.51 4.4 - 5.9 10e12/L    Hemoglobin 13.5 13.3 - 17.7 g/dL    Hematocrit 40.7 40.0 - 53.0 %    MCV 90 78 - 100 fl    MCH 29.9 26.5 - 33.0 pg    MCHC 33.2 31.5 - 36.5 g/dL    RDW 13.4 10.0 - 15.0 %    Platelet Count 100 (L) 150 - 450 10e9/L    Diff Method Automated Method     % Neutrophils 78.8 %    % Lymphocytes 10.3 %    % Monocytes 9.0 %    % Eosinophils 1.2 %    % Basophils 0.5 %    % Immature Granulocytes 0.2 %    Nucleated RBCs 0 0 /100    Absolute Neutrophil 7.6 1.6 - 8.3 10e9/L    Absolute Lymphocytes 1.0 0.8 - 5.3 10e9/L    Absolute Monocytes 0.9 0.0 - 1.3 10e9/L    Absolute Eosinophils 0.1 0.0 - 0.7 10e9/L    Absolute  Basophils 0.1 0.0 - 0.2 10e9/L    Abs Immature Granulocytes 0.0 0 - 0.4 10e9/L    Absolute Nucleated RBC 0.0    CRP inflammation   Result Value Ref Range    CRP Inflammation 160.0 (H) 0.0 - 8.0 mg/L   Comprehensive metabolic panel   Result Value Ref Range    Sodium 143 133 - 144 mmol/L    Potassium 3.9 3.4 - 5.3 mmol/L    Chloride 107 94 - 109 mmol/L    Carbon Dioxide 29 20 - 32 mmol/L    Anion Gap 7 3 - 14 mmol/L    Glucose 115 (H) 70 - 99 mg/dL    Urea Nitrogen 29 7 - 30 mg/dL    Creatinine 1.27 (H) 0.66 - 1.25 mg/dL    GFR Estimate 56 (L) >60 mL/min/1.7m2    GFR Estimate If Black 68 >60 mL/min/1.7m2    Calcium 8.2 (L) 8.5 - 10.1 mg/dL    Bilirubin Total 0.5 0.2 - 1.3 mg/dL    Albumin 2.8 (L) 3.4 - 5.0 g/dL    Protein Total 6.4 (L) 6.8 - 8.8 g/dL    Alkaline Phosphatase 98 40 - 150 U/L    ALT 20 0 - 70 U/L    AST 8 0 - 45 U/L   Lactic acid   Result Value Ref Range    Lactic Acid 1.1 0.7 - 2.1 mmol/L   UA with Microscopic reflex to Culture   Result Value Ref Range    Color Urine Light Red     Appearance Urine Cloudy     Glucose Urine Negative NEG mg/dL    Bilirubin Urine Negative NEG    Ketones Urine Negative NEG mg/dL    Specific Gravity Urine 1.017 1.003 - 1.035    Blood Urine Large (A) NEG    pH Urine 6.0 5.0 - 7.0 pH    Protein Albumin Urine 100 (A) NEG mg/dL    Urobilinogen mg/dL Normal 0.0 - 2.0 mg/dL    Nitrite Urine Negative NEG    Leukocyte Esterase Urine Large (A) NEG    Source Midstream Urine     WBC Urine >182 (H) 0 - 2 /HPF    RBC Urine >182 (H) 0 - 2 /HPF    WBC Clumps Present (A) NEG /HPF   Blood culture   Result Value Ref Range    Specimen Description Blood Unspecified Site     Culture Micro Pending     Micro Report Status Pending      Medications   0.9% sodium chloride BOLUS (not administered)   0.9% sodium chloride BOLUS (0 mLs Intravenous ED Infusing on Admission/transfer 3/12/17 1525)   cefTRIAXone (ROCEPHIN) 1 g vial to attach to  mL bag for ADULTS or NS 50 mL bag for PEDS (0 g  Intravenous Stopped 3/12/17 1525)       Labs Ordered and Resulted from Time of ED Arrival Up to the Time of Departure from the ED   CBC WITH PLATELETS DIFFERENTIAL - Abnormal; Notable for the following:        Result Value    Platelet Count 100 (*)     All other components within normal limits   CRP INFLAMMATION - Abnormal; Notable for the following:     CRP Inflammation 160.0 (*)     All other components within normal limits   COMPREHENSIVE METABOLIC PANEL - Abnormal; Notable for the following:     Glucose 115 (*)     Creatinine 1.27 (*)     GFR Estimate 56 (*)     Calcium 8.2 (*)     Albumin 2.8 (*)     Protein Total 6.4 (*)     All other components within normal limits   ROUTINE UA WITH MICROSCOPIC REFLEX TO CULTURE - Abnormal; Notable for the following:     Blood Urine Large (*)     Protein Albumin Urine 100 (*)     Leukocyte Esterase Urine Large (*)     WBC Urine >182 (*)     RBC Urine >182 (*)     WBC Clumps Present (*)     All other components within normal limits   LACTIC ACID WHOLE BLOOD   BLADDER SCAN   BLOOD CULTURE   BLOOD CULTURE   URINE CULTURE AEROBIC BACTERIAL       Assessments & Plan (with Medical Decision Making)   71-year-old male presents for further evaluation following a TURP procedure 4 days earlier.  He had been evaluated yesterday evening for fever and given ceftriaxone in the emergency room.  Blood culture obtained at that time was growing out gram-negative rods and therefore I contacted the patient at home and asked him to return.  Patient states that overall he feels improved.  His exam was unremarkable with exception of slightly low blood pressure.  Laboratories were obtained revealing a CBC that was normal with exception of slightly low platelet count.  CRP was elevated at 160 consistent with bacteremia.  Comprehensive metabolic panel revealed a slight increase in creatinine of 1.27 with a elevated glucose 1:15 consistent with stress response.  Calcium, albumen and total protein were  slightly low.  Repeat urine revealed white blood cell clumps, white blood cells and red blood cells.  Patient was placed on a monitor and IV fluids were begun.  He received another gram of ceftriaxone following observation of gram-negative rods representing E. coli in both blood and urine.  Case was discussed at length with urology and internal medicine gallbladder of movement is agreed to accept the patient is an admission for further evaluation and management.    I have reviewed the nursing notes.    I have reviewed the findings, diagnosis, plan and need for follow up with the patient.    New Prescriptions    No medications on file       Final diagnoses:   Bacteremia     IHerminio, am serving as a trained medical scribe to document services personally performed by Harjit Carballo MD, based on the provider's statements to me.      Harjit LEMUS MD, was physically present and have reviewed and verified the accuracy of this note documented by Herminio Patel.    3/12/2017   [facility], EMERGENCY DEPARTMENT     Zelalem Carballo MD  03/12/17 5835

## 2017-03-12 NOTE — ED NOTES
Pt arrived in triage c/o of having a fever earlier today of 102.0 F. Pt took tylenol around 1730 and currently presents afebrile. Pt feels weak and had TURP procedure done three days prior. /88, P 75. A/O.

## 2017-03-12 NOTE — DISCHARGE INSTRUCTIONS
Please make an appointment to follow up with Urology Clinic (phone: [phone]) in 2-3 days for further evaluation and recommendations.    Understanding Urinary Tract Infections (UTIs)  Most UTIs are caused by bacteria, although they may also be caused by viruses or fungi. Bacteria from the bowel are the most common source of infection. The infection may begin because of any of the following:    Sexual activity. During sex, germs can travel from the penis, vagina, or rectum into the urethra.     Germs on the skin outside the rectum may travel into the urethra. This is more common in women since the rectum and urethra are closer to each other than in men. Wiping from front to back after using the toilet and keeping the area clean can help prevent germs from getting to the urethra.    Blockage of urine flow through the urinary tract. If urine sits too long, germs may begin to grow out of control.      Parts of the urinary tract  The infection can occur in any part of the urinary tract.    The kidneys collect and store urine.    The ureters carry urine from the kidneys to the bladder.    The bladder holds urine until you are ready to let it out.    The urethra carries urine from the bladder out of the body. It is shorter in women, so bacteria can move through it more easily. The urethra is longer in men, so a UTI is less likely to reach the bladder or kidneys in men.    5902-6621 [employer]. [address] [place] [address]. All rights reserved. This information is not intended as a substitute for professional medical care. Always follow your healthcare professional's instructions.

## 2017-03-12 NOTE — PHARMACY-ADMISSION MEDICATION HISTORY
Admission medication history interview status for the 3/12/2017 admission is complete. See Epic admission navigator for allergy information, pharmacy, prior to admission medications and immunization status.     Medication history interview sources:  Patient (primary), Brayan ([phone])    Changes made to PTA medication list (reason)  Added: acetaminophen (per pt)  Deleted: N/A (per pt)  Changed:   -amoxicillin: removed- Take 500mg by mouth. (per pt, was prescribed 2000mg 1 hour prior to dental work; has never taken a dose.)  -doxepin: Take 1.25mL by mouth at bedtime. --> Take 1.25mL *(12.5mg/1.25mL, confirmed by Brayan)* by mouth at bedtime. (per pt)      Additional medication history information (including reliability of information, actions taken by pharmacist):  -Pt was able to confirm medication strengths and recall dosig regimens, last doses taken.  -Oxycodone: Pt reported only a single dose taken of this medication in the hospital this past Wednesday; no doses taken at home.   -Docusate sodium: Pt reported being prescribed this medication in conjunction with oxycodone. Pt has not taken a dose of docusate.  -Pt has influenza immunization documentation for this season in [facility].       Prior to Admission medications    Medication Sig Last Dose Taking? Auth Provider   ACETAMINOPHEN PO Take 650 mg by mouth every 4 hours as needed for pain 3/12/2017 at AM Yes Unknown, Entered By History   ciprofloxacin (CIPRO) 500 MG tablet Take 1 tablet (500 mg) by mouth every 12 hours for 14 days 3/11/2017 at PM Yes Joseline Devine MD   tamsulosin (FLOMAX) 0.4 MG capsule Take 1 capsule (0.4 mg) by mouth daily  Patient taking differently: Take 0.4 mg by mouth every morning  3/12/2017 at AM Yes Guillermo Robb DO   atorvastatin (LIPITOR) 40 MG tablet Take 1 tablet (40 mg) by mouth daily  Patient taking differently: Take 40 mg by mouth every morning  3/12/2017 at Unknown time Yes Dariel Jack MD   DOXEPIN HCL PO Take  by mouth At Bedtime Patient states that he takes 1.25 mL HS (12.5mg/1.25mL). 3/11/2017 at Unknown time Yes Reported, Patient   diclofenac (VOLTAREN) 1 % GEL Place onto the skin as needed for moderate pain Patient uses prn on hands for playing guitar. 3/11/2017 at Unknown time Yes Dariel Jack MD   busPIRone (BUSPAR) 10 MG tablet Take by mouth 3 times daily 20 mg every morning, 10 mg every afternoon, 20 mg HS 3/12/2017 at Unknown time Yes Dariel Jack MD   docusate sodium (COLACE) 100 MG tablet Take 100 mg by mouth 2 times daily never taken  Leatha Moeller MD   oxyCODONE (ROXICODONE) 5 MG IR tablet Take 1-2 tablets (5-10 mg) by mouth every 3 hours as needed for pain or other (Moderate to Severe) 3/8/2017  Rosa Macias MD   amoxicillin (AMOXIL) 500 MG tablet Take 2000 mg 60 min prior to dental work  never taken  Reported, Patient   aspirin 81 MG tablet Take 81 mg by mouth every morning  3/2/2017  Reported, Patient         Medication history completed by: Claudia Yee

## 2017-03-12 NOTE — IP AVS SNAPSHOT
[address] [address]    Phone:  [phone]                                       After Visit Summary   3/12/2017    Chester Palacios    MRN: [id number]           After Visit Summary Signature Page     I have received my discharge instructions, and my questions have been answered. I have discussed any challenges I see with this plan with the nurse or doctor.    ..........................................................................................................................................  Patient/Patient Representative Signature      ..........................................................................................................................................  Patient Representative Print Name and Relationship to Patient    ..................................................               ................................................  Date                                            Time    ..........................................................................................................................................  Reviewed by Signature/Title    ...................................................              ..............................................  Date                                                            Time

## 2017-03-12 NOTE — ED PROVIDER NOTES
History     Chief Complaint   Patient presents with     Fever     Generalized Weakness     HPI  Chester Palacios is a 71 year old male who is status post transurethral prostate resection on 3/8/17 who presents for evaluation of fever and generalized weakness. Patient reports he went home on Thursday, two days ago, and was feeling at his baseline until this afternoon. He complains of decreased urine output, increased urinary frequency, dysuria, and intermittent, sharp, non-radiating, mild penile pain while urinating that began this afternoon. Nothing makes the symptoms better or worse. He also complains of a fever up to 102.2F at home for which he took Tylenol around 5:30 PM. He denies nausea, vomiting, diarrhea, or pain of any kind at this time.     I have reviewed the Medications, Allergies, Past Medical and Surgical History, and Social History in the [employer] system.  Past Medical History   Diagnosis Date     Anxiety state 10/26/2012     Works with psychiatrist      Arthritis of right hip 12/21/2015     Coronary artery calcification seen on computed tomography 12/12/2016     See Cardiology consult 1/17; cardiac MRI shows EF 60%, no scar       Family history of hemochromatosis 10/25/2015     Brother; Chester had nml labs in 2010      Hyperlipidemia with target LDL less than 130 10/26/2012      Diagnosis updated by automated process. Provider to review and confirm.     Lower urinary tract symptoms (LUTS) 11/5/2016     See urology consult 10/16       Osteoarthritis, unspecified osteoarthritis type, unspecified site 11/7/2016       Past Surgical History   Procedure Laterality Date     Prostate surgery  2007     TUNA for BPH     Orthopedic surgery       R elbow Orif     Hernia repair  1985     Inguinal     Tonsillectomy       age 5     Joint replacement, hip rt/lt Right 1/16     Joint Replacement Hip RT/LT     Cystoscopy, transurethral resection (tur) prostate, combined N/A 3/8/2017     Procedure: COMBINED CYSTOSCOPY,  TRANSURETHRAL RESECTION (TUR) PROSTATE;  Surgeon: Rosa Macias MD;  Location:  OR       Family History   Problem Relation Age of Onset     HEART DISEASE Father       age 56; had chest pain,  in the hospital soon after; was a smoker     Genetic Disorder Brother      +HFE gene mutation, but does not have hemochromatosis, but has phlebotomies     Blood Disease Mother      hemolytic anemia;  age 85       Social History   Substance Use Topics     Smoking status: Never Smoker     Smokeless tobacco: Never Used     Alcohol use Yes      Comment: 2-3 drinks weekly     No current facility-administered medications for this encounter.      Current Outpatient Prescriptions   Medication     ciprofloxacin (CIPRO) 500 MG tablet     docusate sodium (COLACE) 100 MG tablet     tamsulosin (FLOMAX) 0.4 MG capsule     atorvastatin (LIPITOR) 40 MG tablet     DOXEPIN HCL PO     busPIRone (BUSPAR) 10 MG tablet     oxyCODONE (ROXICODONE) 5 MG IR tablet     amoxicillin (AMOXIL) 500 MG tablet     aspirin 81 MG tablet     diclofenac (VOLTAREN) 1 % GEL        Allergies   Allergen Reactions     Animal Dander      cats       Review of Systems   Constitutional: Positive for fever (102.2F at home ). Negative for chills and fatigue.   HENT: Negative for congestion.    Eyes: Negative for photophobia and visual disturbance.   Respiratory: Negative for cough.    Cardiovascular: Negative for chest pain.   Gastrointestinal: Negative for abdominal pain, diarrhea, nausea and vomiting.   Endocrine: Negative for polydipsia and polyuria.   Genitourinary: Positive for difficulty urinating (decreased urine output), frequency (increased) and penile pain (w/urination).   Musculoskeletal: Negative for back pain, neck pain and neck stiffness.   Skin: Negative for color change and rash.   Allergic/Immunologic: Negative for immunocompromised state.   Neurological: Negative for light-headedness.   Hematological: Negative for adenopathy. Does  "not bruise/bleed easily.   Psychiatric/Behavioral: Negative for agitation and behavioral problems.       Physical Exam   BP: 174/88  Pulse: 74  Temp: 97.8  F (36.6  C)  Resp: 18  Height: 177.8 cm (5' 10\")  Weight: 72.6 kg (160 lb)  SpO2: 95 %  Physical Exam   Constitutional: He is oriented to person, place, and time. He appears well-developed and well-nourished. No distress.   HENT:   Head: Normocephalic and atraumatic.   Right Ear: External ear normal.   Left Ear: External ear normal.   Nose: Nose normal.   Mouth/Throat: Oropharynx is clear and moist. No oropharyngeal exudate.   Eyes: Conjunctivae and EOM are normal. Pupils are equal, round, and reactive to light. No scleral icterus.   Neck: Normal range of motion. Neck supple.   Cardiovascular: Normal rate, normal heart sounds and intact distal pulses.    Pulmonary/Chest: Effort normal and breath sounds normal. No respiratory distress. He has no wheezes. He has no rales.   Abdominal: Soft. Bowel sounds are normal. There is no tenderness. Hernia confirmed negative in the right inguinal area and confirmed negative in the left inguinal area.   Genitourinary: Penis normal. Right testis shows no mass, no swelling and no tenderness. Left testis shows no mass, no swelling and no tenderness. Circumcised. No penile erythema or penile tenderness. No discharge found.   Musculoskeletal: Normal range of motion. He exhibits no edema or tenderness.   Lymphadenopathy:        Right: No inguinal adenopathy present.        Left: No inguinal adenopathy present.   Neurological: He is alert and oriented to person, place, and time. No cranial nerve deficit. He exhibits normal muscle tone. Coordination normal.   Skin: Skin is warm. No rash noted. He is not diaphoretic.   Psychiatric: He has a normal mood and affect. His behavior is normal. Judgment and thought content normal.   Nursing note and vitals reviewed.      ED Course   7:44 PM  The patient was seen and examined by Dr. Devine in " Room 19.     ED Course     Procedures             Critical Care time:  none               Labs Ordered and Resulted from Time of ED Arrival Up to the Time of Departure from the ED   CBC WITH PLATELETS DIFFERENTIAL - Abnormal; Notable for the following:        Result Value    Platelet Count 106 (*)     Nucleated RBCs 1 (*)     Absolute Lymphocytes 0.3 (*)     All other components within normal limits   COMPREHENSIVE METABOLIC PANEL - Abnormal; Notable for the following:     Glucose 118 (*)     All other components within normal limits   ROUTINE UA WITH MICROSCOPIC - Abnormal; Notable for the following:     Blood Urine Large (*)     Protein Albumin Urine 100 (*)     Nitrite Urine Positive (*)     Leukocyte Esterase Urine Large (*)     WBC Urine >182 (*)     RBC Urine >182 (*)     WBC Clumps Present (*)     Mucous Urine Present (*)     All other components within normal limits   LIPASE   PERIPHERAL IV CATHETER   CARDIAC CONTINUOUS MONITORING   PULSE OXIMETRY NURSING   BLADDER SCAN   URINE CULTURE AEROBIC BACTERIAL   BLOOD CULTURE   BLOOD CULTURE       Assessments & Plan (with Medical Decision Making)   71-year-old male presenting with dysuria and increased frequency status post TURP three days ago.  Differential diagnosis: cystitis, pyelonephritis, bacteremia, sepsis.    After thorough history and physical exam patient appears to be in no acute distress. I ll obtain laboratory studies for further evaluation. Patient is afebrile, however, he did take Tylenol prior to arrival. I ll touch base with Urology once the results return. Patient and his wife agree with the plan.     Patient s laboratory studies returned with no leukocytosis WBC is normal at 6700. There is no anemia hemoglobin normal at 15.1. Electrolytes show no evidence of dehydration creatinine is normal at 0.98. LFTs and lipase are normal. Urinalysis shows evidence of infection with positive nitrates, large leukocyte esterase and a significant amount of WBCs  and RBCs in the urine. Patient was treated with intravenous ceftriaxone in the emergency department and oral ciprofloxacin. Post-void residual showed 125 cc of urine in the bladder. I discussed the case with Urology resident and the plan was made for the patient to be discharged given the fact that he is hemodynamically stable, does not appear to be septic, and has a follow-up with Urology clinic in the recent future. He will be discharged with a prescription for ciprofloxacin. He agrees to return if his symptoms worsen. His wife agrees with this plan as well.     I have reviewed the nursing notes.    I have reviewed the findings, diagnosis, plan and need for follow up with the patient.    New Prescriptions    CIPROFLOXACIN (CIPRO) 500 MG TABLET    Take 1 tablet (500 mg) by mouth every 12 hours for 14 days       Final diagnoses:   Acute cystitis with hematuria   IChio, am serving as a trained medical scribe to document services personally performed by Joseline Devine MD, based on the provider's statements to me.   IJoseline MD, was physically present and have reviewed and verified the accuracy of this note documented by Chio Devi.        3/11/2017   [facility], [place], EMERGENCY DEPARTMENT     Joseline Devine MD  03/11/17 5767

## 2017-03-12 NOTE — ED NOTES
11:29 AM  I was contacted by the laboratory microbiology division regarding positive blood culture growing gram-negative rods.  The patient was reached at home and informed of the results with my recommendations that he return for further evaluation and probable admission.     Zelalem Carballo MD  03/12/17 7721

## 2017-03-12 NOTE — ED NOTES
Pt arrived in the ED with reports of abnormal urine culture, called to come to ED.  Pt is awake, alert, and oriented x4; pt is ambulatory with a steady gait.

## 2017-03-13 ENCOUNTER — PRE VISIT (OUTPATIENT)
Dept: UROLOGY | Facility: CLINIC | Age: 72
End: 2017-03-13

## 2017-03-13 LAB
ANION GAP SERPL CALCULATED.3IONS-SCNC: 8 MMOL/L (ref 3–14)
BACTERIA SPEC CULT: ABNORMAL
BACTERIA SPEC CULT: NORMAL
BUN SERPL-MCNC: 18 MG/DL (ref 7–30)
CALCIUM SERPL-MCNC: 8.3 MG/DL (ref 8.5–10.1)
CHLORIDE SERPL-SCNC: 113 MMOL/L (ref 94–109)
CO2 SERPL-SCNC: 24 MMOL/L (ref 20–32)
CREAT SERPL-MCNC: 0.92 MG/DL (ref 0.66–1.25)
ERYTHROCYTE [DISTWIDTH] IN BLOOD BY AUTOMATED COUNT: 13.5 % (ref 10–15)
GFR SERPL CREATININE-BSD FRML MDRD: 81 ML/MIN/1.7M2
GLUCOSE SERPL-MCNC: 99 MG/DL (ref 70–99)
HCT VFR BLD AUTO: 39.7 % (ref 40–53)
HGB BLD-MCNC: 13.2 G/DL (ref 13.3–17.7)
Lab: ABNORMAL
Lab: NORMAL
MCH RBC QN AUTO: 29.9 PG (ref 26.5–33)
MCHC RBC AUTO-ENTMCNC: 33.2 G/DL (ref 31.5–36.5)
MCV RBC AUTO: 90 FL (ref 78–100)
MICRO REPORT STATUS: ABNORMAL
MICRO REPORT STATUS: NORMAL
MICROORGANISM SPEC CULT: ABNORMAL
PLATELET # BLD AUTO: 100 10E9/L (ref 150–450)
POTASSIUM SERPL-SCNC: 4 MMOL/L (ref 3.4–5.3)
RBC # BLD AUTO: 4.41 10E12/L (ref 4.4–5.9)
SODIUM SERPL-SCNC: 145 MMOL/L (ref 133–144)
SPECIMEN SOURCE: ABNORMAL
SPECIMEN SOURCE: NORMAL
WBC # BLD AUTO: 5.8 10E9/L (ref 4–11)

## 2017-03-13 PROCEDURE — 80048 BASIC METABOLIC PNL TOTAL CA: CPT | Performed by: STUDENT IN AN ORGANIZED HEALTH CARE EDUCATION/TRAINING PROGRAM

## 2017-03-13 PROCEDURE — 25000132 ZZH RX MED GY IP 250 OP 250 PS 637: Mod: GY | Performed by: STUDENT IN AN ORGANIZED HEALTH CARE EDUCATION/TRAINING PROGRAM

## 2017-03-13 PROCEDURE — 87040 BLOOD CULTURE FOR BACTERIA: CPT | Performed by: STUDENT IN AN ORGANIZED HEALTH CARE EDUCATION/TRAINING PROGRAM

## 2017-03-13 PROCEDURE — 85027 COMPLETE CBC AUTOMATED: CPT | Performed by: STUDENT IN AN ORGANIZED HEALTH CARE EDUCATION/TRAINING PROGRAM

## 2017-03-13 PROCEDURE — A9270 NON-COVERED ITEM OR SERVICE: HCPCS | Mod: GY | Performed by: STUDENT IN AN ORGANIZED HEALTH CARE EDUCATION/TRAINING PROGRAM

## 2017-03-13 PROCEDURE — 36415 COLL VENOUS BLD VENIPUNCTURE: CPT | Performed by: STUDENT IN AN ORGANIZED HEALTH CARE EDUCATION/TRAINING PROGRAM

## 2017-03-13 PROCEDURE — 99233 SBSQ HOSP IP/OBS HIGH 50: CPT | Mod: GC | Performed by: INTERNAL MEDICINE

## 2017-03-13 PROCEDURE — 25000128 H RX IP 250 OP 636: Performed by: STUDENT IN AN ORGANIZED HEALTH CARE EDUCATION/TRAINING PROGRAM

## 2017-03-13 PROCEDURE — 12000008 ZZH R&B INTERMEDIATE UMMC

## 2017-03-13 RX ADMIN — HEPARIN SODIUM 5000 UNITS: 5000 INJECTION, SOLUTION INTRAVENOUS; SUBCUTANEOUS at 21:01

## 2017-03-13 RX ADMIN — ATORVASTATIN CALCIUM 40 MG: 40 TABLET, FILM COATED ORAL at 09:56

## 2017-03-13 RX ADMIN — DOXEPIN HYDROCHLORIDE 12.5 MG: 10 SOLUTION ORAL at 21:01

## 2017-03-13 RX ADMIN — ASPIRIN 81 MG CHEWABLE TABLET 81 MG: 81 TABLET CHEWABLE at 09:56

## 2017-03-13 RX ADMIN — ACETAMINOPHEN 650 MG: 325 TABLET, FILM COATED ORAL at 03:16

## 2017-03-13 RX ADMIN — TAMSULOSIN HYDROCHLORIDE 0.4 MG: 0.4 CAPSULE ORAL at 09:56

## 2017-03-13 RX ADMIN — BUSPIRONE HYDROCHLORIDE 20 MG: 10 TABLET ORAL at 09:56

## 2017-03-13 RX ADMIN — CEFTRIAXONE SODIUM 1 G: 1 INJECTION, POWDER, FOR SOLUTION INTRAMUSCULAR; INTRAVENOUS at 14:26

## 2017-03-13 RX ADMIN — BUSPIRONE HYDROCHLORIDE 20 MG: 10 TABLET ORAL at 21:00

## 2017-03-13 RX ADMIN — BUSPIRONE HYDROCHLORIDE 10 MG: 10 TABLET ORAL at 14:28

## 2017-03-13 RX ADMIN — HEPARIN SODIUM 5000 UNITS: 5000 INJECTION, SOLUTION INTRAVENOUS; SUBCUTANEOUS at 09:55

## 2017-03-13 RX ADMIN — SODIUM CHLORIDE: 9 INJECTION, SOLUTION INTRAVENOUS at 03:19

## 2017-03-13 ASSESSMENT — PAIN DESCRIPTION - DESCRIPTORS: DESCRIPTORS: HEADACHE

## 2017-03-13 NOTE — PROGRESS NOTES
Medicine Daily Progress Note   2017    Chester Palacios MRN# [id number]   Age: 71 year old  Sex: male YOB: 1945  Date of Admission: 3/12/17          Assessment and Plan:     72 y/o male with PMH significant for anxiety, HLD, and BPH s/p TURP on 3/8/17 now presenting with E.coli UTI with subsequent E.coli bacteremia    # E.coli Bacteremia / UTI  - Urine culture and blood cultures (3/11/17) growing pan-sensitive E.coli  - Urine culture and blood cultures taken on 3/12/17, additional cultures taken today (3/13/17)  - Continue ceftriaxone 1g q24h, with plan to switch to PO amoxicillin upon discharge for total of 14 day course.   - Continue trending CBC.  - Appreciate urology consult and recs. Patient is proceeding on normal post-operative course.      # Anxiety  - Continue home buspirone and doxepin     # HLD  - Continue home atorvastatin and aspirin     Summary  FEN: Regular adult diet.   Prophylaxis  - Lung: Incentive spirometer  - DVT: SubQ heparin  - GI: None  Code Status: FULL     Disposition:     Patient was seen and discussed with Dr. Salvador ROWELL.      Dhiraj Daniels, LUIZS  Oral & Maxillofacial Surgery Resident  PGY-1  [phone]     Subjective:     Patient states he did well overnight, although he did not sleep soundly, having ~8 episodes of urination last night. He does endorse a decrease in penile pain during urination. He denies fatigue, altered mental status, shortness of breath, muscle aches, or difficulty swallowing.      Physical Exam:   Vitals:  Temp:  [96.9  F (36.1  C)-98.1  F (36.7  C)] 98  F (36.7  C)  Pulse:  [52-60] 56  Resp:  [16] 16  BP: ()/(44-84) 147/60  SpO2:  [94 %-100 %] 96 %    Temp  Av.1  F (36.7  C)  Min: 96.9  F (36.1  C)  Max: 101.4  F (38.6  C)      Pulse  Av.9  Min: 52  Max: 74 Resp  Avg: 15.7  Min: 12  Max: 18  SpO2  Av.3 %  Min: 93 %  Max: 100 %         Wt Readings from Last 4 Encounters:   17 73.8 kg (162 lb 9.6 oz)   17 72.6 kg  (160 lb)   03/08/17 74.1 kg (163 lb 5.8 oz)   02/22/17 75.3 kg (166 lb 1.6 oz)       Intake/Output Summary (Last 24 hours) at 03/13/17 1138  Last data filed at 03/13/17 0700   Gross per 24 hour   Intake             3375 ml   Output             2325 ml   Net             1050 ml     General: Appears well, sitting in bed eating breakfast, in no acute distress.  Heme/Lymph: No overt bleeding. No palpable cervical or clavicular adenopathy.  Skin: No concerning rash, jaundice or ecchymoses   HEENT: No subconjunctival hemorrhage noted bilaterally. Anicteric sclera.   Respiratory: Non-labored breathing, lungs clear to auscultation bilaterally.  Cardiovascular: Regular rate and rhythm, normal S1 and S2   Gastrointestinal: Normoactive bowel sounds. Abdomen soft, non-distended, and non-tender.    Extremities: No extremity edema  Neurologic: A&O x 3, speech normal         Laboratory Data:   ROUTINE ICU LABS (Last four results)  CMP  Recent Labs  Lab 03/13/17  0735 03/12/17  1418 03/11/17 2004 03/09/17  0737   * 143 143 138   POTASSIUM 4.0 3.9 3.5 3.9   CHLORIDE 113* 107 104 106   CO2 24 29 28 25   ANIONGAP 8 7 11 7   GLC 99 115* 118* 108*   BUN 18 29 20 14   CR 0.92 1.27* 0.98 0.92   GFRESTIMATED 81 56* 76 80   GFRESTBLACK >90African American GFR Calc 68 >90African American GFR Calc >90African American GFR Calc   MARIANO 8.3* 8.2* 9.0 8.6   PROTTOTAL  --  6.4* 7.4  --    ALBUMIN  --  2.8* 3.4  --    BILITOTAL  --  0.5 0.7  --    ALKPHOS  --  98 114  --    AST  --  8 13  --    ALT  --  20 21  --      CBC  Recent Labs  Lab 03/13/17  0735 03/12/17  1741 03/12/17  1418 03/11/17 2004   WBC 5.8 8.2 9.7 6.7   RBC 4.41 4.16* 4.51 4.98   HGB 13.2* 12.4* 13.5 15.1   HCT 39.7* 37.7* 40.7 44.3   MCV 90 91 90 89   MCH 29.9 29.8 29.9 30.3   MCHC 33.2 32.9 33.2 34.1   RDW 13.5 13.4 13.4 13.1   * 87* 100* 106*

## 2017-03-13 NOTE — TELEPHONE ENCOUNTER
Patient is coming in to see  for  pathology review and symptom review, no need for a called very thing is epic ready for appointment.

## 2017-03-13 NOTE — PROGRESS NOTES
Urology Daily Progress Note    24 hour events/Subjective:     - No acute events overnight. Reporting urinary frequency overnight   - Pain well controlled on current regimen   - Tolerating regular diet ; no nausea or vomiting    O:  Vitals: Afebrile, VSS  General: Alert, interactive, in NAD  Resp: Non-labored breathing on RA  Abdomen: Soft, non-tender, non distended.   Ext: Warm and well perfused      I/O last 3 completed shifts:  In: 3135 [P.O.:540; I.V.:2595]  Out: 1200 [Urine:1200] - Previous 24 hrs    I/O this shift:  In: 240 [P.O.:240]  Out: 275 [Urine:275] -Since midnight    Labs/Imaging  Heme:  Recent Labs  Lab 03/12/17  1741 03/12/17  1418 03/11/17 2004 03/09/17  0737   WBC 8.2 9.7 6.7 8.0   HGB 12.4* 13.5 15.1 13.9   PLT 87* 100* 106* 147*     Chem:  Recent Labs  Lab 03/12/17  1418 03/11/17 2004 03/09/17  0737   POTASSIUM 3.9 3.5 3.9   CR 1.27* 0.98 0.92       Assessment/Plan  71 year old y/o male admitted one day ago for post-TURP bacteremia with E. Coli, being treated with ceftriaxone.    - Appreciate cares per medicine service  - Okay to narrow to PO antibiotics based on sensitivities from urologic perspective  - Okay to try pyridium 100 mg TID x 3 days for dysuria  - Urology will continue to follow while in house    Seen and examined with chief resident, to be discussed with Dr. Jefferson    --    Josias Delgado MD  Urology Resident           Contacting the Urology Team     Please use the following job codes to reach the Urology Team. Note that you must use an in house phone and that job codes cannot receive text pages.     On weekdays, dial [phone] (or star-star-star 777 on the new [employer] telephones) then [phone] to reach the Adult Urology resident or PA on call    On weekdays, dial [phone] (or star-star-star 777 on the new [employer] telephones) then 0818 to reach the Pediatric Urology resident    On weeknights and weekends, dial [phone] (or star-star-star 777 on the new [employer] telephones) then [phone] to reach the Urology  resident on call (for both Adult and Pediatrics)

## 2017-03-13 NOTE — PLAN OF CARE
Problem: Goal Outcome Summary  Goal: Goal Outcome Summary  VSS, per pt headache, received tylenol and able to get some rest afterwards. Voiding in urinal, per pt burning with start of stream. One bm overnight. Up independently. PIV infusing at 100cc/hr. Continue with POC.

## 2017-03-13 NOTE — PLAN OF CARE
Problem: Individualization  Goal: Patient Preferences  VSS and pt afebrile.  Denies pain and nausea.    MIVF stopped.  Pt is adequately drinking and eating.  He is not saving his urine at this time.  Adequate UOP and one bowel movement this shift.   Plan is for pt to discharge tomorrow on oral antibiotics if he remains afebrile and blood cultures remain negative. Pt does have an antibiotic at home that he would like to ask the doctors if it will cover the infection that they found so that he does not have to purchase another one. He has the paperwork for that medication in his [facility] folder in the room.

## 2017-03-13 NOTE — PLAN OF CARE
Problem: Goal Outcome Summary  Goal: Goal Outcome Summary  Outcome: Improving  Arrived from ER with wife. Alert and oriented. Denies pain. Voiding frequently 150-200cc. Post void bladder scans 104-215cc. Appetite good. Continues with IV fluids. Able to make needs known.

## 2017-03-14 VITALS
TEMPERATURE: 96.7 F | HEART RATE: 64 BPM | RESPIRATION RATE: 16 BRPM | DIASTOLIC BLOOD PRESSURE: 44 MMHG | SYSTOLIC BLOOD PRESSURE: 109 MMHG | OXYGEN SATURATION: 97 % | BODY MASS INDEX: 23.33 KG/M2 | WEIGHT: 162.6 LBS

## 2017-03-14 LAB
ANION GAP SERPL CALCULATED.3IONS-SCNC: 10 MMOL/L (ref 3–14)
BACTERIA SPEC CULT: ABNORMAL
BACTERIA SPEC CULT: ABNORMAL
BUN SERPL-MCNC: 14 MG/DL (ref 7–30)
CALCIUM SERPL-MCNC: 8.9 MG/DL (ref 8.5–10.1)
CHLORIDE SERPL-SCNC: 111 MMOL/L (ref 94–109)
CO2 SERPL-SCNC: 24 MMOL/L (ref 20–32)
CREAT SERPL-MCNC: 0.87 MG/DL (ref 0.66–1.25)
ERYTHROCYTE [DISTWIDTH] IN BLOOD BY AUTOMATED COUNT: 13.5 % (ref 10–15)
GFR SERPL CREATININE-BSD FRML MDRD: 86 ML/MIN/1.7M2
GLUCOSE SERPL-MCNC: 95 MG/DL (ref 70–99)
HCT VFR BLD AUTO: 42.3 % (ref 40–53)
HGB BLD-MCNC: 14 G/DL (ref 13.3–17.7)
MCH RBC QN AUTO: 29.9 PG (ref 26.5–33)
MCHC RBC AUTO-ENTMCNC: 33.1 G/DL (ref 31.5–36.5)
MCV RBC AUTO: 90 FL (ref 78–100)
MICRO REPORT STATUS: ABNORMAL
MICRO REPORT STATUS: ABNORMAL
MICROORGANISM SPEC CULT: ABNORMAL
PLATELET # BLD AUTO: 119 10E9/L (ref 150–450)
POTASSIUM SERPL-SCNC: 3.9 MMOL/L (ref 3.4–5.3)
RBC # BLD AUTO: 4.68 10E12/L (ref 4.4–5.9)
SODIUM SERPL-SCNC: 145 MMOL/L (ref 133–144)
SPECIMEN SOURCE: ABNORMAL
SPECIMEN SOURCE: ABNORMAL
WBC # BLD AUTO: 5.3 10E9/L (ref 4–11)

## 2017-03-14 PROCEDURE — 80048 BASIC METABOLIC PNL TOTAL CA: CPT | Performed by: INTERNAL MEDICINE

## 2017-03-14 PROCEDURE — 25000132 ZZH RX MED GY IP 250 OP 250 PS 637: Mod: GY | Performed by: STUDENT IN AN ORGANIZED HEALTH CARE EDUCATION/TRAINING PROGRAM

## 2017-03-14 PROCEDURE — 87040 BLOOD CULTURE FOR BACTERIA: CPT | Performed by: INTERNAL MEDICINE

## 2017-03-14 PROCEDURE — A9270 NON-COVERED ITEM OR SERVICE: HCPCS | Mod: GY | Performed by: STUDENT IN AN ORGANIZED HEALTH CARE EDUCATION/TRAINING PROGRAM

## 2017-03-14 PROCEDURE — 36415 COLL VENOUS BLD VENIPUNCTURE: CPT | Performed by: INTERNAL MEDICINE

## 2017-03-14 PROCEDURE — 85027 COMPLETE CBC AUTOMATED: CPT | Performed by: INTERNAL MEDICINE

## 2017-03-14 PROCEDURE — 99238 HOSP IP/OBS DSCHRG MGMT 30/<: CPT | Mod: GC | Performed by: INTERNAL MEDICINE

## 2017-03-14 PROCEDURE — 25000128 H RX IP 250 OP 636: Performed by: STUDENT IN AN ORGANIZED HEALTH CARE EDUCATION/TRAINING PROGRAM

## 2017-03-14 RX ORDER — AMOXICILLIN 500 MG/1
500 CAPSULE ORAL EVERY 8 HOURS
Qty: 33 CAPSULE | Refills: 0 | Status: SHIPPED
Start: 2017-03-14 | End: 2017-03-17

## 2017-03-14 RX ORDER — AMOXICILLIN 500 MG/1
500 CAPSULE ORAL EVERY 8 HOURS SCHEDULED
Status: DISCONTINUED | OUTPATIENT
Start: 2017-03-14 | End: 2017-03-14 | Stop reason: HOSPADM

## 2017-03-14 RX ADMIN — AMOXICILLIN 500 MG: 500 CAPSULE ORAL at 13:38

## 2017-03-14 RX ADMIN — HEPARIN SODIUM 5000 UNITS: 5000 INJECTION, SOLUTION INTRAVENOUS; SUBCUTANEOUS at 08:49

## 2017-03-14 RX ADMIN — BUSPIRONE HYDROCHLORIDE 20 MG: 10 TABLET ORAL at 08:50

## 2017-03-14 RX ADMIN — TAMSULOSIN HYDROCHLORIDE 0.4 MG: 0.4 CAPSULE ORAL at 08:49

## 2017-03-14 RX ADMIN — BUSPIRONE HYDROCHLORIDE 10 MG: 10 TABLET ORAL at 13:38

## 2017-03-14 RX ADMIN — ASPIRIN 81 MG CHEWABLE TABLET 81 MG: 81 TABLET CHEWABLE at 08:49

## 2017-03-14 RX ADMIN — ATORVASTATIN CALCIUM 40 MG: 40 TABLET, FILM COATED ORAL at 08:49

## 2017-03-14 NOTE — PLAN OF CARE
Problem: Goal Outcome Summary  Goal: Goal Outcome Summary  Outcome: Adequate for Discharge Date Met:  03/14/17  Pt discharged to home, discharge orders reviewed with patient questions answered as appropriate . Pt transported by wife.

## 2017-03-14 NOTE — PLAN OF CARE
Problem: Goal Outcome Summary  Goal: Goal Outcome Summary  Outcome: Improving  /62 (BP Location: Right arm)  Pulse 58  Temp 96.6  F (35.9  C) (Oral)  Resp 16  Wt 73.8 kg (162 lb 9.6 oz)  SpO2 98%  BMI 23.33 kg/m2   Afebrile, denies pain, good oral intake, up ad lee, voiding minimal output, bladder scanned 115cc, +BS. Passing flatus, spouse present at the bedside.

## 2017-03-14 NOTE — PROGRESS NOTES
Progress:     Called patient after discharge as there was concern regarding the original discharge antibiotic plan. Discussed risks/benefits of fluoroquinolones in setting of recent bacteremia. Ultimately, will finish a total of 14 days from first day of blood cultures being negative (3/12). He will take 500 mg ciprofloxacin po q 12, end date 3/25. Had cipro prescription medication at home, about 18 tabs so ordered enough to complete regimen duration. Discussed with patient and wife and sent [facility] message on their request regarding the benefits/risks of cipro.    Laine Bradley MD   Pager: [phone]

## 2017-03-14 NOTE — PLAN OF CARE
Problem: Goal Outcome Summary  Goal: Goal Outcome Summary  Outcome: No Change  /60 (BP Location: Left arm)  Pulse 50  Temp 95.5  F (35.3  C) (Oral)  Resp 16  Wt 73.8 kg (162 lb 9.6 oz)  SpO2 98%  BMI 23.33 kg/m2   Afebrile, denies pain, slept between cares, +BS, up ad lee, voided without saving, anticipating d/c today. Cont with poc

## 2017-03-14 NOTE — PROGRESS NOTES
Urology Daily Progress Note    24 hour events/Subjective:     - No acute events overnight   - Pain well controlled on current regimen   - Tolerating regular diet ; no nausea or vomiting    O:  Vitals: Afebrile, VSS  General: Alert, interactive, in NAD  Resp: Non-labored breathing on RA  Abdomen: Soft, non-tender, non distended.   Ext: Warm and well perfused    UOP 1425/    Labs/Imaging  Heme:    Recent Labs  Lab 03/13/17  0735 03/12/17  1741 03/12/17  1418 03/11/17 2004   WBC 5.8 8.2 9.7 6.7   HGB 13.2* 12.4* 13.5 15.1   * 87* 100* 106*     Chem:    Recent Labs  Lab 03/13/17  0735 03/12/17  1418 03/11/17 2004 03/09/17  0737   POTASSIUM 4.0 3.9 3.5 3.9   CR 0.92 1.27* 0.98 0.92       Assessment/Plan  71 year old y/o male admitted one day ago for post-TURP bacteremia with E. Coli, being treated with ceftriaxone.  Remains afebrile and hemodynamically stable.  Anticipate DC to home.      - Appreciate cares per medicine service  - Okay to narrow to PO antibiotics based on sensitivities from urologic perspective  - Okay to try pyridium 100 mg TID x 3 days for dysuria  - Urology will continue to follow while in house    Seen and examined with chief resident, to be discussed with Dr. Jefferson    --    A Robert Hadley  Urology Resident           Contacting the Urology Team     Please use the following job codes to reach the Urology Team. Note that you must use an in house phone and that job codes cannot receive text pages.     On weekdays, dial [phone] (or star-star-star 777 on the new [employer] telephones) then 0817 to reach the Adult Urology resident or PA on call    On weekdays, dial [phone] (or star-star-star 777 on the new [employer] telephones) then 0818 to reach the Pediatric Urology resident    On weeknights and weekends, dial [phone] (or star-star-star 777 on the new [employer] telephones) then [phone] to reach the Urology resident on call (for both Adult and Pediatrics)

## 2017-03-14 NOTE — DISCHARGE SUMMARY
[facility] Discharge Summary    Chester Palacios MRN# [id number]   Age: 71 year old YOB: 1945     Date of Admission:  3/12/2017  Date of Discharge::  3/14/2017  Admitting Physician:  Salvador Jenkins MD  Discharge Physician:  Laine Bradley MD     Home clinic: Dariel Jack MD at [facility] (formerly [facility])    RECOMMENDATIONS ON DISCHARGE  - Continue taking prescribed antibiotics until gone: Ciprofloxacin 500mg every 12 hours for 11 more days (end date 3/25/2017)  - Follow-up with the Urology service this Friday (3/17/17) for post-operative check  - Follow-up with your physician, Dr. Dariel Jack, on 4/3/17 as scheduled for continued health maintenance          Admission Diagnoses:   Bacteremia [R78.81]          Discharge Diagnosis:   E.coli UTI / bacteremia          Procedures:   No procedures performed during this admission          Labs:      Lab Results   Component Value Date     03/14/2017    Lab Results   Component Value Date    CHLORIDE 111 03/14/2017    Lab Results   Component Value Date    BUN 14 03/14/2017    BUN 22.2 10/10/2011      Lab Results   Component Value Date    POTASSIUM 3.9 03/14/2017    Lab Results   Component Value Date    CO2 24 03/14/2017    Lab Results   Component Value Date    CR 0.87 03/14/2017        Lab Results   Component Value Date    WBC 5.3 03/14/2017    HGB 14.0 03/14/2017    HCT 42.3 03/14/2017    MCV 90 03/14/2017     (L) 03/14/2017             Discharge Medications:     Current Discharge Medication List      START taking these medications   Ciprofloxacin 500 MG capsule Take 1 capsule (500 mg) by mouth every 12 hours for 11 days  Qty: 33 capsule, Refills: 0    Associated Diagnoses: Bacteremia         CONTINUE these medications which have NOT CHANGED   ACETAMINOPHEN PO Take 650 mg by mouth every 4 hours as needed for pain      tamsulosin (FLOMAX) 0.4 MG capsule Take 1 capsule (0.4 mg) by mouth daily  Qty: 30 capsule, Refills: 1    Associated Diagnoses: Overactive  bladder      atorvastatin (LIPITOR) 40 MG tablet Take 1 tablet (40 mg) by mouth daily  Qty: 90 tablet, Refills: 3    Associated Diagnoses: Hyperlipidemia with target LDL less than 130      DOXEPIN HCL PO Take by mouth At Bedtime Patient states that he takes 1.25 mL HS (12.5mg/1.25mL).      diclofenac (VOLTAREN) 1 % GEL Place onto the skin as needed for moderate pain Patient uses prn on hands for playing [employer].  Qty: 100 g, Refills: 3    Associated Diagnoses: Arthritis of both hands      busPIRone (BUSPAR) 10 MG tablet Take by mouth 3 times daily 20 mg every morning, 10 mg every afternoon, 20 mg HS      docusate sodium (COLACE) 100 MG tablet Take 100 mg by mouth 2 times daily  Qty: 60 tablet, Refills: 1    Associated Diagnoses: Benign prostatic hyperplasia with lower urinary tract symptoms, unspecified morphology      amoxicillin (AMOXIL) 500 MG tablet Take 2000 mg 60 min prior to dental work       aspirin 81 MG tablet Take 81 mg by mouth every morning          STOP taking these medications    oxyCODONE (ROXICODONE) 5 MG IR tablet Comments:   Reason for Stopping:                  Consultations:   Urology          Brief History of Illness:   Chester Palacios is a 70 y/o male who recently underwent TURP on 3/8/2017 and presented to the ED for evaluation of fever along with continued urinary complaints. The patient tolerated the aforementioned procedure with no apparent complications and was discharged to home on post-op day #1. Shortly after discharge, the patient developed increased urinary urgency, frequency, dysuria, and weak stream. As symptoms persisted, he soon reported subjective fevers and sensation of incomplete emptying. On 3/11/17 he contacted the urology service and was advised to seek further evaluation for possible retention and UTI. The patient presented to the [place] ED on 3/11 where he was found to be afebrile and hemodynamically stable with adequate emptying on bladder scan and normal WBC on laboratory  evaluation. Urine and blood cultures were obtained, the patient was given a single dose of ceftriaxone, and discharged on course of ciprofloxacin. The patient did not fill the prescription due to concern of tendonitis associated with the medication. Urine and blood cultures subsequently grew E. coli, and the patient was called and advised to return to the hospital for further treatment.           Hospital Course:   # E.coli Bacteremia  Likely due to UTI. Urine culture and blood cultures (3/11/17) revealed, pan-sensitive E.coli. Urine culture taken on 3/12/17 showed normal urogenital jorge. Subsequent blood cultures taken on 3/12 - 3/14 showed no growth to date. Ceftriaxone 1gram administered q24 from 3/11 - 3/13. Patient was afebrile and hemodynamically stable without signs of persistent infection. Urology was consulted during this admission. At discharge patient was initially discharged on Amoxicillin; however, discussion with ID, decided that given better bioavailability, a levofloxacin would be a better option for gram negative bacteremia.  -- Ciprofloxacin 500mg q12 hours for 11 more days (total 14 days of treatment).   -- F/u with Urology as scheduled (see above)      # Anxiety: no active issues during this admission  - Continued home buspirone and doxepin      # HLD: no active issues during this admission  - Continued home atorvastatin and aspirin       Condition at Discharge:   Patient is stable.    /44  Pulse 64  Temp 96.7  F (35.9  C)  Resp 16  Wt 73.8 kg (162 lb 9.6 oz)  SpO2 97%  BMI 23.33 kg/m2   General: Appears well, sitting in bed eating breakfast, in no acute distress.   HEENT: No subconjunctival hemorrhage noted bilaterally. Anicteric sclera. MMM  Respiratory: Non-labored breathing, lungs clear to auscultation bilaterally.  Cardiovascular: Regular rate and rhythm, normal S1 and S2   Gastrointestinal: Normoactive bowel sounds. Abdomen soft, non-distended, and non-tender.   Extremities: No  extremity edema  Neurologic: A&O x 3, speech normal            Discharge Instructions and Follow-Up:   Discharge diet: Regular   Discharge activity: Activity as tolerated   Discharge follow-up: As noted below          Discharge Procedure Orders  Reason for your hospital stay   Order Comments: You were in the hospital for a urinary tract infection and bacteremia (bacteria in the blood) that occurred after your prostate procedure. You were given antibiotics through your IV and transitioned to oral antibiotics.     Activity   Order Comments: Your activity upon discharge: activity as tolerated   Order Specific Question Answer Comments   Is discharge order? Yes      When to contact your care team   Order Comments: Call your primary doctor or return to the ED if you have any of the following: return of fever, rash, nausea / vomiting, diarrhea, return of cloudy urine, or painful urination     Follow Up and recommended labs and tests   Order Comments: - Follow-up with the Urology service this Friday (3/17/17) for post-operative check    - Follow-up with your physician, Dr. Dariel Jack, on 4/3/17 as scheduled     Discharge Instructions   Order Comments: Take prescribed antibiotics.      Full Code     Diet   Order Comments: Follow this diet upon discharge:     Combination Diet Regular Diet Adult   Order Specific Question Answer Comments   Is discharge order? Yes               Discharge Disposition:   Discharged to home      Patient was seen and discussed with Dr. Laine Bradley    Please feel free to contact me with any questions    Ellie Temple MD  Internal Medicine/Pediatrics, PGY2  Pager: [phone]

## 2017-03-15 ENCOUNTER — CARE COORDINATION (OUTPATIENT)
Dept: CARDIOLOGY | Facility: CLINIC | Age: 72
End: 2017-03-15

## 2017-03-15 NOTE — PROGRESS NOTES
"[facility]  \"Hello, my name is Brittany Ortiz , and I am calling from the [facility].  I want to check in and see how you are doing, after leaving the hospital.  You may also receive a call from your Care Coordinator (care team), but I want to make sure you don t have any urgent needs.  I have a couple questions to review with you:     Post-Discharge Outreach                                                    Chester Palacios is a 71 year old male     Follow-up Appointments           Follow Up and recommended labs and tests       - Follow-up with the Urology service this Friday (3/17/17) for post-operative check     - Follow-up with your physician, Dr. Dariel Jack, on 4/3/17 as scheduled                       Your next 10 appointments already scheduled            Mar 17, 2017 4:30 PM CDT   (Arrive by 4:15 PM)   Post-Op with Rosa Macias MD   [facility] Urology and [facility]rolog[facility] ([facility])     [address]  4th Floor  [place] [address] [phone]                  Apr 03, 2017 1:30 PM CDT   Office Visit with Dariel Jack MD   [facility] ([facility])     [address]  [address]  [place] [address] [phone]             Care Team:    Patient Care Team       Relationship Specialty Notifications Start End    Dariel Jack MD PCP - General Internal Medicine  10/29/12     Phone: [phone] Fax: [phone]         [facility][1] [address] [facility] [address]    Rosa Macias MD MD Urology  2/8/17     Phone: [phone] Fax: [phone]         [address] [facility] [address] [facility] [address]    Olena Lezama, RN Registered Nurse Urology  2/8/17     Dariel Jack MD Referring Physician Internal Medicine  2/8/17     Phone: [phone] Fax: " [phone]         [facility] [address] [facility] [address]            Transition of Care Review                                                      Did you have a surgery or procedure during your hospital visit? No   If yes, do you have any of the following:     Signs of infection: Being treated    Pain:  No     Pain Scale (0-10) 0/10     Location: NA    Wound/incision concerns? NA    Do you have all of your medications/refills?  Yes- picking up today    Are you having any side effects or questions about your medication(s)? No    Do you have any new or worsening symptoms?  No    Do you have any future appointments scheduled?   Yes    3/17       Next 5 appointments (look out 90 days)     Apr 03, 2017  1:30 PM CDT   Office Visit with Dariel Jack MD   [facility] ([facility]    [address] [place] [address] [phone]                      Plan                                                      Thanks for your time.  Your Care Coordinator may follow-up within the next couple days.  In the meantime if you have questions, concerns or problems call your care team.        Brittany Ortiz

## 2017-03-17 ENCOUNTER — OFFICE VISIT (OUTPATIENT)
Dept: UROLOGY | Facility: CLINIC | Age: 72
End: 2017-03-17

## 2017-03-17 VITALS
BODY MASS INDEX: 23.55 KG/M2 | HEART RATE: 52 BPM | SYSTOLIC BLOOD PRESSURE: 123 MMHG | DIASTOLIC BLOOD PRESSURE: 71 MMHG | WEIGHT: 164.5 LBS | HEIGHT: 70 IN

## 2017-03-17 DIAGNOSIS — R35.0 URINARY FREQUENCY: Primary | ICD-10-CM

## 2017-03-17 RX ORDER — CIPROFLOXACIN 500 MG/1
500 TABLET, FILM COATED ORAL
COMMUNITY
Start: 2017-03-15 | End: 2017-04-05

## 2017-03-17 RX ORDER — SOLIFENACIN SUCCINATE 5 MG/1
TABLET, FILM COATED ORAL
Refills: 3 | COMMUNITY
Start: 2017-02-14 | End: 2017-04-05

## 2017-03-17 ASSESSMENT — PAIN SCALES - GENERAL: PAINLEVEL: NO PAIN (0)

## 2017-03-17 NOTE — NURSING NOTE
"Chief Complaint   Patient presents with     Prostate Biopsy     Post TURP       Blood pressure 123/71, pulse 52, height 1.778 m (5' 10\"), weight 74.6 kg (164 lb 8 oz). Body mass index is 23.6 kg/(m^2).    Patient Active Problem List   Diagnosis     Primary localized osteoarthrosis, pelvic region and thigh     Osteoarthritis     Insomnia     Anxiety state     Hearing loss     Hyperlipidemia with target LDL less than 130     Preventive measure     Family history of hemochromatosis     ACP (advance care planning)     Arthritis of right hip     Arthritis of both hands     Nonspecific abnormal electrocardiogram (ECG) (EKG)     Lower urinary tract symptoms (LUTS)     Osteoarthritis, unspecified osteoarthritis type, unspecified site     Neutropenia, unspecified type (H)     IFG (impaired fasting glucose)     Abnormal echocardiogram     Coronary artery calcification seen on computed tomography     BPH (benign prostatic hyperplasia)     Bacteremia       Allergies   Allergen Reactions     Animal Dander      cats       Current Outpatient Prescriptions   Medication Sig Dispense Refill     ciprofloxacin (CIPRO) 500 MG tablet 500 mg       ACETAMINOPHEN PO Take 650 mg by mouth every 4 hours as needed for pain       amoxicillin (AMOXIL) 500 MG tablet Take 2000 mg 60 min prior to dental work        tamsulosin (FLOMAX) 0.4 MG capsule Take 1 capsule (0.4 mg) by mouth daily (Patient taking differently: Take 0.4 mg by mouth every morning ) 30 capsule 1     atorvastatin (LIPITOR) 40 MG tablet Take 1 tablet (40 mg) by mouth daily (Patient taking differently: Take 40 mg by mouth every morning ) 90 tablet 3     DOXEPIN HCL PO Take by mouth At Bedtime Patient states that he takes 1.25 mL HS (12.5mg/1.25mL).       aspirin 81 MG tablet Take 81 mg by mouth every morning        diclofenac (VOLTAREN) 1 % GEL Place onto the skin as needed for moderate pain Patient uses prn on hands for playing guitar. 100 g 3     busPIRone (BUSPAR) 10 MG tablet " Take by mouth 3 times daily 20 mg every morning, 10 mg every afternoon, 20 mg HS       VESICARE 5 MG tablet TK 1 T PO D  3       Social History   Substance Use Topics     Smoking status: Never Smoker     Smokeless tobacco: Never Used     Alcohol use Yes       SPIKE Israel  3/17/2017  4:35 PM

## 2017-03-17 NOTE — LETTER
3/17/2017       RE: Chester Palacios  [address]  [place] [address]     Dear Colleague,    Thank you for referring your patient, Chester Palacios, to the [facility] UROLOGY AND INST FOR PROSTATE AND UROLOGIC CANCERS at [facility]. Please see a copy of my visit note below.    Office Visit Note      UROLOGIC DIAGNOSES:   Frequency, urgency, nocturia     CURRENT INTERVENTIONS:       HISTORY:   Patient presents for follow up for lower urinary tract symptoms.   He is s/p TUNA ~ ten years ago.  Now currently s/p TURP for lower urinary tract symptoms.   Post op course complicated by UTI.   Patient voiding better and has not complaints at present.             PAST MEDICAL HISTORY:   Past Medical History   Diagnosis Date     Anxiety state 10/26/2012     Works with psychiatrist      Arthritis of right hip 12/21/2015     Coronary artery calcification seen on computed tomography 12/12/2016     See Cardiology consult 1/17; cardiac MRI shows EF 60%, no scar       Family history of hemochromatosis 10/25/2015     Brother; Chester had nml labs in 2010      Hyperlipidemia with target LDL less than 130 10/26/2012      Diagnosis updated by automated process. Provider to review and confirm.     Lower urinary tract symptoms (LUTS) 11/5/2016     See urology consult 10/16       Osteoarthritis, unspecified osteoarthritis type, unspecified site 11/7/2016       PAST SURGICAL HISTORY:   Past Surgical History   Procedure Laterality Date     Prostate surgery  2007     TUNA for BPH     Orthopedic surgery       R elbow Orif     Hernia repair  1985     Inguinal     Tonsillectomy       age 5     Joint replacement, hip rt/lt Right 1/16     Joint Replacement Hip RT/LT     Cystoscopy, transurethral resection (tur) prostate, combined N/A 3/8/2017     Procedure: COMBINED CYSTOSCOPY, TRANSURETHRAL RESECTION (TUR) PROSTATE;  Surgeon: Rosa Macias MD;  Location: [facility] OR       FAMILY HISTORY:  "  Family History   Problem Relation Age of Onset     HEART DISEASE Father       age 56; had chest pain,  in the hospital soon after; was a smoker     Genetic Disorder Brother      +HFE gene mutation, but does not have hemochromatosis, but has phlebotomies     Blood Disease Mother      hemolytic anemia;  age 85       SOCIAL HISTORY:   Social History   Substance Use Topics     Smoking status: Never Smoker     Smokeless tobacco: Never Used     Alcohol use Yes       Current Outpatient Prescriptions   Medication     ciprofloxacin (CIPRO) 500 MG tablet     ACETAMINOPHEN PO     amoxicillin (AMOXIL) 500 MG tablet     tamsulosin (FLOMAX) 0.4 MG capsule     atorvastatin (LIPITOR) 40 MG tablet     DOXEPIN HCL PO     aspirin 81 MG tablet     diclofenac (VOLTAREN) 1 % GEL     busPIRone (BUSPAR) 10 MG tablet     VESICARE 5 MG tablet     No current facility-administered medications for this visit.          PHYSICAL EXAM:    /71  Pulse 52  Ht 1.778 m (5' 10\")  Wt 74.6 kg (164 lb 8 oz)  BMI 23.6 kg/m2    HEENT: Normocephalic and atraumatic   Cardiac: Not done  Back/Flank: Not done  CNS/PNS: Not done  Respiratory: Normal non-labored breathing  Abdomen: Soft nontender and nondistended  Peripheral Vascular: Not done  Mental Status: Not done    Penis: Not done  Scrotal Skin: Not done  Testicles: Not done  Epididymis: Not done  Digital Rectal Exam:     Cystoscopy:           Imaging: None    Urinalysis: UA RESULTS:  Recent Labs   Lab Test  16   1020   COLOR  Yellow   APPEARANCE  Clear   URINEGLC  Negative   URINEBILI  Negative   URINEKETONE  Negative   SG  1.025   UBLD  Trace*   URINEPH  6.0   PROTEIN  Negative   UROBILINOGEN  0.2   NITRITE  Negative   LEUKEST  Negative   RBCU  O - 2   WBCU  O - 2       PSA:     Post Void Residual:     Other labs: None today      IMPRESSION:  72 y/o M with lower urinary tract symptoms now status post TURP complicated by UTI     PLAN:  Uroflow/PVR in four weeks  Follow up in four " weeks       Total Time: 15 minutes                                     Total in Consultation: greater than 50%       Discussed post op care and restrictions, UTI, lower urinary tract symptoms, follow up.              Rosa Macias MD

## 2017-03-17 NOTE — MR AVS SNAPSHOT
After Visit Summary   3/17/2017    Chester Palacios    MRN: [id number]           Patient Information     Date Of Birth          1945        Visit Information        Provider Department      3/17/2017 4:30 PM Rosa Macias MD [facility] Urology and [facility] for Prostate and Urologic Cancers        Care Instructions    Please follow up in 1 month         Follow-ups after your visit        Your next 10 appointments already scheduled     Apr 03, 2017  1:30 PM CDT   Office Visit with Dariel Jack MD   [facility] ([facility])    [address]  [address]  [place] [address] [phone]           Bring a current list of meds and any records pertaining to this visit.  For Physicals, please bring immunization records and any forms needing to be filled out.  Please arrive 10 minutes early to complete paperwork.            Apr 21, 2017 11:00 AM CDT   (Arrive by 10:45 AM)   Return Visit with Rosa Macias MD   [facility] Urology and [facility] for [facility]state and Urologic Cancers ([facility])    [address] [address] [phone]              Who to contact     Please call your clinic at [phone] to:    Ask questions about your health    Make or cancel appointments    Discuss your medicines    Learn about your test results    Speak to your doctor   If you have compliments or concerns about an experience at your clinic, or if you wish to file a complaint, please contact [facility] Patient Relations at [phone] or email us at [email or web address]         Additional Information About Your Visit        MyChart Information     [employer] gives you secure access to your electronic health record. If you see a primary care provider, you can also send messages to your care team and make appointments. If you have  "questions, please call your primary care clinic.  If you do not have a primary care provider, please call [phone] and they will assist you.      [employer] is an electronic gateway that provides easy, online access to your medical records. With [employer], you can request a clinic appointment, read your test results, renew a prescription or communicate with your care team.     To access your existing account, please contact your [facility] or call [phone] for assistance.        Care EveryWhere ID     This is your Care EveryWhere ID. This could be used by other organizations to access your [place] medical records  [id number]        Your Vitals Were     Pulse Height BMI (Body Mass Index)             52 1.778 m (5' 10\") 23.6 kg/m2          Blood Pressure from Last 3 Encounters:   03/17/17 123/71   03/14/17 109/44   03/11/17 120/66    Weight from Last 3 Encounters:   03/17/17 74.6 kg (164 lb 8 oz)   03/12/17 73.8 kg (162 lb 9.6 oz)   03/11/17 72.6 kg (160 lb)              Today, you had the following     No orders found for display         Today's Medication Changes          These changes are accurate as of: 3/17/17  6:06 PM.  If you have any questions, ask your nurse or doctor.               These medicines have changed or have updated prescriptions.        Dose/Directions    amoxicillin 500 MG tablet   Commonly known as:  AMOXIL   This may have changed:  Another medication with the same name was removed. Continue taking this medication, and follow the directions you see here.   Changed by:  1,  Pac Cesilia        Take 2000 mg 60 min prior to dental work   Refills:  0       atorvastatin 40 MG tablet   Commonly known as:  LIPITOR   This may have changed:  when to take this   Used for:  Hyperlipidemia with target LDL less than 130        Dose:  40 mg   Take 1 tablet (40 mg) by mouth daily   Quantity:  90 tablet   Refills:  3       tamsulosin 0.4 MG capsule   Commonly known as:  FLOMAX "   This may have changed:  when to take this   Used for:  Overactive bladder        Dose:  0.4 mg   Take 1 capsule (0.4 mg) by mouth daily   Quantity:  30 capsule   Refills:  1         Stop taking these medicines if you haven't already. Please contact your care team if you have questions.     docusate sodium 100 MG tablet   Commonly known as:  COLACE   Stopped by:  Rosa Macias MD                    Primary Care Provider Office Phone # Fax #    Dariel Jack [phone]       [facility] [address] [facility] [address]        Thank you!     Thank you for choosing [facility] UROLOGY AND [facility] FOR PROSTATE AND UROLOGIC CANCERS  for your care. Our goal is always to provide you with excellent care. Hearing back from our patients is one way we can continue to improve our services. Please take a few minutes to complete the written survey that you may receive in the mail after your visit with us. Thank you!             Your Updated Medication List - Protect others around you: Learn how to safely use, store and throw away your medicines at www.disposemymeds.org.          This list is accurate as of: 3/17/17  6:06 PM.  Always use your most recent med list.                   Brand Name Dispense Instructions for use    ACETAMINOPHEN PO      Take 650 mg by mouth every 4 hours as needed for pain       amoxicillin 500 MG tablet    AMOXIL     Take 2000 mg 60 min prior to dental work       aspirin 81 MG tablet      Take 81 mg by mouth every morning       atorvastatin 40 MG tablet    LIPITOR    90 tablet    Take 1 tablet (40 mg) by mouth daily       busPIRone 10 MG tablet    BUSPAR     Take by mouth 3 times daily 20 mg every morning, 10 mg every afternoon, 20 mg HS       ciprofloxacin 500 MG tablet    CIPRO     500 mg       diclofenac 1 % Gel topical gel    VOLTAREN    100 g    Place onto the skin as needed for moderate pain Patient uses prn on hands for playing [employer].        DOXEPIN HCL PO      Take by mouth At Bedtime Patient states that he takes 1.25 mL HS (12.5mg/1.25mL).       tamsulosin 0.4 MG capsule    FLOMAX    30 capsule    Take 1 capsule (0.4 mg) by mouth daily       VESICARE 5 MG tablet   Generic drug:  solifenacin      TK 1 T PO D

## 2017-03-17 NOTE — PROGRESS NOTES
Office Visit Note      UROLOGIC DIAGNOSES:   Frequency, urgency, nocturia     CURRENT INTERVENTIONS:       HISTORY:   Patient presents for follow up for lower urinary tract symptoms.   He is s/p TUNA ~ ten years ago.  Now currently s/p TURP for lower urinary tract symptoms.   Post op course complicated by UTI.   Patient voiding better and has not complaints at present.             PAST MEDICAL HISTORY:   Past Medical History   Diagnosis Date     Anxiety state 10/26/2012     Works with psychiatrist      Arthritis of right hip 2015     Coronary artery calcification seen on computed tomography 2016     See Cardiology consult ; cardiac MRI shows EF 60%, no scar       Family history of hemochromatosis 10/25/2015     Brother; Chester had nml labs in       Hyperlipidemia with target LDL less than 130 10/26/2012      Diagnosis updated by automated process. Provider to review and confirm.     Lower urinary tract symptoms (LUTS) 2016     See urology consult 10/16       Osteoarthritis, unspecified osteoarthritis type, unspecified site 2016       PAST SURGICAL HISTORY:   Past Surgical History   Procedure Laterality Date     Prostate surgery       TUNA for BPH     Orthopedic surgery       R elbow Orif     Hernia repair       Inguinal     Tonsillectomy       age 5     Joint replacement, hip rt/lt Right      Joint Replacement Hip RT/LT     Cystoscopy, transurethral resection (tur) prostate, combined N/A 3/8/2017     Procedure: COMBINED CYSTOSCOPY, TRANSURETHRAL RESECTION (TUR) PROSTATE;  Surgeon: Rosa Macias MD;  Location:  OR       FAMILY HISTORY:   Family History   Problem Relation Age of Onset     HEART DISEASE Father       age 56; had chest pain,  in the hospital soon after; was a smoker     Genetic Disorder Brother      +HFE gene mutation, but does not have hemochromatosis, but has phlebotomies     Blood Disease Mother      hemolytic anemia;  age 85  "      SOCIAL HISTORY:   Social History   Substance Use Topics     Smoking status: Never Smoker     Smokeless tobacco: Never Used     Alcohol use Yes       Current Outpatient Prescriptions   Medication     ciprofloxacin (CIPRO) 500 MG tablet     ACETAMINOPHEN PO     amoxicillin (AMOXIL) 500 MG tablet     tamsulosin (FLOMAX) 0.4 MG capsule     atorvastatin (LIPITOR) 40 MG tablet     DOXEPIN HCL PO     aspirin 81 MG tablet     diclofenac (VOLTAREN) 1 % GEL     busPIRone (BUSPAR) 10 MG tablet     VESICARE 5 MG tablet     No current facility-administered medications for this visit.          PHYSICAL EXAM:    /71  Pulse 52  Ht 1.778 m (5' 10\")  Wt 74.6 kg (164 lb 8 oz)  BMI 23.6 kg/m2    HEENT: Normocephalic and atraumatic   Cardiac: Not done  Back/Flank: Not done  CNS/PNS: Not done  Respiratory: Normal non-labored breathing  Abdomen: Soft nontender and nondistended  Peripheral Vascular: Not done  Mental Status: Not done    Penis: Not done  Scrotal Skin: Not done  Testicles: Not done  Epididymis: Not done  Digital Rectal Exam:     Cystoscopy:           Imaging: None    Urinalysis: UA RESULTS:  Recent Labs   Lab Test  08/29/16   1020   COLOR  Yellow   APPEARANCE  Clear   URINEGLC  Negative   URINEBILI  Negative   URINEKETONE  Negative   SG  1.025   UBLD  Trace*   URINEPH  6.0   PROTEIN  Negative   UROBILINOGEN  0.2   NITRITE  Negative   LEUKEST  Negative   RBCU  O - 2   WBCU  O - 2       PSA:     Post Void Residual:     Other labs: None today      IMPRESSION:  70 y/o M with lower urinary tract symptoms now status post TURP complicated by UTI     PLAN:  Uroflow/PVR in four weeks  Follow up in four weeks       Total Time: 15 minutes                                     Total in Consultation: greater than 50%       Discussed post op care and restrictions, UTI, lower urinary tract symptoms, follow up.                 "

## 2017-03-18 LAB
BACTERIA SPEC CULT: NO GROWTH
BACTERIA SPEC CULT: NO GROWTH
MICRO REPORT STATUS: NORMAL
MICRO REPORT STATUS: NORMAL
SPECIMEN SOURCE: NORMAL
SPECIMEN SOURCE: NORMAL

## 2017-03-19 LAB
BACTERIA SPEC CULT: NO GROWTH
BACTERIA SPEC CULT: NO GROWTH
COPATH REPORT: NORMAL
MICRO REPORT STATUS: NORMAL
MICRO REPORT STATUS: NORMAL
SPECIMEN SOURCE: NORMAL
SPECIMEN SOURCE: NORMAL

## 2017-04-03 ENCOUNTER — PRE VISIT (OUTPATIENT)
Dept: UROLOGY | Facility: CLINIC | Age: 72
End: 2017-04-03

## 2017-04-05 ENCOUNTER — OFFICE VISIT (OUTPATIENT)
Dept: FAMILY MEDICINE | Facility: CLINIC | Age: 72
End: 2017-04-05
Payer: COMMERCIAL

## 2017-04-05 VITALS
RESPIRATION RATE: 20 BRPM | OXYGEN SATURATION: 97 % | BODY MASS INDEX: 23.91 KG/M2 | TEMPERATURE: 97.9 F | DIASTOLIC BLOOD PRESSURE: 62 MMHG | HEART RATE: 57 BPM | SYSTOLIC BLOOD PRESSURE: 134 MMHG | HEIGHT: 70 IN | WEIGHT: 167 LBS

## 2017-04-05 DIAGNOSIS — R82.90 NONSPECIFIC FINDING ON EXAMINATION OF URINE: ICD-10-CM

## 2017-04-05 DIAGNOSIS — E78.5 HYPERLIPIDEMIA LDL GOAL <100: Primary | ICD-10-CM

## 2017-04-05 DIAGNOSIS — R39.9 LOWER URINARY TRACT SYMPTOMS (LUTS): ICD-10-CM

## 2017-04-05 LAB
ALBUMIN UR-MCNC: NEGATIVE MG/DL
ALT SERPL W P-5'-P-CCNC: 34 U/L (ref 0–70)
APPEARANCE UR: ABNORMAL
BACTERIA #/AREA URNS HPF: ABNORMAL /HPF
BILIRUB UR QL STRIP: NEGATIVE
CHOLEST SERPL-MCNC: 167 MG/DL
COLOR UR AUTO: YELLOW
GLUCOSE UR STRIP-MCNC: NEGATIVE MG/DL
HDLC SERPL-MCNC: 50 MG/DL
HGB UR QL STRIP: ABNORMAL
KETONES UR STRIP-MCNC: NEGATIVE MG/DL
LDLC SERPL CALC-MCNC: 102 MG/DL
LEUKOCYTE ESTERASE UR QL STRIP: ABNORMAL
NITRATE UR QL: NEGATIVE
NONHDLC SERPL-MCNC: 117 MG/DL
PH UR STRIP: 5.5 PH (ref 5–7)
RBC #/AREA URNS AUTO: ABNORMAL /HPF (ref 0–2)
SP GR UR STRIP: 1.01 (ref 1–1.03)
TRIGL SERPL-MCNC: 77 MG/DL
URN SPEC COLLECT METH UR: ABNORMAL
UROBILINOGEN UR STRIP-ACNC: 0.2 EU/DL (ref 0.2–1)
WBC #/AREA URNS AUTO: ABNORMAL /HPF (ref 0–2)

## 2017-04-05 PROCEDURE — 36415 COLL VENOUS BLD VENIPUNCTURE: CPT | Performed by: INTERNAL MEDICINE

## 2017-04-05 PROCEDURE — 99213 OFFICE O/P EST LOW 20 MIN: CPT | Performed by: INTERNAL MEDICINE

## 2017-04-05 PROCEDURE — 80061 LIPID PANEL: CPT | Performed by: INTERNAL MEDICINE

## 2017-04-05 PROCEDURE — 81001 URINALYSIS AUTO W/SCOPE: CPT | Performed by: INTERNAL MEDICINE

## 2017-04-05 PROCEDURE — 84460 ALANINE AMINO (ALT) (SGPT): CPT | Performed by: INTERNAL MEDICINE

## 2017-04-05 PROCEDURE — 87086 URINE CULTURE/COLONY COUNT: CPT | Performed by: INTERNAL MEDICINE

## 2017-04-05 NOTE — MR AVS SNAPSHOT
After Visit Summary   4/5/2017    Chester Palacios    MRN: [id number]           Patient Information     Date Of Birth          1945        Visit Information        Provider Department      4/5/2017 8:30 AM Dariel Jack MD [facility]        Today's Diagnoses     Hyperlipidemia LDL goal <100    -  1    Lower urinary tract symptoms (LUTS)          Care Instructions    I will let you know your lab results.           Follow-ups after your visit        Your next 10 appointments already scheduled     Apr 21, 2017 11:00 AM CDT   (Arrive by 10:45 AM)   Return Visit with Rosa Macias MD   [facility] and [facility] ([facility])    [address]  4th Floor  [facility] [address] [phone]              Who to contact     If you have questions or need follow up information about today's clinic visit or your schedule please contact [facility] directly at [phone].  Normal or non-critical lab and imaging results will be communicated to you by openPeoplehart, letter or phone within 4 business days after the clinic has received the results. If you do not hear from us within 7 days, please contact the clinic through openPeoplehart or phone. If you have a critical or abnormal lab result, we will notify you by phone as soon as possible.  Submit refill requests through MegaZebra or call your pharmacy and they will forward the refill request to us. Please allow 3 business days for your refill to be completed.          Additional Information About Your Visit        MyChart Information     MegaZebra gives you secure access to your electronic health record. If you see a primary care provider, you can also send messages to your care team and make appointments. If you have questions, please call your primary care clinic.  If you do not have a primary care provider, please call  "[phone] and they will assist you.        Care EveryWhere ID     This is your Care EveryWhere ID. This could be used by other organizations to access your [place] medical records  [id number]        Your Vitals Were     Pulse Temperature Respirations Height Pulse Oximetry BMI (Body Mass Index)    57 97.9  F (36.6  C) 20 5' 10\" (1.778 m) 97% 23.96 kg/m2       Blood Pressure from Last 3 Encounters:   04/05/17 134/62   03/17/17 123/71   03/14/17 109/44    Weight from Last 3 Encounters:   04/05/17 167 lb (75.8 kg)   03/17/17 164 lb 8 oz (74.6 kg)   03/12/17 162 lb 9.6 oz (73.8 kg)              We Performed the Following     ALT     Lipid Profile (Chol, Trig, HDL, LDL calc)     UA with Microscopic reflex to Culture          Today's Medication Changes          These changes are accurate as of: 4/5/17  9:10 AM.  If you have any questions, ask your nurse or doctor.               These medicines have changed or have updated prescriptions.        Dose/Directions    atorvastatin 40 MG tablet   Commonly known as:  LIPITOR   This may have changed:  when to take this   Used for:  Hyperlipidemia with target LDL less than 130        Dose:  40 mg   Take 1 tablet (40 mg) by mouth daily   Quantity:  90 tablet   Refills:  3                Primary Care Provider Office Phone # Fax #    Dariel Jack [phone]       [facility] [address] [place] [facility] [address]        Thank you!     Thank you for choosing [facility]  for your care. Our goal is always to provide you with excellent care. Hearing back from our patients is one way we can continue to improve our services. Please take a few minutes to complete the written survey that you may receive in the mail after your visit with us. Thank you!             Your Updated Medication List - Protect others around you: Learn how to safely use, store and throw away your medicines at www.disposemymeds.org.          This " list is accurate as of: 4/5/17  9:10 AM.  Always use your most recent med list.                   Brand Name Dispense Instructions for use    ACETAMINOPHEN PO      Take 650 mg by mouth every 4 hours as needed for pain       amoxicillin 500 MG tablet    AMOXIL     Take 2000 mg 60 min prior to dental work       aspirin 81 MG tablet      Take 81 mg by mouth every morning       atorvastatin 40 MG tablet    LIPITOR    90 tablet    Take 1 tablet (40 mg) by mouth daily       busPIRone 10 MG tablet    BUSPAR     Take by mouth 3 times daily 20 mg every morning, 10 mg every afternoon, 20 mg HS       diclofenac 1 % Gel topical gel    VOLTAREN    100 g    Place onto the skin as needed for moderate pain Patient uses prn on hands for playing [employer].       DOXEPIN HCL PO      Take by mouth At Bedtime Patient states that he takes 1.25 mL HS (12.5mg/1.25mL).

## 2017-04-05 NOTE — NURSING NOTE
"Chief Complaint   Patient presents with     Lipids     Toenail       Initial /62 (BP Location: Right arm, Patient Position: Chair, Cuff Size: Adult Regular)  Pulse 57  Temp 97.9  F (36.6  C)  Resp 20  Ht 5' 10\" (1.778 m)  Wt 167 lb (75.8 kg)  SpO2 97%  BMI 23.96 kg/m2 Estimated body mass index is 23.96 kg/(m^2) as calculated from the following:    Height as of this encounter: 5' 10\" (1.778 m).    Weight as of this encounter: 167 lb (75.8 kg).  Medication Reconciliation: complete   Katy Miles LPN  "

## 2017-04-05 NOTE — PROGRESS NOTES
SUBJECTIVE:                                                    Chester Palacios is a 71 year old male who presents to clinic today for the following health issues:      Hyperlipidemia Follow-Up      Rate your low fat/cholesterol diet?: good    Taking statin?  Yes, no muscle aches from statin    Other lipid medications/supplements?:  none       Amount of exercise or physical activity: occ.    Problems taking medications regularly: No    Medication side effects: none    Diet: low salt and low fat/cholesterol    Please check both feet- fungal inf.? On toes?          atorva 40 since 1/17. Wants lipids rechecked.                     Still occas hematuria after prostate surgery.           Slight discomfort; wants urine rechecked for infection.          Off vesicare and flomax.                        He recalls a multi wk course of oral med to Rx toenails; it did not help at all.     Problem list and histories reviewed & adjusted, as indicated.      Current Outpatient Prescriptions   Medication Sig Dispense Refill     ACETAMINOPHEN PO Take 650 mg by mouth every 4 hours as needed for pain       amoxicillin (AMOXIL) 500 MG tablet Take 2000 mg 60 min prior to dental work        atorvastatin (LIPITOR) 40 MG tablet Take 1 tablet (40 mg) by mouth daily (Patient taking differently: Take 40 mg by mouth every morning ) 90 tablet 3     DOXEPIN HCL PO Take by mouth At Bedtime Patient states that he takes 1.25 mL HS (12.5mg/1.25mL).       aspirin 81 MG tablet Take 81 mg by mouth every morning        diclofenac (VOLTAREN) 1 % GEL Place onto the skin as needed for moderate pain Patient uses prn on hands for playing guitar. 100 g 3     busPIRone (BUSPAR) 10 MG tablet Take by mouth 3 times daily 20 mg every morning, 10 mg every afternoon, 20 mg HS       VESICARE 5 MG tablet Reported on 4/5/2017  3     tamsulosin (FLOMAX) 0.4 MG capsule Take 1 capsule (0.4 mg) by mouth daily (Patient not taking: Reported on 4/5/2017) 30 capsule 1  "    Allergies   Allergen Reactions     Animal Dander      cats     BP Readings from Last 3 Encounters:   04/05/17 134/62   03/17/17 123/71   03/14/17 109/44    Wt Readings from Last 3 Encounters:   04/05/17 167 lb (75.8 kg)   03/17/17 164 lb 8 oz (74.6 kg)   03/12/17 162 lb 9.6 oz (73.8 kg)                    Reviewed and updated as needed this visit by clinical staff  Tobacco  Allergies  Meds  Med Hx  Surg Hx  Fam Hx  Soc Hx      Reviewed and updated as needed this visit by Provider         ROS:  CONSTITUTIONAL:NEGATIVE for fever, chills, change in weight  RESP:NEGATIVE for significant cough or SOB  CV: NEGATIVE for chest pain, palpitations or peripheral edema    OBJECTIVE:                                                    /62 (BP Location: Right arm, Patient Position: Chair, Cuff Size: Adult Regular)  Pulse 57  Temp 97.9  F (36.6  C)  Resp 20  Ht 5' 10\" (1.778 m)  Wt 167 lb (75.8 kg)  SpO2 97%  BMI 23.96 kg/m2  Body mass index is 23.96 kg/(m^2).  GENERAL APPEARANCE: healthy, alert and no distress  CV: regular rates and rhythm, normal S1 S2, no S3 or S4 and no murmur, click or rub  SKIN: thickening of some toe nails.    Diagnostic test results:  pending     ASSESSMENT/PLAN:                                                        ICD-10-CM    1. Hyperlipidemia LDL goal <100 E78.5 Lipid Profile (Chol, Trig, HDL, LDL calc)     ALT   2. Lower urinary tract symptoms (LUTS) R39.9 UA with Microscopic reflex to Culture       Check lipids and urine.                       I recommend no Rx for toe nails.   Patient Instructions   I will let you know your lab results.         Dariel Jack MD  [facility]   Results for orders placed or performed in visit on 04/05/17   Lipid Profile (Chol, Trig, HDL, LDL calc)   Result Value Ref Range    Cholesterol 167 <200 mg/dL    Triglycerides 77 <150 mg/dL    HDL Cholesterol 50 >39 mg/dL    LDL Cholesterol Calculated 102 (H) <100 mg/dL    " "Non HDL Cholesterol 117 <130 mg/dL   ALT   Result Value Ref Range    ALT 34 0 - 70 U/L   UA with Microscopic reflex to Culture   Result Value Ref Range    Color Urine Yellow     Appearance Urine Slightly Cloudy     Glucose Urine Negative NEG mg/dL    Bilirubin Urine Negative NEG    Ketones Urine Negative NEG mg/dL    Specific Gravity Urine 1.010 1.003 - 1.035    pH Urine 5.5 5.0 - 7.0 pH    Protein Albumin Urine Negative NEG mg/dL    Urobilinogen Urine 0.2 0.2 - 1.0 EU/dL    Nitrite Urine Negative NEG    Blood Urine Large (A) NEG    Leukocyte Esterase Urine Moderate (A) NEG    Source Midstream Urine     WBC Urine 5-10 (A) 0 - 2 /HPF    RBC Urine 5-10 (A) 0 - 2 /HPF    Bacteria Urine Few (A) NEG /HPF   Urine Culture Aerobic Bacterial   Result Value Ref Range    Specimen Description Midstream Urine     Special Requests Midstream Urine     Culture Micro No growth     Micro Report Status FINAL 04/06/2017      My chart message sent.        Good report on the urine culture; no infection found.                       The LDL or \"bad\" cholesterol could be a bit lower.     Have you missed any doses?         I recommend that you continue the 40 mg atorvastatin and a healthy diet and recheck in 3 months or so.     See you then.        "

## 2017-04-06 LAB
BACTERIA SPEC CULT: NO GROWTH
Lab: NORMAL
MICRO REPORT STATUS: NORMAL
SPECIMEN SOURCE: NORMAL

## 2017-04-21 ENCOUNTER — OFFICE VISIT (OUTPATIENT)
Dept: UROLOGY | Facility: CLINIC | Age: 72
End: 2017-04-21

## 2017-04-21 VITALS
HEART RATE: 53 BPM | DIASTOLIC BLOOD PRESSURE: 68 MMHG | SYSTOLIC BLOOD PRESSURE: 110 MMHG | WEIGHT: 166 LBS | HEIGHT: 70 IN | BODY MASS INDEX: 23.77 KG/M2

## 2017-04-21 DIAGNOSIS — R33.9 URINARY RETENTION: Primary | ICD-10-CM

## 2017-04-21 ASSESSMENT — PAIN SCALES - GENERAL: PAINLEVEL: NO PAIN (0)

## 2017-04-21 NOTE — LETTER
4/21/2017       RE: Chester Palacios  [address]  [place] [address]     Dear Colleague,    Thank you for referring your patient, Chester Palacios, to the [facility] UROLOGY AND INST FOR PROSTATE AND UROLOGIC CANCERS at [facility]. Please see a copy of my visit note below.    Office Visit Note    UROLOGIC DIAGNOSES:   Frequency, urgency, nocturia     CURRENT INTERVENTIONS:     HISTORY:   Patient presents for follow up for lower urinary tract symptoms.   He is s/p TUNA ~ ten years ago.  Now currently s/p TURP for lower urinary tract symptoms.   Post op course complicated by UTI.   Patient voiding better and has not complaints at present.   Patient currently notes some UUI with delaying urination.   Sleeping better as nocturia has improved.   PVR has improved since prior to TURP       PAST MEDICAL HISTORY:   Past Medical History:   Diagnosis Date     Anxiety state 10/26/2012    Works with psychiatrist      Arthritis of right hip 12/21/2015     Coronary artery calcification seen on computed tomography 12/12/2016    See Cardiology consult 1/17; cardiac MRI shows EF 60%, no scar       Family history of hemochromatosis 10/25/2015    Brother; Chester had nml labs in 2010      Hyperlipidemia with target LDL less than 130 10/26/2012     Diagnosis updated by automated process. Provider to review and confirm.     Lower urinary tract symptoms (LUTS) 11/5/2016    See urology consult 10/16       Osteoarthritis, unspecified osteoarthritis type, unspecified site 11/7/2016       PAST SURGICAL HISTORY:   Past Surgical History:   Procedure Laterality Date     CYSTOSCOPY, TRANSURETHRAL RESECTION (TUR) PROSTATE, COMBINED N/A 3/8/2017    Procedure: COMBINED CYSTOSCOPY, TRANSURETHRAL RESECTION (TUR) PROSTATE;  Surgeon: Rosa Macias MD;  Location: [facility] OR     HERNIA REPAIR  1985    Inguinal     JOINT REPLACEMENT, HIP RT/LT Right 1/16    Joint Replacement Hip RT/LT     ORTHOPEDIC  "SURGERY      R elbow Orif     PROSTATE SURGERY      TUNA for BPH     TONSILLECTOMY      age 5       FAMILY HISTORY:   Family History   Problem Relation Age of Onset     HEART DISEASE Father       age 56; had chest pain,  in the hospital soon after; was a smoker     Genetic Disorder Brother      +HFE gene mutation, but does not have hemochromatosis, but has phlebotomies     Blood Disease Mother      hemolytic anemia;  age 85       SOCIAL HISTORY:   Social History   Substance Use Topics     Smoking status: Never Smoker     Smokeless tobacco: Never Used     Alcohol use Yes      Comment: occ.       Current Outpatient Prescriptions   Medication     ACETAMINOPHEN PO     amoxicillin (AMOXIL) 500 MG tablet     atorvastatin (LIPITOR) 40 MG tablet     DOXEPIN HCL PO     aspirin 81 MG tablet     diclofenac (VOLTAREN) 1 % GEL     busPIRone (BUSPAR) 10 MG tablet     No current facility-administered medications for this visit.          PHYSICAL EXAM:    /68  Pulse 53  Ht 1.778 m (5' 10\")  Wt 75.3 kg (166 lb)  BMI 23.82 kg/m2    HEENT: Normocephalic and atraumatic   Cardiac: Not done  Back/Flank: Not done  CNS/PNS: Not done  Respiratory: Normal non-labored breathing  Abdomen: Soft nontender and nondistended  Peripheral Vascular: Not done  Mental Status: Not done    Penis: Not done  Scrotal Skin: Not done  Testicles: Not done  Epididymis: Not done  Digital Rectal Exam:     Cystoscopy:           Imaging: None    Urinalysis: UA RESULTS:  Recent Labs   Lab Test  16   1020   COLOR  Yellow   APPEARANCE  Clear   URINEGLC  Negative   URINEBILI  Negative   URINEKETONE  Negative   SG  1.025   UBLD  Trace*   URINEPH  6.0   PROTEIN  Negative   UROBILINOGEN  0.2   NITRITE  Negative   LEUKEST  Negative   RBCU  O - 2   WBCU  O - 2       PSA:     Post Void Residual:  0ml     Other labs: None today    IMPRESSION:  70 y/o M with lower urinary tract symptoms now status post TURP complicated by UTI now resolved "     PLAN:  Uroflow/PVR in six months   Follow up in six months       Total Time: 15 minutes                                     Total in Consultation: greater than 50%       Discussed UTI, voiding symptoms, further follow up for uroflow/PVR     Again, thank you for allowing me to participate in the care of your patient.      Sincerely,    Rosa Macias MD

## 2017-04-21 NOTE — PATIENT INSTRUCTIONS
Return in six months with a full bladder for a flow/pvr.    It was a pleasure meeting with you today.  Thank you for allowing me and my team the privilege of caring for you today.  YOU are the reason we are here, and I truly hope we provided you with the excellent service you deserve.  Please let us know if there is anything else we can do for you so that we can be sure you are leaving completely satisfied with your care experience.

## 2017-04-21 NOTE — PROGRESS NOTES
Office Visit Note      UROLOGIC DIAGNOSES:   Frequency, urgency, nocturia     CURRENT INTERVENTIONS:       HISTORY:   Patient presents for follow up for lower urinary tract symptoms.   He is s/p TUNA ~ ten years ago.  Now currently s/p TURP for lower urinary tract symptoms.   Post op course complicated by UTI.   Patient voiding better and has not complaints at present.   Patient currently notes some UUI with delaying urination.   Sleeping better as nocturia has improved.   PVR has improved since prior to TURP             PAST MEDICAL HISTORY:   Past Medical History:   Diagnosis Date     Anxiety state 10/26/2012    Works with psychiatrist      Arthritis of right hip 2015     Coronary artery calcification seen on computed tomography 2016    See Cardiology consult ; cardiac MRI shows EF 60%, no scar       Family history of hemochromatosis 10/25/2015    Brother; Chester had nml labs in       Hyperlipidemia with target LDL less than 130 10/26/2012     Diagnosis updated by automated process. Provider to review and confirm.     Lower urinary tract symptoms (LUTS) 2016    See urology consult 10/16       Osteoarthritis, unspecified osteoarthritis type, unspecified site 2016       PAST SURGICAL HISTORY:   Past Surgical History:   Procedure Laterality Date     CYSTOSCOPY, TRANSURETHRAL RESECTION (TUR) PROSTATE, COMBINED N/A 3/8/2017    Procedure: COMBINED CYSTOSCOPY, TRANSURETHRAL RESECTION (TUR) PROSTATE;  Surgeon: Rosa Macias MD;  Location: [facility] OR     HERNIA REPAIR      Inguinal     JOINT REPLACEMENT, HIP RT/LT Right     Joint Replacement Hip RT/LT     ORTHOPEDIC SURGERY      R elbow Orif     PROSTATE SURGERY      TUNA for BPH     TONSILLECTOMY      age 5       FAMILY HISTORY:   Family History   Problem Relation Age of Onset     HEART DISEASE Father       age 56; had chest pain,  in the hospital soon after; was a smoker     Genetic Disorder Brother      +HFE gene  "mutation, but does not have hemochromatosis, but has phlebotomies     Blood Disease Mother      hemolytic anemia;  age 85       SOCIAL HISTORY:   Social History   Substance Use Topics     Smoking status: Never Smoker     Smokeless tobacco: Never Used     Alcohol use Yes      Comment: occ.       Current Outpatient Prescriptions   Medication     ACETAMINOPHEN PO     amoxicillin (AMOXIL) 500 MG tablet     atorvastatin (LIPITOR) 40 MG tablet     DOXEPIN HCL PO     aspirin 81 MG tablet     diclofenac (VOLTAREN) 1 % GEL     busPIRone (BUSPAR) 10 MG tablet     No current facility-administered medications for this visit.          PHYSICAL EXAM:    /68  Pulse 53  Ht 1.778 m (5' 10\")  Wt 75.3 kg (166 lb)  BMI 23.82 kg/m2    HEENT: Normocephalic and atraumatic   Cardiac: Not done  Back/Flank: Not done  CNS/PNS: Not done  Respiratory: Normal non-labored breathing  Abdomen: Soft nontender and nondistended  Peripheral Vascular: Not done  Mental Status: Not done    Penis: Not done  Scrotal Skin: Not done  Testicles: Not done  Epididymis: Not done  Digital Rectal Exam:     Cystoscopy:           Imaging: None    Urinalysis: [facility] RESULTS:  Recent Labs   Lab Test  16   1020   COLOR  Yellow   APPEARANCE  Clear   URINEGLC  Negative   URINEBILI  Negative   URINEKETONE  Negative   SG  1.025   UBLD  Trace*   URINEPH  6.0   PROTEIN  Negative   UROBILINOGEN  0.2   NITRITE  Negative   LEUKEST  Negative   RBCU  O - 2   WBCU  O - 2       PSA:     Post Void Residual:  0ml     Other labs: None today      IMPRESSION:  72 y/o M with lower urinary tract symptoms now status post TURP complicated by UTI now resolved     PLAN:  Uroflow/PVR in six months   Follow up in six months       Total Time: 15 minutes                                     Total in Consultation: greater than 50%       Discussed UTI, voiding symptoms, further follow up for uroflow/PVR                 "

## 2017-04-21 NOTE — MR AVS SNAPSHOT
After Visit Summary   4/21/2017    Chester Palacios    MRN: [id number]           Patient Information     Date Of Birth          1945        Visit Information        Provider Department      4/21/2017 11:00 AM Rosa Macias MD [facility] for Prostate and Urologic Cancers        Today's Diagnoses     Urinary retention    -  1      Care Instructions    Return in six months with a full bladder for a flow/pvr.    It was a pleasure meeting with you today.  Thank you for allowing me and my team the privilege of caring for you today.  YOU are the reason we are here, and I truly hope we provided you with the excellent service you deserve.  Please let us know if there is anything else we can do for you so that we can be sure you are leaving completely satisfied with your care experience.                Follow-ups after your visit        Your next 10 appointments already scheduled     Oct 25, 2017 10:00 AM CDT   (Arrive by 9:45 AM)   Return Visit with Rosa Macias MD   [facility] for Prostate and Urologic Cancers ([facility])    [address] [place] [address] [phone]              Who to contact     Please call your clinic at [phone] to:    Ask questions about your health    Make or cancel appointments    Discuss your medicines    Learn about your test results    Speak to your doctor   If you have compliments or concerns about an experience at your clinic, or if you wish to file a complaint, please contact [facility] Patient Relations at [phone] or email us at [email or web address]         Additional Information About Your Visit        MyChart Information     [employer] gives you secure access to your electronic health record. If you see a primary care provider, you can also send messages to your care team and make appointments. If you have questions,  "please call your primary care clinic.  If you do not have a primary care provider, please call [phone] and they will assist you.      Scout is an electronic gateway that provides easy, online access to your medical records. With Scout, you can request a clinic appointment, read your test results, renew a prescription or communicate with your care team.     To access your existing account, please contact your [facility] or call [phone] for assistance.        Care EveryWhere ID     This is your Care EveryWhere ID. This could be used by other organizations to access your [place] medical records  [id number]        Your Vitals Were     Pulse Height BMI (Body Mass Index)             53 1.778 m (5' 10\") 23.82 kg/m2          Blood Pressure from Last 3 Encounters:   04/21/17 110/68   04/05/17 134/62   03/17/17 123/71    Weight from Last 3 Encounters:   04/21/17 75.3 kg (166 lb)   04/05/17 75.8 kg (167 lb)   03/17/17 74.6 kg (164 lb 8 oz)              We Performed the Following     POST-VOID RESIDUAL BLADDER SCAN          Today's Medication Changes          These changes are accurate as of: 4/21/17 11:43 AM.  If you have any questions, ask your nurse or doctor.               These medicines have changed or have updated prescriptions.        Dose/Directions    atorvastatin 40 MG tablet   Commonly known as:  LIPITOR   This may have changed:  when to take this   Used for:  Hyperlipidemia with target LDL less than 130        Dose:  40 mg   Take 1 tablet (40 mg) by mouth daily   Quantity:  90 tablet   Refills:  3                Primary Care Provider Office Phone # Fax #    Dariel Jack [phone]       [facility] [address] [facility] [address]        Thank you!     Thank you for choosing [facility] AND [facility] FOR PROSTATE AND UROLOGIC CANCERS  for your care. Our goal is always to provide you with excellent care. Hearing back from " our patients is one way we can continue to improve our services. Please take a few minutes to complete the written survey that you may receive in the mail after your visit with us. Thank you!             Your Updated Medication List - Protect others around you: Learn how to safely use, store and throw away your medicines at www.disposemymeds.org.          This list is accurate as of: 4/21/17 11:43 AM.  Always use your most recent med list.                   Brand Name Dispense Instructions for use    ACETAMINOPHEN PO      Take 650 mg by mouth every 4 hours as needed for pain       amoxicillin 500 MG tablet    AMOXIL     Take 2000 mg 60 min prior to dental work       aspirin 81 MG tablet      Take 81 mg by mouth every morning       atorvastatin 40 MG tablet    LIPITOR    90 tablet    Take 1 tablet (40 mg) by mouth daily       busPIRone 10 MG tablet    BUSPAR     Take by mouth 3 times daily 20 mg every morning, 10 mg every afternoon, 20 mg HS       diclofenac 1 % Gel topical gel    VOLTAREN    100 g    Place onto the skin as needed for moderate pain Patient uses prn on hands for playing guElevate HRr.       DOXEPIN HCL PO      Take by mouth At Bedtime Patient states that he takes 1.25 mL HS (12.5mg/1.25mL).

## 2017-04-21 NOTE — NURSING NOTE
Chief Complaint   Patient presents with     RECHECK     1 month follow up for LUTS       Cyndy Anthony MA

## 2017-07-31 ENCOUNTER — OFFICE VISIT (OUTPATIENT)
Dept: FAMILY MEDICINE | Facility: CLINIC | Age: 72
End: 2017-07-31
Payer: COMMERCIAL

## 2017-07-31 VITALS
TEMPERATURE: 97.5 F | SYSTOLIC BLOOD PRESSURE: 120 MMHG | RESPIRATION RATE: 20 BRPM | HEIGHT: 70 IN | OXYGEN SATURATION: 97 % | HEART RATE: 56 BPM | BODY MASS INDEX: 24.05 KG/M2 | WEIGHT: 168 LBS | DIASTOLIC BLOOD PRESSURE: 66 MMHG

## 2017-07-31 DIAGNOSIS — M25.511 RIGHT SHOULDER PAIN, UNSPECIFIED CHRONICITY: Primary | ICD-10-CM

## 2017-07-31 PROCEDURE — 99213 OFFICE O/P EST LOW 20 MIN: CPT | Performed by: INTERNAL MEDICINE

## 2017-07-31 NOTE — PROGRESS NOTES
"  SUBJECTIVE:                                                    Chester Palacios is a 72 year old male who presents to clinic today for the following health issues:      Joint Pain    Onset: 1 year, but worse recently    Description:   Location: right shoulder  Character: Dull ache, Gnawing and Cramping    Intensity: moderate    Progression of Symptoms: worse    Accompanying Signs & Symptoms:  Other symptoms: radiation of pain to down right arm, and upper right back area    History:   Previous similar pain: no       Precipitating factors:   Trauma or overuse: YES- poss? Related to canoeing?    Alleviating factors:  Improved by: nothing    Therapies Tried and outcome: Tyl prn- not \"totally effective\"            This patient has worsening right shoulder soreness. Some of this is anterior some of it is lateral, and he demonstrates some soreness with internal and external rotation as well as abduction against resistance.           He has lots of pain at night. Tylenol helps a little bit.                                                                                         He is very active physically, with tennis,canoeing,swimming,gardening.         Problem list and histories reviewed & adjusted, as indicated.      Current Outpatient Prescriptions   Medication Sig Dispense Refill     ACETAMINOPHEN PO Take 650 mg by mouth every 4 hours as needed for pain       amoxicillin (AMOXIL) 500 MG tablet Take 2000 mg 60 min prior to dental work        atorvastatin (LIPITOR) 40 MG tablet Take 1 tablet (40 mg) by mouth daily (Patient taking differently: Take 40 mg by mouth every morning ) 90 tablet 3     DOXEPIN HCL PO Take by mouth At Bedtime Patient states that he takes 1.25 mL HS (12.5mg/1.25mL).       aspirin 81 MG tablet Take 81 mg by mouth every morning        diclofenac (VOLTAREN) 1 % GEL Place onto the skin as needed for moderate pain Patient uses prn on hands for playing guSolidariumr. 100 g 3     busPIRone (BUSPAR) 10 MG " "tablet Take by mouth 3 times daily 20 mg every morning, 10 mg every afternoon, 20 mg HS       BP Readings from Last 3 Encounters:   07/31/17 120/66   04/21/17 110/68   04/05/17 134/62    Wt Readings from Last 3 Encounters:   07/31/17 168 lb (76.2 kg)   04/21/17 166 lb (75.3 kg)   04/05/17 167 lb (75.8 kg)                      Reviewed and updated as needed this visit by clinical staffTobacco  Allergies  Meds  Med Hx  Surg Hx  Fam Hx  Soc Hx      Reviewed and updated as needed this visit by Provider         ROS:  CONSTITUTIONAL:NEGATIVE for fever, chills, change in weight  MUSCULOSKELETAL: NEGATIVE for neck pain  NEURO: NEGATIVE for numbness or tingling     OBJECTIVE:                                                    /66 (BP Location: Left arm, Patient Position: Chair, Cuff Size: Adult Regular)  Pulse 56  Temp 97.5  F (36.4  C)  Resp 20  Ht 5' 10\" (1.778 m)  Wt 168 lb (76.2 kg)  SpO2 97%  BMI 24.11 kg/m2  Body mass index is 24.11 kg/(m^2).  GENERAL APPEARANCE: alert and no distress  MS: Mildly decreased range of motion right shoulder, and extreme pain with abduction against resistance. I don't detect crepitation with range of motion.    Diagnostic test results:  none        ASSESSMENT/PLAN:                                                      ICD-10-CM    1. Right shoulder pain, unspecified chronicity M25.511        He appears to have an overuse tendinitis of the right shoulder, at least partially involving the rotator cuff.                 Consider using the breaststroke when he swims rather than the front crawl or the back stroke.  Patient Instructions   I recommend that you see orthopedics in the near future.         Meanwhile, you should stop playing tennis for a while.                          Dariel Jack MD  [facility]  "

## 2017-07-31 NOTE — NURSING NOTE
"Chief Complaint   Patient presents with     Musculoskeletal Problem       Initial /66 (BP Location: Left arm, Patient Position: Chair, Cuff Size: Adult Regular)  Pulse 56  Temp 97.5  F (36.4  C)  Resp 20  Ht 5' 10\" (1.778 m)  Wt 168 lb (76.2 kg)  SpO2 97%  BMI 24.11 kg/m2 Estimated body mass index is 24.11 kg/(m^2) as calculated from the following:    Height as of this encounter: 5' 10\" (1.778 m).    Weight as of this encounter: 168 lb (76.2 kg).  Medication Reconciliation: complete   Katy Miles LPN  "

## 2017-07-31 NOTE — PATIENT INSTRUCTIONS
I recommend that you see orthopedics in the near future.         Meanwhile, you should stop playing tennis for a while.

## 2017-07-31 NOTE — MR AVS SNAPSHOT
After Visit Summary   7/31/2017    Chester Palacios    MRN: [id number]           Patient Information     Date Of Birth          1945        Visit Information        Provider Department      7/31/2017 7:30 AM Dariel Jack MD [facility]        Today's Diagnoses     Right shoulder pain, unspecified chronicity    -  1      Care Instructions    I recommend that you see orthopedics in the near future.         Meanwhile, you should stop playing tennis for a while.                              Follow-ups after your visit        Additional Services     ORTHOPEDICS ADULT REFERRAL       Your provider has referred you to: [facility] [place] [phone]   [email or web address]    Please be aware that coverage of these services is subject to the terms and limitations of your health insurance plan.  Call member services at your health plan with any benefit or coverage questions.                             Please see Dr Armen Rosas or one of your other shoulder specialists.   Please bring the following to your appointment:    >>   Any x-rays, CTs or MRIs which have been performed.  Contact the facility where they were done to arrange for  prior to your scheduled appointment.    >>   List of current medications   >>   This referral request   >>   Any documents/labs given to you for this referral                  Your next 10 appointments already scheduled     Oct 25, 2017 10:00 AM CDT   (Arrive by 9:45 AM)   Return Visit with Rosa Macias MD   [facility]rology and Inst for Prostate and Urologic Cancers ([facility])    [address] [place] [address]  4th [facility] [address] [phone]              Who to contact     If you have questions or need follow up information about today's clinic visit or your schedule please contact [facility] directly at  "[phone].  Normal or non-critical lab and imaging results will be communicated to you by MyChart, letter or phone within 4 business days after the clinic has received the results. If you do not hear from us within 7 days, please contact the clinic through [employer] or phone. If you have a critical or abnormal lab result, we will notify you by phone as soon as possible.  Submit refill requests through [employer] or call your pharmacy and they will forward the refill request to us. Please allow 3 business days for your refill to be completed.          Additional Information About Your Visit        [employer] Information     [employer] gives you secure access to your electronic health record. If you see a primary care provider, you can also send messages to your care team and make appointments. If you have questions, please call your primary care clinic.  If you do not have a primary care provider, please call [phone] and they will assist you.        Care EveryWhere ID     This is your Care EveryWhere ID. This could be used by other organizations to access your [place] medical records  [id number]        Your Vitals Were     Pulse Temperature Respirations Height Pulse Oximetry BMI (Body Mass Index)    56 97.5  F (36.4  C) 20 5' 10\" (1.778 m) 97% 24.11 kg/m2       Blood Pressure from Last 3 Encounters:   07/31/17 120/66   04/21/17 110/68   04/05/17 134/62    Weight from Last 3 Encounters:   07/31/17 168 lb (76.2 kg)   04/21/17 166 lb (75.3 kg)   04/05/17 167 lb (75.8 kg)              We Performed the Following     ORTHOPEDICS ADULT REFERRAL          Today's Medication Changes          These changes are accurate as of: 7/31/17  8:01 AM.  If you have any questions, ask your nurse or doctor.               These medicines have changed or have updated prescriptions.        Dose/Directions    atorvastatin 40 MG tablet   Commonly known as:  LIPITOR   This may have changed:  when to take this   Used for:  Hyperlipidemia with " target LDL less than 130        Dose:  40 mg   Take 1 tablet (40 mg) by mouth daily   Quantity:  90 tablet   Refills:  3                Primary Care Provider Office Phone # Fax #    Dariel Jack [phone]       [facility] [address]  [facility] [address]        Equal Access to Services     MONIQUE [facility] HospitalSHANTELL : Hadii aad ku hadasho Soomaali, waaxda luqadaha, qaybta kaalmada adeegyada, waxay idiin hayaan adeeg kharash la'aan ah. So [facility] [phone].    ATENCIÓN: Si habla español, tiene a sepulveda disposición servicios gratuitos de asistencia lingüística. Llame al [phone].    We comply with applicable federal civil rights laws and [place] laws. We do not discriminate on the basis of race, color, national origin, age, disability sex, sexual orientation or gender identity.            Thank you!     Thank you for choosing [facility]  for your care. Our goal is always to provide you with excellent care. Hearing back from our patients is one way we can continue to improve our services. Please take a few minutes to complete the written survey that you may receive in the mail after your visit with us. Thank you!             Your Updated Medication List - Protect others around you: Learn how to safely use, store and throw away your medicines at www.disposemymeds.org.          This list is accurate as of: 7/31/17  8:01 AM.  Always use your most recent med list.                   Brand Name Dispense Instructions for use Diagnosis    ACETAMINOPHEN PO      Take 650 mg by mouth every 4 hours as needed for pain        amoxicillin 500 MG tablet    AMOXIL     Take 2000 mg 60 min prior to dental work        aspirin 81 MG tablet      Take 81 mg by mouth every morning        atorvastatin 40 MG tablet    LIPITOR    90 tablet    Take 1 tablet (40 mg) by mouth daily    Hyperlipidemia with target LDL less than 130       busPIRone 10 MG tablet    BUSPAR     Take by  mouth 3 times daily 20 mg every morning, 10 mg every afternoon, 20 mg HS        diclofenac 1 % Gel topical gel    VOLTAREN    100 g    Place onto the skin as needed for moderate pain Patient uses prn on hands for playing gusceniosr.    Arthritis of both hands       DOXEPIN HCL PO      Take by mouth At Bedtime Patient states that he takes 1.25 mL HS (12.5mg/1.25mL).

## 2017-08-02 ENCOUNTER — TRANSFERRED RECORDS (OUTPATIENT)
Dept: HEALTH INFORMATION MANAGEMENT | Facility: CLINIC | Age: 72
End: 2017-08-02

## 2017-10-09 ENCOUNTER — OFFICE VISIT (OUTPATIENT)
Dept: FAMILY MEDICINE | Facility: CLINIC | Age: 72
End: 2017-10-09
Payer: COMMERCIAL

## 2017-10-09 ENCOUNTER — TELEPHONE (OUTPATIENT)
Dept: FAMILY MEDICINE | Facility: CLINIC | Age: 72
End: 2017-10-09

## 2017-10-09 VITALS
DIASTOLIC BLOOD PRESSURE: 76 MMHG | TEMPERATURE: 97.5 F | RESPIRATION RATE: 20 BRPM | OXYGEN SATURATION: 99 % | HEART RATE: 53 BPM | WEIGHT: 168 LBS | BODY MASS INDEX: 24.05 KG/M2 | SYSTOLIC BLOOD PRESSURE: 130 MMHG | HEIGHT: 70 IN

## 2017-10-09 DIAGNOSIS — M25.511 RIGHT SHOULDER PAIN, UNSPECIFIED CHRONICITY: ICD-10-CM

## 2017-10-09 DIAGNOSIS — Z91.81 AT RISK FOR FALLING: ICD-10-CM

## 2017-10-09 DIAGNOSIS — Z23 NEED FOR PROPHYLACTIC VACCINATION AND INOCULATION AGAINST INFLUENZA: ICD-10-CM

## 2017-10-09 DIAGNOSIS — Z00.00 ROUTINE GENERAL MEDICAL EXAMINATION AT A HEALTH CARE FACILITY: Primary | ICD-10-CM

## 2017-10-09 DIAGNOSIS — Z11.59 NEED FOR HEPATITIS C SCREENING TEST: ICD-10-CM

## 2017-10-09 DIAGNOSIS — E78.5 HYPERLIPIDEMIA LDL GOAL <100: ICD-10-CM

## 2017-10-09 DIAGNOSIS — I25.10 CORONARY ARTERY CALCIFICATION SEEN ON COMPUTED TOMOGRAPHY: ICD-10-CM

## 2017-10-09 DIAGNOSIS — Z79.1 NSAID LONG-TERM USE: ICD-10-CM

## 2017-10-09 DIAGNOSIS — Z12.5 SCREENING FOR PROSTATE CANCER: ICD-10-CM

## 2017-10-09 LAB
BASOPHILS # BLD AUTO: 0.1 10E9/L (ref 0–0.2)
BASOPHILS NFR BLD AUTO: 1.6 %
DIFFERENTIAL METHOD BLD: ABNORMAL
EOSINOPHIL # BLD AUTO: 0.1 10E9/L (ref 0–0.7)
EOSINOPHIL NFR BLD AUTO: 2.9 %
ERYTHROCYTE [DISTWIDTH] IN BLOOD BY AUTOMATED COUNT: 13.1 % (ref 10–15)
HCT VFR BLD AUTO: 46.1 % (ref 40–53)
HGB BLD-MCNC: 15.7 G/DL (ref 13.3–17.7)
LYMPHOCYTES # BLD AUTO: 1.1 10E9/L (ref 0.8–5.3)
LYMPHOCYTES NFR BLD AUTO: 28.2 %
MCH RBC QN AUTO: 30.7 PG (ref 26.5–33)
MCHC RBC AUTO-ENTMCNC: 34.1 G/DL (ref 31.5–36.5)
MCV RBC AUTO: 90 FL (ref 78–100)
MONOCYTES # BLD AUTO: 0.4 10E9/L (ref 0–1.3)
MONOCYTES NFR BLD AUTO: 9.9 %
NEUTROPHILS # BLD AUTO: 2.1 10E9/L (ref 1.6–8.3)
NEUTROPHILS NFR BLD AUTO: 57.4 %
PLATELET # BLD AUTO: 192 10E9/L (ref 150–450)
RBC # BLD AUTO: 5.12 10E12/L (ref 4.4–5.9)
WBC # BLD AUTO: 3.7 10E9/L (ref 4–11)

## 2017-10-09 PROCEDURE — 80053 COMPREHEN METABOLIC PANEL: CPT | Performed by: INTERNAL MEDICINE

## 2017-10-09 PROCEDURE — G0103 PSA SCREENING: HCPCS | Performed by: INTERNAL MEDICINE

## 2017-10-09 PROCEDURE — 80061 LIPID PANEL: CPT | Performed by: INTERNAL MEDICINE

## 2017-10-09 PROCEDURE — 99397 PER PM REEVAL EST PAT 65+ YR: CPT | Mod: 25 | Performed by: INTERNAL MEDICINE

## 2017-10-09 PROCEDURE — 36415 COLL VENOUS BLD VENIPUNCTURE: CPT | Performed by: INTERNAL MEDICINE

## 2017-10-09 PROCEDURE — 86803 HEPATITIS C AB TEST: CPT | Performed by: INTERNAL MEDICINE

## 2017-10-09 PROCEDURE — 85025 COMPLETE CBC W/AUTO DIFF WBC: CPT | Performed by: INTERNAL MEDICINE

## 2017-10-09 PROCEDURE — 90662 IIV NO PRSV INCREASED AG IM: CPT | Performed by: INTERNAL MEDICINE

## 2017-10-09 PROCEDURE — G0008 ADMIN INFLUENZA VIRUS VAC: HCPCS | Performed by: INTERNAL MEDICINE

## 2017-10-09 RX ORDER — TERBINAFINE HYDROCHLORIDE 250 MG/1
1 TABLET ORAL DAILY
Refills: 0 | COMMUNITY
Start: 2017-09-07 | End: 2018-10-10

## 2017-10-09 NOTE — MR AVS SNAPSHOT
After Visit Summary   10/9/2017    Chester Palacios    MRN: [id number]           Patient Information     Date Of Birth          1945        Visit Information        Provider Department      10/9/2017 1:00 PM Dariel Jack MD [facility]        Today's Diagnoses     Routine general medical examination at a health care facility    -  1    Coronary artery calcification seen on computed tomography        Hyperlipidemia LDL goal <100        NSAID long-term use        Screening for prostate cancer        Right shoulder pain, unspecified chronicity        Need for prophylactic vaccination and inoculation against influenza        At risk for falling        Need for hepatitis C screening test          Care Instructions    I will let you know your lab results.   Plan to see Dr Rosas again regarding your right shoulder pain.           Follow-ups after your visit        Your next 10 appointments already scheduled     Oct 25, 2017 10:00 AM CDT   (Arrive by 9:45 AM)   Return Visit with Rosa Macias MD   [facility]rology and Inst for Prostate and Urologic Cancers ([facility])    [address] [place] [address] [phone]              Who to contact     If you have questions or need follow up information about today's clinic visit or your schedule please contact [facility] directly at [phone].  Normal or non-critical lab and imaging results will be communicated to you by MyChart, letter or phone within 4 business days after the clinic has received the results. If you do not hear from us within 7 days, please contact the clinic through MyChart or phone. If you have a critical or abnormal lab result, we will notify you by phone as soon as possible.  Submit refill requests through [employer] or call your pharmacy and they will forward the refill request to us. Please  "allow 3 business days for your refill to be completed.          Additional Information About Your Visit        MyChart Information     [employer] gives you secure access to your electronic health record. If you see a primary care provider, you can also send messages to your care team and make appointments. If you have questions, please call your primary care clinic.  If you do not have a primary care provider, please call [phone] and they will assist you.        Care EveryWhere ID     This is your Care EveryWhere ID. This could be used by other organizations to access your [place] medical records  [id number]        Your Vitals Were     Pulse Temperature Respirations Height Pulse Oximetry BMI (Body Mass Index)    53 97.5  F (36.4  C) 20 5' 10\" (1.778 m) 99% 24.11 kg/m2       Blood Pressure from Last 3 Encounters:   10/09/17 130/76   07/31/17 120/66   04/21/17 110/68    Weight from Last 3 Encounters:   10/09/17 168 lb (76.2 kg)   07/31/17 168 lb (76.2 kg)   04/21/17 166 lb (75.3 kg)              We Performed the Following     CBC with platelets and differential     Comprehensive metabolic panel (BMP + Alb, Alk Phos, ALT, AST, Total. Bili, TP)     Hepatitis C antibody     Lipid Profile (Chol, Trig, HDL, LDL calc)     PSA, screen          Today's Medication Changes          These changes are accurate as of: 10/9/17  1:44 PM.  If you have any questions, ask your nurse or doctor.               These medicines have changed or have updated prescriptions.        Dose/Directions    atorvastatin 40 MG tablet   Commonly known as:  LIPITOR   This may have changed:  when to take this   Used for:  Hyperlipidemia with target LDL less than 130        Dose:  40 mg   Take 1 tablet (40 mg) by mouth daily   Quantity:  90 tablet   Refills:  3                Primary Care Provider Office Phone # Fax #    Dariel Jack [phone] [address]  [facility] [address]        Equal Access to Services     " SOHAM THORNTON : Hadii aad ku hadangeline Sorehanali, waaxda luqadaha, qaybta kaalmada adeegnessada, yoly lashaun haygrace barksdaleladanisidro morelos . So [facility] [phone].    ATENCIÓN: Si habla español, tiene a sepulveda disposición servicios gratuitos de asistencia lingüística. Llame al [phone].    We comply with applicable federal civil rights laws and [place] laws. We do not discriminate on the basis of race, color, national origin, age, disability, sex, sexual orientation, or gender identity.            Thank you!     Thank you for choosing [facility]  for your care. Our goal is always to provide you with excellent care. Hearing back from our patients is one way we can continue to improve our services. Please take a few minutes to complete the written survey that you may receive in the mail after your visit with us. Thank you!             Your Updated Medication List - Protect others around you: Learn how to safely use, store and throw away your medicines at www.disposemymeds.org.          This list is accurate as of: 10/9/17  1:44 PM.  Always use your most recent med list.                   Brand Name Dispense Instructions for use Diagnosis    ACETAMINOPHEN PO      Take 650 mg by mouth every 4 hours as needed for pain        amoxicillin 500 MG tablet    AMOXIL     Take 2000 mg 60 min prior to dental work        aspirin 81 MG tablet      Take 81 mg by mouth every morning        atorvastatin 40 MG tablet    LIPITOR    90 tablet    Take 1 tablet (40 mg) by mouth daily    Hyperlipidemia with target LDL less than 130       busPIRone 10 MG tablet    BUSPAR     Take by mouth 3 times daily 20 mg every morning, 10 mg every afternoon, 20 mg HS        diclofenac 1 % Gel topical gel    VOLTAREN    100 g    Place onto the skin as needed for moderate pain Patient uses prn on hands for playing [employer].    Arthritis of both hands       DOXEPIN HCL PO      Take by mouth At Bedtime Patient states that he takes  1.25 mL HS (12.5mg/1.25mL).        terbinafine 250 MG tablet    lamISIL     Take 1 tablet by mouth daily

## 2017-10-09 NOTE — PATIENT INSTRUCTIONS
I will let you know your lab results.   Plan to see Dr Rosas again regarding your right shoulder pain.

## 2017-10-09 NOTE — PROGRESS NOTES
SUBJECTIVE:   Chester Palacios is a 72 year old male who presents for Preventive Visit.      Are you in the first 12 months of your Medicare coverage?  No    Physical       COGNITIVE SCREEN  1) Repeat 3 items (Banana, Sunrise, Chair)    2) Clock draw: NORMAL  3) 3 item recall: Recalls 3 objects  Results: 3 items recalled: COGNITIVE IMPAIRMENT LESS LIKELY    Mini-CogTM Copyright SUSHANT Medley. Licensed by the author for use in [facility]; reprinted with permission (shaun@[email or web address]). All rights reserved.          Reviewed and updated as needed this visit by clinical staffTobacco  Meds  Med Hx  Surg Hx  Fam Hx  Soc Hx        Reviewed and updated as needed this visit by Provider            The patient does not drink >3 drinks per day nor >7 drinks per week.                       Ongoing R shoulder pain.       S/p steroid injection.          Ibuprofen 400 mg TID or QID.                        Taking terbinafine           Needs LFT's checked.                  Due for labs; not fasting.                         Today's PHQ-2 Score: PHQ-2 ( 1999 Pfizer) 7/31/2017   Q1: Little interest or pleasure in doing things 0   Q2: Feeling down, depressed or hopeless 0   PHQ-2 Score 0       Do you feel safe in your environment - Yes    Do you have a Health Care Directive?: Yes: Advance Directive has been received and scanned.    Current providers sharing in care for this patient include:   Patient Care Team:  Dariel Jack MD as PCP - General (Internal Medicine)  Rosa Macias MD as MD (Urology)  Olena Lezama, RN as Registered Nurse (Urology)  Dariel Jack MD as Referring Physician (Internal Medicine)      Hearing impairment: Yes, has hearing aids    Ability to successfully perform activities of daily living: Yes, no assistance needed     Fall risk:         Home safety:  none identified      The following health maintenance items are reviewed in Epic and correct as of today:Health Maintenance   Topic  Date Due     HEPATITIS C SCREENING  07/19/1963     AORTIC ANEURYSM SCREENING (SYSTEM ASSIGNED)  07/19/2010     FALL RISK ASSESSMENT  12/21/2016     INFLUENZA VACCINE (SYSTEM ASSIGNED)  09/01/2017     COLONOSCOPY Q10 YR  10/27/2019     ADVANCE DIRECTIVE PLANNING Q5 YRS  10/27/2020     TETANUS IMMUNIZATION (SYSTEM ASSIGNED)  12/09/2020     LIPID SCREEN Q5 YR MALE (SYSTEM ASSIGNED)  04/05/2022     PNEUMOCOCCAL  Completed     Patient Active Problem List    Diagnosis Date Noted     Abnormal echocardiogram 12/12/2016     Priority: High     Coronary artery calcification seen on computed tomography 12/12/2016     Priority: High     See Cardiology consult 1/17;          Calcium score = 64%;      cardiac MRI shows EF 60%, no scar; switched to atorvastatin.          Anxiety state 10/26/2012     Priority: High     Works with psychiatrist       Right shoulder pain, unspecified chronicity 07/31/2017     Priority: Medium     Hyperlipidemia LDL goal <100 04/05/2017     Priority: Medium     Bacteremia 03/12/2017     Priority: Medium     BPH (benign prostatic hyperplasia) 03/08/2017     Priority: Medium     Neutropenia, unspecified type (H) 11/08/2016     Priority: Medium     Total white count 3000, with 1.5 neutrophils in November 2016, and platelets 147,000; normal on f/u in 12/16       IFG (impaired fasting glucose) 11/08/2016     Priority: Medium     111 in November 2016       Osteoarthritis, unspecified osteoarthritis type, unspecified site 11/07/2016     Priority: Medium     Lower urinary tract symptoms (LUTS) 11/05/2016     Priority: Medium     See urology consult 10/16         Arthritis of right hip 12/21/2015     Priority: Medium     Arthritis of both hands 12/21/2015     Priority: Medium     Nonspecific abnormal electrocardiogram (ECG) (EKG) 12/21/2015     Priority: Medium     Sinus shelton rate 45; NSSTT changes; echo ordered as a pre-op test.        Family history of hemochromatosis 10/25/2015     Priority: Medium      Brother; Chester had nml labs in 2010       Primary localized osteoarthrosis, pelvic region and thigh 10/26/2012     Priority: Medium     Osteoarthritis 10/26/2012     Priority: Medium     Problem list name updated by automated process. Provider to review       Insomnia 10/26/2012     Priority: Medium     He stopped taking alprazolam in 2015       Hearing loss 10/26/2012     Priority: Medium     Problem list name updated by automated process. Provider to review       ACP (advance care planning) 10/27/2015     Priority: Low     Advance Care Planning 10/27/2015: Receipt of ACP document:  Received: Health Care Directive which was witnessed or notarized on 4/9/14.  Document not previously scanned.  Validation form completed and sent with document to be scanned.  Code Status needs to be updated to reflect choices in most recent ACP document. Notification sent to Dr. Dariel Jack for followup.  Confirmed/documented designated decision maker(s).  Added by Radha Joaquin             Preventive measure 09/26/2013     Priority: Low     APRIMA DATA BASE UNDER THE 9/26/13 NOTE  Colonoscopy 10/09; PSA 1.81 in 10/12; stable. 1.8 in 11/13, 2.2 in 10/14; 2.04 in 10/15; 1.31 in 11/16         Past Surgical History:   Procedure Laterality Date     CYSTOSCOPY, TRANSURETHRAL RESECTION (TUR) PROSTATE, COMBINED N/A 3/8/2017    Procedure: COMBINED CYSTOSCOPY, TRANSURETHRAL RESECTION (TUR) PROSTATE;  Surgeon: Rosa Macias MD;  Location: [facility]     HERNIA REPAIR  1985    Inguinal     JOINT REPLACEMENT, HIP RT/LT Right 1/16    Joint Replacement Hip RT/LT     ORTHOPEDIC SURGERY      R elbow Orif     PROSTATE SURGERY  2007    TUNA for BPH     TONSILLECTOMY      age 5     Social History     Social History     Marital status:      Spouse name: N/A     Number of children: N/A     Years of education: N/A     Occupational History      Self Employed.     Social History Main Topics     Smoking status: Never Smoker     Smokeless  tobacco: Never Used     Alcohol use Yes      Comment: occ.     Drug use: No     Sexual activity: Yes     Partners: Female     Other Topics Concern     Parent/Sibling W/ Cabg, Mi Or Angioplasty Before 65f 55m? Yes     Social History Narrative    Retired 2013       BP Readings from Last 3 Encounters:   10/09/17 130/76   07/31/17 120/66   04/21/17 110/68    Wt Readings from Last 3 Encounters:   10/09/17 168 lb (76.2 kg)   07/31/17 168 lb (76.2 kg)   04/21/17 166 lb (75.3 kg)                  Current Outpatient Prescriptions   Medication Sig Dispense Refill     ACETAMINOPHEN PO Take 650 mg by mouth every 4 hours as needed for pain       amoxicillin (AMOXIL) 500 MG tablet Take 2000 mg 60 min prior to dental work        atorvastatin (LIPITOR) 40 MG tablet Take 1 tablet (40 mg) by mouth daily (Patient taking differently: Take 40 mg by mouth every morning ) 90 tablet 3     DOXEPIN HCL PO Take by mouth At Bedtime Patient states that he takes 1.25 mL HS (12.5mg/1.25mL).       aspirin 81 MG tablet Take 81 mg by mouth every morning        diclofenac (VOLTAREN) 1 % GEL Place onto the skin as needed for moderate pain Patient uses prn on hands for playing [employer]. 100 g 3     busPIRone (BUSPAR) 10 MG tablet Take by mouth 3 times daily 20 mg every morning, 10 mg every afternoon, 20 mg HS       terbinafine (LAMISIL) 250 MG tablet Take 1 tablet by mouth daily  0     Allergies   Allergen Reactions     Animal Dander      cats             ROS:  C: NEGATIVE for fever, chills, change in weight  I: NEGATIVE for worrisome rashes, moles or lesions  E: NEGATIVE for vision changes or irritation  E/M: NEGATIVE for ear, mouth and throat problems  R: NEGATIVE for significant cough or SOB  B: NEGATIVE for masses, tenderness or discharge  CV: NEGATIVE for chest pain, palpitations or peripheral edema  GI: NEGATIVE for nausea, abdominal pain, heartburn, or change in bowel habits  : NEGATIVE for frequency, dysuria, or hematuria  N: NEGATIVE for  "weakness, dizziness or paresthesias  E: NEGATIVE for temperature intolerance, skin/hair changes  H: NEGATIVE for bleeding problems  P: NEGATIVE for changes in mood or affect    OBJECTIVE:   /76 (BP Location: Left arm, Patient Position: Chair, Cuff Size: Adult Regular)  Pulse 53  Temp 97.5  F (36.4  C)  Resp 20  Ht 5' 10\" (1.778 m)  Wt 168 lb (76.2 kg)  SpO2 99%  BMI 24.11 kg/m2 Estimated body mass index is 24.11 kg/(m^2) as calculated from the following:    Height as of 7/31/17: 5' 10\" (1.778 m).    Weight as of 7/31/17: 168 lb (76.2 kg).  EXAM:   GENERAL: healthy, alert and no distress  EYES: Eyes grossly normal to inspection  HENT: normal cephalic/atraumatic, nose and mouth without ulcers or lesions, oropharynx clear, oral mucous membranes moist and hearing aids  NECK: no adenopathy, no asymmetry, masses, or scars and thyroid normal to palpation  RESP: lungs clear to auscultation - no rales, rhonchi or wheezes  CV: regular rate and rhythm, normal S1 S2, no S3 or S4, no murmur, click or rub, no peripheral edema and peripheral pulses strong  ABDOMEN: soft, nontender, without hepatosplenomegaly or masses   (male): normal male genitalia without lesions or urethral discharge, no hernia  RECTAL: sees urology soon  SKIN: no suspicious lesions or rashes  NEURO: Normal strength and tone, mentation intact and speech normal  PSYCH: mentation appears normal, affect normal/bright  LYMPH: normal ant/post cervical, supraclavicular nodes    ASSESSMENT / PLAN:   Chester was seen today for physical.    Diagnoses and all orders for this visit:    Routine general medical examination at a health care facility    Coronary artery calcification seen on computed tomography    Hyperlipidemia LDL goal <100  -     Comprehensive metabolic panel (BMP + Alb, Alk Phos, ALT, AST, Total. Bili, TP)  -     Lipid Profile (Chol, Trig, HDL, LDL calc)    NSAID long-term use  -     CBC with platelets and differential    Screening for " "prostate cancer  -     PSA, screen    Right shoulder pain, unspecified chronicity    Need for prophylactic vaccination and inoculation against influenza    At risk for falling    Need for hepatitis C screening test  -     Hepatitis C antibody                          Summary and implications:     Several issues.         Monitor for adverse effects of NSAID and terbinafine.              Non-fasting labs.                      Check labs and adjust medications as indicated.           Patient Instructions   I will let you know your lab results.   Plan to see Dr Rosas again regarding your right shoulder pain.         End of Life Planning:  Patient currently has an advanced directive: Yes.  Practitioner is supportive of decision.    COUNSELING:  Reviewed preventive health counseling, as reflected in patient instructions  Special attention given to:       Regular exercise       Healthy diet/nutrition        Estimated body mass index is 24.11 kg/(m^2) as calculated from the following:    Height as of 7/31/17: 5' 10\" (1.778 m).    Weight as of 7/31/17: 168 lb (76.2 kg).     reports that he has never smoked. He has never used smokeless tobacco.        Appropriate preventive services were discussed with this patient, including applicable screening as appropriate for cardiovascular disease, diabetes, osteopenia/osteoporosis, and glaucoma.  As appropriate for age/gender, discussed screening for colorectal cancer, prostate cancer, breast cancer, and cervical cancer. Checklist reviewing preventive services available has been given to the patient.    Reviewed patients plan of care and provided an AVS. The Basic Care Plan (routine screening as documented in Health Maintenance) for Chester meets the Care Plan requirement. This Care Plan has been established and reviewed with the Patient.    Counseling Resources:  ATP IV Guidelines  Pooled Cohorts Equation Calculator  Breast Cancer Risk Calculator  FRAX Risk Assessment  ICSI " Preventive Guidelines  Dietary Guidelines for Americans, 2010  USDA's MyPlate  ASA Prophylaxis  Lung CA Screening    Dariel Jack MD  [facility]         Results for orders placed or performed in visit on 10/09/17   Comprehensive metabolic panel (BMP + Alb, Alk Phos, ALT, AST, Total. Bili, TP)   Result Value Ref Range    Sodium 141 133 - 144 mmol/L    Potassium 4.3 3.4 - 5.3 mmol/L    Chloride 106 94 - 109 mmol/L    Carbon Dioxide 26 20 - 32 mmol/L    Anion Gap 9 3 - 14 mmol/L    Glucose 90 70 - 99 mg/dL    Urea Nitrogen 15 7 - 30 mg/dL    Creatinine 0.91 0.66 - 1.25 mg/dL    GFR Estimate 82 >60 mL/min/1.7m2    GFR Estimate If Black >90 >60 mL/min/1.7m2    Calcium 9.3 8.5 - 10.1 mg/dL    Bilirubin Total 0.6 0.2 - 1.3 mg/dL    Albumin 4.0 3.4 - 5.0 g/dL    Protein Total 7.5 6.8 - 8.8 g/dL    Alkaline Phosphatase 86 40 - 150 U/L    ALT 30 0 - 70 U/L    AST 19 0 - 45 U/L   Lipid Profile (Chol, Trig, HDL, LDL calc)   Result Value Ref Range    Cholesterol 156 <200 mg/dL    Triglycerides 75 <150 mg/dL    HDL Cholesterol 49 >39 mg/dL    LDL Cholesterol Calculated 92 <100 mg/dL    Non HDL Cholesterol 107 <130 mg/dL   Hepatitis C antibody   Result Value Ref Range    Hepatitis C Antibody Nonreactive NR^Nonreactive   PSA, screen   Result Value Ref Range    PSA 1.33 0 - 4 ug/L   CBC with platelets and differential   Result Value Ref Range    WBC 3.7 (L) 4.0 - 11.0 10e9/L    RBC Count 5.12 4.4 - 5.9 10e12/L    Hemoglobin 15.7 13.3 - 17.7 g/dL    Hematocrit 46.1 40.0 - 53.0 %    MCV 90 78 - 100 fl    MCH 30.7 26.5 - 33.0 pg    MCHC 34.1 31.5 - 36.5 g/dL    RDW 13.1 10.0 - 15.0 %    Platelet Count 192 150 - 450 10e9/L    Diff Method Automated Method     % Neutrophils 57.4 %    % Lymphocytes 28.2 %    % Monocytes 9.9 %    % Eosinophils 2.9 %    % Basophils 1.6 %    Absolute Neutrophil 2.1 1.6 - 8.3 10e9/L    Absolute Lymphocytes 1.1 0.8 - 5.3 10e9/L    Absolute Monocytes 0.4 0.0 - 1.3 10e9/L     Absolute Eosinophils 0.1 0.0 - 0.7 10e9/L    Absolute Basophils 0.1 0.0 - 0.2 10e9/L     My chart message sent.            Your lab results are normal,including the liver,kidney,glucose,bone marrow,cholesterol,PSA,and Hepatitis C antibody test.                   Keep taking the same medications.        Be sure to take the 40 mg atorvastatin every day.

## 2017-10-09 NOTE — NURSING NOTE
"Chief Complaint   Patient presents with     Physical       Initial /76 (BP Location: Left arm, Patient Position: Chair, Cuff Size: Adult Regular)  Pulse 53  Temp 97.5  F (36.4  C)  Resp 20  Ht 5' 10\" (1.778 m)  Wt 168 lb (76.2 kg)  SpO2 99%  BMI 24.11 kg/m2 Estimated body mass index is 24.11 kg/(m^2) as calculated from the following:    Height as of this encounter: 5' 10\" (1.778 m).    Weight as of this encounter: 168 lb (76.2 kg).  Medication Reconciliation: complete   Katy Miles LPN  "

## 2017-10-09 NOTE — PROGRESS NOTES
Injectable Influenza Immunization Documentation    1.  Is the person to be vaccinated sick today?   No    2. Does the person to be vaccinated have an allergy to a component   of the vaccine?   No    3. Has the person to be vaccinated ever had a serious reaction   to influenza vaccine in the past?   No    4. Has the person to be vaccinated ever had Guillain-Barré syndrome?   No    Form completed by Katy Miles LPN

## 2017-10-09 NOTE — TELEPHONE ENCOUNTER
Reason for Call:  Request for results:    Name of test or procedure: lab for kidney function test needs to be faxed    Date of test of procedure: 10/9    Location of the test or procedure: Dr Anrdade Vogel fax     OK to leave the result message on voice mail or with a family member? YES    Phone number Patient can be reached at:  Home number on file [phone] (home) or Work number on file:  There is no work phone number on file.    Additional comments:     Call taken on 10/9/2017 at 3:36 PM by JILL CARIAS

## 2017-10-10 LAB
ALBUMIN SERPL-MCNC: 4 G/DL (ref 3.4–5)
ALP SERPL-CCNC: 86 U/L (ref 40–150)
ALT SERPL W P-5'-P-CCNC: 30 U/L (ref 0–70)
ANION GAP SERPL CALCULATED.3IONS-SCNC: 9 MMOL/L (ref 3–14)
AST SERPL W P-5'-P-CCNC: 19 U/L (ref 0–45)
BILIRUB SERPL-MCNC: 0.6 MG/DL (ref 0.2–1.3)
BUN SERPL-MCNC: 15 MG/DL (ref 7–30)
CALCIUM SERPL-MCNC: 9.3 MG/DL (ref 8.5–10.1)
CHLORIDE SERPL-SCNC: 106 MMOL/L (ref 94–109)
CHOLEST SERPL-MCNC: 156 MG/DL
CO2 SERPL-SCNC: 26 MMOL/L (ref 20–32)
CREAT SERPL-MCNC: 0.91 MG/DL (ref 0.66–1.25)
GFR SERPL CREATININE-BSD FRML MDRD: 82 ML/MIN/1.7M2
GLUCOSE SERPL-MCNC: 90 MG/DL (ref 70–99)
HCV AB SERPL QL IA: NONREACTIVE
HDLC SERPL-MCNC: 49 MG/DL
LDLC SERPL CALC-MCNC: 92 MG/DL
NONHDLC SERPL-MCNC: 107 MG/DL
POTASSIUM SERPL-SCNC: 4.3 MMOL/L (ref 3.4–5.3)
PROT SERPL-MCNC: 7.5 G/DL (ref 6.8–8.8)
PSA SERPL-ACNC: 1.33 UG/L (ref 0–4)
SODIUM SERPL-SCNC: 141 MMOL/L (ref 133–144)
TRIGL SERPL-MCNC: 75 MG/DL

## 2017-10-13 ENCOUNTER — TRANSFERRED RECORDS (OUTPATIENT)
Dept: HEALTH INFORMATION MANAGEMENT | Facility: CLINIC | Age: 72
End: 2017-10-13

## 2017-10-20 ENCOUNTER — PRE VISIT (OUTPATIENT)
Dept: UROLOGY | Facility: CLINIC | Age: 72
End: 2017-10-20

## 2017-10-25 ENCOUNTER — OFFICE VISIT (OUTPATIENT)
Dept: UROLOGY | Facility: CLINIC | Age: 72
End: 2017-10-25

## 2017-10-25 VITALS
SYSTOLIC BLOOD PRESSURE: 109 MMHG | DIASTOLIC BLOOD PRESSURE: 60 MMHG | HEIGHT: 70 IN | BODY MASS INDEX: 24.05 KG/M2 | WEIGHT: 168 LBS | HEART RATE: 54 BPM

## 2017-10-25 DIAGNOSIS — R39.9 LOWER URINARY TRACT SYMPTOMS (LUTS): Primary | ICD-10-CM

## 2017-10-25 ASSESSMENT — PAIN SCALES - GENERAL: PAINLEVEL: NO PAIN (0)

## 2017-10-25 NOTE — MR AVS SNAPSHOT
After Visit Summary   10/25/2017    Chester Palacios    MRN: [id number]           Patient Information     Date Of Birth          1945        Visit Information        Provider Department      10/25/2017 10:00 AM Rosa Macias MD [facility]rology and [facility]        Today's Diagnoses     Lower urinary tract symptoms (LUTS)    -  1      Care Instructions    Follow up with Dr. Macias in 6 months with flow and bladder scan.  Please arrive with a full bladder.    It was a pleasure meeting with you today.  Thank you for allowing me and my team the privilege of caring for you today.  YOU are the reason we are here, and I truly hope we provided you with the excellent service you deserve.  Please let us know if there is anything else we can do for you so that we can be sure you are leaving completely satisfied with your care experience.      Queta Sotomayor  LORENA          Follow-ups after your visit        Your next 10 appointments already scheduled     Apr 27, 2018  2:15 PM CDT   (Arrive by 2:00 PM)   Return Visit with Rosa Macias MD   [facility]rology and [facility] ([facility])    [address] [place] [id number] [phone]              Who to contact     Please call your clinic at [phone] to:    Ask questions about your health    Make or cancel appointments    Discuss your medicines    Learn about your test results    Speak to your doctor   If you have compliments or concerns about an experience at your clinic, or if you wish to file a complaint, please contact [facility] Patient Relations at [phone] or email us at [email or web address]         Additional Information About Your Visit        MyChart Information     [employer]Suite[employer] gives you secure access to your electronic health record. If you see a primary care provider,  "you can also send messages to your care team and make appointments. If you have questions, please call your primary care clinic.  If you do not have a primary care provider, please call [phone] and they will assist you.      [employer] Platform is an electronic gateway that provides easy, online access to your medical records. With [employer], you can request a clinic appointment, read your test results, renew a prescription or communicate with your care team.     To access your existing account, please contact your [facility] or call [phone] for assistance.        Care EveryWhere ID     This is your Care EveryWhere ID. This could be used by other organizations to access your [place] medical records  [id number]        Your Vitals Were     Pulse Height BMI (Body Mass Index)             54 1.778 m (5' 10\") 24.11 kg/m2          Blood Pressure from Last 3 Encounters:   10/25/17 109/60   10/09/17 130/76   07/31/17 120/66    Weight from Last 3 Encounters:   10/25/17 76.2 kg (168 lb)   10/09/17 76.2 kg (168 lb)   07/31/17 76.2 kg (168 lb)              We Performed the Following     COMPLEX UROFLOWMETRY     POST-VOID RESIDUAL BLADDER SCAN          Today's Medication Changes          These changes are accurate as of: 10/25/17 10:52 AM.  If you have any questions, ask your nurse or doctor.               These medicines have changed or have updated prescriptions.        Dose/Directions    atorvastatin 40 MG tablet   Commonly known as:  LIPITOR   This may have changed:  when to take this   Used for:  Hyperlipidemia with target LDL less than 130        Dose:  40 mg   Take 1 tablet (40 mg) by mouth daily   Quantity:  90 tablet   Refills:  3                Primary Care Provider Office Phone # Fax #    Dariel Jack [phone]       [address] [facility] [address]        Equal Access to Services     SOHAM THORNTON AH: Bernardo Persaud, darshan fang, shaheed " yoly romanphil haro. So [facility] [phone].    ATENCIÓN: Si ierna vazquez, tiene a sepulveda disposición servicios gratuitos de asistencia lingüística. Jeanine al [phone].    We comply with applicable federal civil rights laws and [place] laws. We do not discriminate on the basis of race, color, national origin, age, disability, sex, sexual orientation, or gender identity.            Thank you!     Thank you for choosing [facility] UROLOGY AND [facility] FOR PROSTATE AND UROLOGIC CANCERS  for your care. Our goal is always to provide you with excellent care. Hearing back from our patients is one way we can continue to improve our services. Please take a few minutes to complete the written survey that you may receive in the mail after your visit with us. Thank you!             Your Updated Medication List - Protect others around you: Learn how to safely use, store and throw away your medicines at www.disposemymeds.org.          This list is accurate as of: 10/25/17 10:52 AM.  Always use your most recent med list.                   Brand Name Dispense Instructions for use Diagnosis    ACETAMINOPHEN PO      Take 650 mg by mouth every 4 hours as needed for pain        amoxicillin 500 MG tablet    AMOXIL     Take 2000 mg 60 min prior to dental work        aspirin 81 MG tablet      Take 81 mg by mouth every morning        atorvastatin 40 MG tablet    LIPITOR    90 tablet    Take 1 tablet (40 mg) by mouth daily    Hyperlipidemia with target LDL less than 130       busPIRone 10 MG tablet    BUSPAR     Take by mouth 3 times daily 20 mg every morning, 10 mg every afternoon, 20 mg HS        diclofenac 1 % Gel topical gel    VOLTAREN    100 g    Place onto the skin as needed for moderate pain Patient uses prn on hands for playing [employer].    Arthritis of both hands       DOXEPIN HCL PO      Take by mouth At Bedtime Patient states that he takes 1.25 mL HS (12.5mg/1.25mL).        terbinafine 250 MG  tablet    lamISIL     Take 1 tablet by mouth daily

## 2017-10-25 NOTE — PATIENT INSTRUCTIONS
Follow up with Dr. Macias in 6 months with flow and bladder scan.  Please arrive with a full bladder.    It was a pleasure meeting with you today.  Thank you for allowing me and my team the privilege of caring for you today.  YOU are the reason we are here, and I truly hope we provided you with the excellent service you deserve.  Please let us know if there is anything else we can do for you so that we can be sure you are leaving completely satisfied with your care experience.      SPIKE Quinn

## 2017-10-25 NOTE — LETTER
10/25/2017       RE: Chester Palacios  [address]  [facility] [address]     Dear Colleague,    Thank you for referring your patient, Chester Palacios, to the [facility] UROLOGY AND INST FOR PROSTATE AND UROLOGIC CANCERS at [facility]. Please see a copy of my visit note below.    Office Visit Note      UROLOGIC DIAGNOSES:   Frequency, urgency, nocturia     CURRENT INTERVENTIONS:       HISTORY:   Patient presents for follow up for lower urinary tract symptoms.   He is s/p TUNA ~ ten years ago.  Now currently s/p TURP for lower urinary tract symptoms.   Post op course complicated by UTI.   Patient voiding better.   Patient currently notes some UUI with delaying urination though this is less frequent.   Sleeping better as nocturia has improved though continues to wake 2-4x per night.     Patient notes that his symptoms are stable and that he has few complaints (except for shoulder pain for which he is being evaluated).           PAST MEDICAL HISTORY:   Past Medical History:   Diagnosis Date     Anxiety state 10/26/2012    Works with psychiatrist      Arthritis of right hip 12/21/2015     Coronary artery calcification seen on computed tomography 12/12/2016    See Cardiology consult 1/17; cardiac MRI shows EF 60%, no scar       Family history of hemochromatosis 10/25/2015    Brother; Chester had nml labs in 2010      Hyperlipidemia with target LDL less than 130 10/26/2012     Diagnosis updated by automated process. Provider to review and confirm.     Lower urinary tract symptoms (LUTS) 11/5/2016    See urology consult 10/16       Osteoarthritis, unspecified osteoarthritis type, unspecified site 11/7/2016       PAST SURGICAL HISTORY:   Past Surgical History:   Procedure Laterality Date     CYSTOSCOPY, TRANSURETHRAL RESECTION (TUR) PROSTATE, COMBINED N/A 3/8/2017    Procedure: COMBINED CYSTOSCOPY, TRANSURETHRAL RESECTION (TUR) PROSTATE;  Surgeon: Rosa Macias MD;   "Location: [facility] OR     HERNIA REPAIR  1985    Inguinal     JOINT REPLACEMENT, HIP RT/LT Right     Joint Replacement Hip RT/LT     ORTHOPEDIC SURGERY      R elbow Orif     PROSTATE SURGERY  2007    TUNA for BPH     TONSILLECTOMY      age 5       FAMILY HISTORY:   Family History   Problem Relation Age of Onset     HEART DISEASE Father       age 56; had chest pain,  in the hospital soon after; was a smoker     Genetic Disorder Brother      +HFE gene mutation, but does not have hemochromatosis, but has phlebotomies     Blood Disease Mother      hemolytic anemia;  age 85       SOCIAL HISTORY:   Social History   Substance Use Topics     Smoking status: Never Smoker     Smokeless tobacco: Never Used     Alcohol use Yes      Comment: occ.       Current Outpatient Prescriptions   Medication     terbinafine (LAMISIL) 250 MG tablet     ACETAMINOPHEN PO     amoxicillin (AMOXIL) 500 MG tablet     atorvastatin (LIPITOR) 40 MG tablet     DOXEPIN HCL PO     aspirin 81 MG tablet     diclofenac (VOLTAREN) 1 % GEL     busPIRone (BUSPAR) 10 MG tablet     No current facility-administered medications for this visit.          PHYSICAL EXAM:    /60  Pulse 54  Ht 1.778 m (5' 10\")  Wt 76.2 kg (168 lb)  BMI 24.11 kg/m2    HEENT: Normocephalic and atraumatic   Cardiac: Not done  Back/Flank: Not done  CNS/PNS: Not done  Respiratory: Normal non-labored breathing  Abdomen: Soft nontender and nondistended  Peripheral Vascular: Not done  Mental Status: Not done    Penis: Not done  Scrotal Skin: Not done  Testicles: Not done  Epididymis: Not done  Digital Rectal Exam:     Cystoscopy:           Imaging: None    Urinalysis: [facility] RESULTS:  Recent Labs   Lab Test  16   1020   COLOR  Yellow   APPEARANCE  Clear   URINEGLC  Negative   URINEBILI  Negative   URINEKETONE  Negative   SG  1.025   UBLD  Trace*   URINEPH  6.0   PROTEIN  Negative   UROBILINOGEN  0.2   NITRITE  Negative   LEUKEST  Negative   RBCU  O - 2   WBCU  O - 2 "       PSA: 1.33  Qmax 17.1ml/s   Post Void Residual:  132ml     Other labs: None today      IMPRESSION:  71 y/o M with lower urinary tract symptoms now status post TURP complicated by UTI now resolved     PLAN:  Uroflow/PVR in six months   Follow up in six months       Total Time: 15 minutes                                     Total in Consultation: greater than 50%       Discussed voiding symptoms, further follow up for uroflow/PVR                   Again, thank you for allowing me to participate in the care of your patient.      Sincerely,    Rosa Macias MD

## 2017-10-25 NOTE — PROGRESS NOTES
Office Visit Note      UROLOGIC DIAGNOSES:   Frequency, urgency, nocturia     CURRENT INTERVENTIONS:       HISTORY:   Patient presents for follow up for lower urinary tract symptoms.   He is s/p TUNA ~ ten years ago.  Now currently s/p TURP for lower urinary tract symptoms.   Post op course complicated by UTI.   Patient voiding better.   Patient currently notes some UUI with delaying urination though this is less frequent.   Sleeping better as nocturia has improved though continues to wake 2-4x per night.     Patient notes that his symptoms are stable and that he has few complaints (except for shoulder pain for which he is being evaluated).           PAST MEDICAL HISTORY:   Past Medical History:   Diagnosis Date     Anxiety state 10/26/2012    Works with psychiatrist      Arthritis of right hip 12/21/2015     Coronary artery calcification seen on computed tomography 12/12/2016    See Cardiology consult 1/17; cardiac MRI shows EF 60%, no scar       Family history of hemochromatosis 10/25/2015    Brother; Chester had nml labs in 2010      Hyperlipidemia with target LDL less than 130 10/26/2012     Diagnosis updated by automated process. Provider to review and confirm.     Lower urinary tract symptoms (LUTS) 11/5/2016    See urology consult 10/16       Osteoarthritis, unspecified osteoarthritis type, unspecified site 11/7/2016       PAST SURGICAL HISTORY:   Past Surgical History:   Procedure Laterality Date     CYSTOSCOPY, TRANSURETHRAL RESECTION (TUR) PROSTATE, COMBINED N/A 3/8/2017    Procedure: COMBINED CYSTOSCOPY, TRANSURETHRAL RESECTION (TUR) PROSTATE;  Surgeon: Rosa Macias MD;  Location: [facility] OR     HERNIA REPAIR  1985    Inguinal     JOINT REPLACEMENT, HIP RT/LT Right 1/16    Joint Replacement Hip RT/LT     ORTHOPEDIC SURGERY      R elbow Orif     PROSTATE SURGERY  2007    TUNA for BPH     TONSILLECTOMY      age 5       FAMILY HISTORY:   Family History   Problem Relation Age of Onset     HEART DISEASE  "Father       age 56; had chest pain,  in the hospital soon after; was a smoker     Genetic Disorder Brother      +HFE gene mutation, but does not have hemochromatosis, but has phlebotomies     Blood Disease Mother      hemolytic anemia;  age 85       SOCIAL HISTORY:   Social History   Substance Use Topics     Smoking status: Never Smoker     Smokeless tobacco: Never Used     Alcohol use Yes      Comment: occ.       Current Outpatient Prescriptions   Medication     terbinafine (LAMISIL) 250 MG tablet     ACETAMINOPHEN PO     amoxicillin (AMOXIL) 500 MG tablet     atorvastatin (LIPITOR) 40 MG tablet     DOXEPIN HCL PO     aspirin 81 MG tablet     diclofenac (VOLTAREN) 1 % GEL     busPIRone (BUSPAR) 10 MG tablet     No current facility-administered medications for this visit.          PHYSICAL EXAM:    /60  Pulse 54  Ht 1.778 m (5' 10\")  Wt 76.2 kg (168 lb)  BMI 24.11 kg/m2    HEENT: Normocephalic and atraumatic   Cardiac: Not done  Back/Flank: Not done  CNS/PNS: Not done  Respiratory: Normal non-labored breathing  Abdomen: Soft nontender and nondistended  Peripheral Vascular: Not done  Mental Status: Not done    Penis: Not done  Scrotal Skin: Not done  Testicles: Not done  Epididymis: Not done  Digital Rectal Exam:     Cystoscopy:           Imaging: None    Urinalysis: [facility] RESULTS:  Recent Labs   Lab Test  16   1020   COLOR  Yellow   APPEARANCE  Clear   URINEGLC  Negative   URINEBILI  Negative   URINEKETONE  Negative   SG  1.025   UBLD  Trace*   URINEPH  6.0   PROTEIN  Negative   UROBILINOGEN  0.2   NITRITE  Negative   LEUKEST  Negative   RBCU  O - 2   WBCU  O - 2       PSA: 1.33  Qmax 17.1ml/s   Post Void Residual:  132ml     Other labs: None today      IMPRESSION:  73 y/o M with lower urinary tract symptoms now status post TURP complicated by UTI now resolved     PLAN:  Uroflow/PVR in six months   Follow up in six months       Total Time: 15 minutes                                     " Total in Consultation: greater than 50%       Discussed voiding symptoms, further follow up for uroflow/PVR

## 2017-10-25 NOTE — NURSING NOTE
"Chief Complaint   Patient presents with     RECHECK     Follow up- urinary frequency       Initial /60  Pulse 54  Ht 1.778 m (5' 10\")  Wt 76.2 kg (168 lb)  BMI 24.11 kg/m2 Estimated body mass index is 24.11 kg/(m^2) as calculated from the following:    Height as of this encounter: 1.778 m (5' 10\").    Weight as of this encounter: 76.2 kg (168 lb).  Medication Reconciliation: complete     SPIKE Quinn    "

## 2017-12-24 DIAGNOSIS — E78.5 HYPERLIPIDEMIA WITH TARGET LDL LESS THAN 130: ICD-10-CM

## 2017-12-28 RX ORDER — ATORVASTATIN CALCIUM 40 MG/1
TABLET, FILM COATED ORAL
Qty: 90 TABLET | Refills: 2 | Status: SHIPPED | OUTPATIENT
Start: 2017-12-28 | End: 2018-03-26

## 2017-12-28 NOTE — TELEPHONE ENCOUNTER
Last 10/09/2017.  Requested Prescriptions   Pending Prescriptions Disp Refills     atorvastatin (LIPITOR) 40 MG tablet [Pharmacy Med Name: ATORVASTATIN 40MG TABLETS] 90 tablet 0     Sig: TAKE 1 TABLET BY MOUTH DAILY    Statins Protocol Passed    12/24/2017 10:56 AM       Passed - LDL on file in past 12 months    Recent Labs   Lab Test  10/09/17   1346   LDL  92            Passed - No abnormal creatine kinase in past 12 months    No lab results found.         Passed - Recent or future visit with authorizing provider    Patient had office visit in the last year or has a visit in the next 30 days with authorizing provider.  See chart review.              Passed - Patient is age 18 or older        Prescription approved per [facility] Refill Protocol.

## 2018-01-24 ENCOUNTER — TRANSFERRED RECORDS (OUTPATIENT)
Dept: HEALTH INFORMATION MANAGEMENT | Facility: CLINIC | Age: 73
End: 2018-01-24

## 2018-03-26 DIAGNOSIS — E78.5 HYPERLIPIDEMIA WITH TARGET LDL LESS THAN 130: ICD-10-CM

## 2018-03-26 DIAGNOSIS — Z79.2 PREVENTIVE ANTIBIOTIC: Primary | ICD-10-CM

## 2018-03-26 NOTE — TELEPHONE ENCOUNTER
Reason for Call:  Medication or medication refill:  Do you use a [facility]?  Name of the pharmacy and phone number for the current request:  [id number] - [address]  Name of the medication requested: atorvastatin (LIPITOR) 40 MG tablet and amoxicillin (AMOXIL) 500 MG tablet  Other request: none  Can we leave a detailed message on this number? YES  Phone number patient can be reached at: Home number on file [phone] (home)  Best Time: any  Call taken on 3/26/2018 at 9:18 AM by ANTONINA HARPER

## 2018-03-27 PROBLEM — Z79.2 PREVENTIVE ANTIBIOTIC: Status: ACTIVE | Noted: 2018-03-27

## 2018-03-27 RX ORDER — ATORVASTATIN CALCIUM 40 MG/1
40 TABLET, FILM COATED ORAL DAILY
Qty: 90 TABLET | Refills: 1 | Status: SHIPPED | OUTPATIENT
Start: 2018-03-27 | End: 2018-09-02

## 2018-03-27 RX ORDER — AMOXICILLIN 500 MG/1
TABLET, FILM COATED ORAL
Qty: 4 TABLET | Refills: 11 | Status: SHIPPED | OUTPATIENT
Start: 2018-03-27 | End: 2019-06-28

## 2018-03-27 NOTE — TELEPHONE ENCOUNTER
Last Written Prescription Date:  12-28-17 Lipitor  Last Fill Quantity: 90,  # refills: 2   Last office visit: 10/9/2017 with prescribing provider:  10-9-17   Future Office Visit:    Resent to pharmacy        Routing refill request to provider for review/approval because:  Drug not on the [facility] refill protocol - Amoxicillin  Needs dx also-- thanks

## 2018-03-27 NOTE — TELEPHONE ENCOUNTER
I refilled the medication(s) and e-mailed the rx to the pharmacy.         Please let the patient know.

## 2018-04-03 ENCOUNTER — OFFICE VISIT (OUTPATIENT)
Dept: FAMILY MEDICINE | Facility: CLINIC | Age: 73
End: 2018-04-03
Payer: COMMERCIAL

## 2018-04-03 VITALS
WEIGHT: 170 LBS | BODY MASS INDEX: 24.39 KG/M2 | OXYGEN SATURATION: 100 % | RESPIRATION RATE: 14 BRPM | TEMPERATURE: 97.6 F | SYSTOLIC BLOOD PRESSURE: 114 MMHG | DIASTOLIC BLOOD PRESSURE: 72 MMHG | HEART RATE: 51 BPM

## 2018-04-03 DIAGNOSIS — M67.88 ACHILLES TENDINOSIS: Primary | ICD-10-CM

## 2018-04-03 PROCEDURE — 99213 OFFICE O/P EST LOW 20 MIN: CPT | Performed by: INTERNAL MEDICINE

## 2018-04-03 NOTE — PROGRESS NOTES
SUBJECTIVE:   Chester Palacios is a 72 year old male who presents to clinic today for the following health issues:      Joint Pain    Onset: 3 weeks    Description:   Location: left ankle  Character: Sharp and Dull ache    Intensity: moderate, severe    Progression of Symptoms: worse    Accompanying Signs & Symptoms:  Other symptoms: swelling    History:   Previous similar pain: no       Precipitating factors:   Trauma or overuse: YES- injury during tennis    Alleviating factors:  Improved by: nothing    Therapies Tried and outcome: ice, ibuprofen                     He strained his left Achilles about 3 weeks ago playing tennis.              He did not play for 2 weeks, but then when he played again a week ago he felt some discomfort again.         He has no pain at rest or with walking.    Problem list and histories reviewed & adjusted, as indicated.  Additional history: as documented    Current Outpatient Prescriptions   Medication Sig Dispense Refill     atorvastatin (LIPITOR) 40 MG tablet Take 1 tablet (40 mg) by mouth daily 90 tablet 1     amoxicillin (AMOXIL) 500 MG tablet Take 2000 mg 60 min prior to dental work 4 tablet 11     terbinafine (LAMISIL) 250 MG tablet Take 1 tablet by mouth daily  0     ACETAMINOPHEN PO Take 650 mg by mouth every 4 hours as needed for pain       DOXEPIN HCL PO Take by mouth At Bedtime Patient states that he takes 1.25 mL HS (12.5mg/1.25mL).       aspirin 81 MG tablet Take 81 mg by mouth every morning        diclofenac (VOLTAREN) 1 % GEL Place onto the skin as needed for moderate pain Patient uses prn on hands for playing [employer]. 100 g 3     busPIRone (BUSPAR) 10 MG tablet Take by mouth 3 times daily 20 mg every morning, 10 mg every afternoon, 20 mg HS       BP Readings from Last 3 Encounters:   04/03/18 114/72   10/25/17 109/60   10/09/17 130/76    Wt Readings from Last 3 Encounters:   04/03/18 170 lb (77.1 kg)   10/25/17 168 lb (76.2 kg)   10/09/17 168 lb (76.2 kg)                     Reviewed and updated as needed this visit by clinical staff       Reviewed and updated as needed this visit by Provider         ROS:  CONSTITUTIONAL:NEGATIVE for fever, chills, change in weight    OBJECTIVE:                                                    /72  Pulse 51  Temp 97.6  F (36.4  C) (Tympanic)  Resp 14  Wt 170 lb (77.1 kg)  SpO2 100%  BMI 24.39 kg/m2  Body mass index is 24.39 kg/(m^2).  GENERAL APPEARANCE: alert and no distress  MS: His strength is intact plantar and dorsiflexion left ankle.  There is mild tenderness to palpation of the Achilles tendon several centimeters above the calcaneus.  The tendon feels intact.          Minimal if any swelling noted.    Diagnostic test results:  none      ASSESSMENT/PLAN:                                                        ICD-10-CM    1. Achilles tendinosis M76.60        I gave him some instructions regarding rehabilitation of Achilles tendinopathy.        I offered physical therapy and/or a sports medicine referral.  He is going out of town for the next week, and he will decide later if he wants either of these referrals.  Follow up with Provider -as needed    Dariel Jack MD  [facility]

## 2018-04-03 NOTE — PATIENT INSTRUCTIONS
Achilles Tendon Injury               What is an Achilles tendon injury?   The Achilles tendon connects the heel bone to the calf muscle of the leg. Injury to the tendon may cause it to become inflamed or torn. It lets you point your toes up and down and walk by putting your heel down first and then your toes.   Tendons, are strong bands of connective tissue that attach muscle to bone. When a tendon is acutely injured it is called a strain. Achilles tendinopathy is an injury to your Achilles tendon from overuse. Tendonitis is when a tendon is inflamed. When there are micro-tears in a tendon from repeated injury it is called tendinosis. Tendinopathy is the term for both inflammation and micro-tears. This causes pain at the back of your leg by the heel.   How does it occur?   Achilles tendinopathy can be caused by:   overuse of the Achilles tendon   tight calf muscles   tight Achilles tendons   lots of uphill running   increasing the amount or intensity of sports training, sometimes along with switching to racing flats, which are racing shoes with less heel lift   over-pronation, a problem where your feet roll inward and flatten out more than normal when you walk or run   wearing high heels at work and then switching to lower-heeled shoes for exercise   An Achilles tendon may tear during sudden activity. For example the tendon might tear when you jump or start sprinting.   What are the symptoms?   Achilles tendinopathy causes pain and may cause swelling over the Achilles tendon. The tendon is tender and may be swollen. You will have pain when you rise up on your toes and pain when you stretch the tendon. The range of motion of your ankle may be limited.   When the tendon tears or ruptures, you may feel a pop. If there is a complete tear, you will be unable to lift your heel off the ground or point your toes.   How is it diagnosed?   Your healthcare provider will examine your leg, looking for tenderness  and swelling. Your provider will watch your feet when you walk or run to see if you over-pronate.   How is it treated?   To treat this condition:   Put an ice pack, gel pack, or package of frozen vegetables, wrapped in a cloth on the area every 3 to 4 hours, for up to 20 minutes at a time.   You could also do ice massage. To do this, first freeze water in a Styrofoam cup, then peel the top of the cup away to expose the ice. Hold the bottom of the cup and rub the ice over your tendon for 5 to 10 minutes. Do this 3 to 5 times a day for the first 2 days.   Raise your foot by putting a pillow under your lower leg when you sit or lie down.   Take an anti-inflammatory such as ibuprofen, or other medicine as directed by your provider. Nonsteroidal anti-inflammatory medicines (NSAIDs) may cause stomach bleeding and other problems. These risks increase with age. Read the label and take as directed. Unless recommended by your healthcare provider, do not take for more than 10 days.   While you are recovering from your injury, change your sport or activity to one that does not make your condition worse. For example, you may need to swim instead of run.   Follow your provider's instructions for doing exercises to help you recover.   After you recover from your acute injury, use moist heat for 10 to 15 minutes at a time before you do warm-up and stretching exercises. Do not use heat if you have swelling.   If you over-pronate, your healthcare provider may recommend shoe inserts, called orthotics, to keep your foot stable. You can buy orthotics at a pharmacy or athletic shoe store or they can be custom-made. If your healthcare provider prescribes a heel lift insert for your shoe, wear it at least until your tendon heals and possibly longer. The lift prevents extra stretching of your Achilles tendon.   In some severe cases of Achilles tendonitis, your foot may be put in a cast for several weeks.   A tear of the tendon may require  surgery. If you don't have surgery, your foot may be put in a cast for 6 to 10 weeks.   How long will the effects last?   The length of recovery depends on many factors such as your age, health, and if you have had a previous injury. Recovery time also depends on the severity of the injury. A tendon that is only mildly inflamed and has just started to hurt may improve within a few weeks. A tendon that is significantly inflamed and may have many tiny tears that has been painful for a long time may take up to a few months to improve. You need to stop doing the activities that cause pain until the tendon has healed. If you continue doing activities that cause the tendon pain, your symptoms will return and it will take longer to recover.   When can I return to my normal activities?   Everyone recovers from an injury at a different rate. Return to your activity depends on how soon your Achilles tendon recovers, not by how many days or weeks it has been since your injury has occurred. In general, the longer you have symptoms before you start treatment, the longer it will take to get better. The goal of rehabilitation is to return you to your normal activities as soon as is safely possible. If you return too soon you may worsen your injury.   You may safely return to your normal activities when, starting from the top of the list and progressing to the end, each of the following is true:   You have full range of motion in the injured leg compared to the uninjured leg.   You have full strength of the injured leg compared to the uninjured leg.   You can walk straight ahead without pain or limping.   How can I prevent Achilles tendinopathy?   The best way to prevent Achilles tendon injury is to stretch your calf muscles and Achilles tendons before exercise. If you have tight Achilles tendons or calf muscles, stretch them twice a day whether or not you are doing any sports activities that day.   If you have a tendency to get  Achilles tendinopathy, try to avoid running uphill.     Published by [employer].  This content is reviewed periodically and is subject to change as new health information becomes available. The information is intended to inform and educate and is not a replacement for medical evaluation, advice, diagnosis or treatment by a healthcare professional.   Written by Nael Hamilton MD, for [employer].   ? 2010 [facility] and/or its affiliates. All Rights Reserved.   Copyright   [employer] 2011  Adult Health Advisor    Achilles Tendon Injury Rehabilitation Exercises               You can do the towel stretch right away. When the towel stretch is easy, try the standing calf stretch, soleus stretch, and leg lift. When you no longer have sharp pain in your calf or tendon, you can do the step-up, heel raises, and static and dynamic balance exercises.   Towel stretch: Sit on a hard surface with your injured leg stretched out in front of you. Loop a towel around your toes and the ball of your foot and pull the towel toward your body keeping your leg straight. Hold this position for 15 to 30 seconds and then relax. Repeat 3 times.   Standing calf stretch: Stand facing a wall with your hands on the wall at about eye level. Keep your injured leg back with your heel on the floor. Keep the other leg forward with the knee bent. Turn your back foot slightly inward (as if you were pigeon-toed). Slowly lean into the wall until you feel a stretch in the back of your calf. Hold the stretch for 15 to 30 seconds. Return to the starting position. Repeat 3 times. Do this exercise several times each day.   Standing soleus stretch: Stand facing a wall with your hands on the wall at about chest height. Keep your injured leg back with your heel on the floor. Keep the other leg forward with the knee bent. Turn your back foot slightly inward (as if you were pigeon-toed). Bend your back knee slightly and gently lean into the wall  until you feel a stretch in the lower calf of your injured leg. Hold the stretch for 15 to 30 seconds. Return to the starting position. Repeat 3 times.   Side-lying leg lift: Lie on your uninjured side. Tighten the front thigh muscles on your injured leg and lift that leg 8 to 10 inches away from the other leg. Keep the leg straight and lower it slowly. Do 3 sets of 10.   Step-up: Stand with the foot of your injured leg on a support 3 to 5 inches high (like a small step or block of wood). Keep your other foot flat on the floor. Shift your weight onto the injured leg on the support. Straighten your injured leg as the other leg comes off the floor. Return to the starting position by bending your injured leg and slowly lowering your uninjured leg back to the floor. Do 3 sets of 10.   Heel raise: Balance yourself while standing behind a chair or counter. Using the chair or counter as a support to help you, raise your body up onto your toes and hold for 5 seconds. Then slowly lower yourself down without holding onto the support. (It's OK to keep holding onto the support if you need to.) When this exercise becomes less painful, try lowering yourself down on the injured leg only. Repeat 10 times. Do 3 sets of 10. Rest 30 seconds between sets.   Balance and reach exercises Stand next to a chair with your injured leg further from the chair. The chair will provide support if you need it. Stand on just the foot of your injured leg. Try to raise the arch of this foot while keeping your big toe on the floor.   0. Keep your foot in this position and with the hand that is further away from the chair, reach forward in front of you. Allow the knee on your injured side to bend. Repeat this 10 times while keeping the arch height. To make the exercise more challenging, reach farther in front of you. Do 2 sets of 10.   0.  the same position as above. While keeping your arch height, reach the hand that is further away from the  chair across your body toward the chair. The farther you reach, the more challenging the exercise. Do 2 sets of 10.   Published by [employer].  This content is reviewed periodically and is subject to change as new health information becomes available. The information is intended to inform and educate and is not a replacement for medical evaluation, advice, diagnosis or treatment by a healthcare professional.   Written by Delfina Hogue, MS, PT, and Leilani Vu PT, [facility], [facility], for [employer].

## 2018-04-03 NOTE — MR AVS SNAPSHOT
After Visit Summary   4/3/2018    Chester Palacios    MRN: [id number]           Patient Information     Date Of Birth          1945        Visit Information        Provider Department      4/3/2018 1:45 PM Dariel Jack MD [facility]        Today's Diagnoses     Achilles tendinosis    -  1      Care Instructions                     Achilles Tendon Injury               What is an Achilles tendon injury?   The Achilles tendon connects the heel bone to the calf muscle of the leg. Injury to the tendon may cause it to become inflamed or torn. It lets you point your toes up and down and walk by putting your heel down first and then your toes.   Tendons, are strong bands of connective tissue that attach muscle to bone. When a tendon is acutely injured it is called a strain. Achilles tendinopathy is an injury to your Achilles tendon from overuse. Tendonitis is when a tendon is inflamed. When there are micro-tears in a tendon from repeated injury it is called tendinosis. Tendinopathy is the term for both inflammation and micro-tears. This causes pain at the back of your leg by the heel.   How does it occur?   Achilles tendinopathy can be caused by:   overuse of the Achilles tendon   tight calf muscles   tight Achilles tendons   lots of uphill running   increasing the amount or intensity of sports training, sometimes along with switching to racing flats, which are racing shoes with less heel lift   over-pronation, a problem where your feet roll inward and flatten out more than normal when you walk or run   wearing high heels at work and then switching to lower-heeled shoes for exercise   An Achilles tendon may tear during sudden activity. For example the tendon might tear when you jump or start sprinting.   What are the symptoms?   Achilles tendinopathy causes pain and may cause swelling over the Achilles tendon. The tendon is tender and may be swollen. You will have pain  when you rise up on your toes and pain when you stretch the tendon. The range of motion of your ankle may be limited.   When the tendon tears or ruptures, you may feel a pop. If there is a complete tear, you will be unable to lift your heel off the ground or point your toes.   How is it diagnosed?   Your healthcare provider will examine your leg, looking for tenderness and swelling. Your provider will watch your feet when you walk or run to see if you over-pronate.   How is it treated?   To treat this condition:   Put an ice pack, gel pack, or package of frozen vegetables, wrapped in a cloth on the area every 3 to 4 hours, for up to 20 minutes at a time.   You could also do ice massage. To do this, first freeze water in a Styrofoam cup, then peel the top of the cup away to expose the ice. Hold the bottom of the cup and rub the ice over your tendon for 5 to 10 minutes. Do this 3 to 5 times a day for the first 2 days.   Raise your foot by putting a pillow under your lower leg when you sit or lie down.   Take an anti-inflammatory such as ibuprofen, or other medicine as directed by your provider. Nonsteroidal anti-inflammatory medicines (NSAIDs) may cause stomach bleeding and other problems. These risks increase with age. Read the label and take as directed. Unless recommended by your healthcare provider, do not take for more than 10 days.   While you are recovering from your injury, change your sport or activity to one that does not make your condition worse. For example, you may need to swim instead of run.   Follow your provider's instructions for doing exercises to help you recover.   After you recover from your acute injury, use moist heat for 10 to 15 minutes at a time before you do warm-up and stretching exercises. Do not use heat if you have swelling.   If you over-pronate, your healthcare provider may recommend shoe inserts, called orthotics, to keep your foot stable. You can buy orthotics at a pharmacy or  athletic shoe store or they can be custom-made. If your healthcare provider prescribes a heel lift insert for your shoe, wear it at least until your tendon heals and possibly longer. The lift prevents extra stretching of your Achilles tendon.   In some severe cases of Achilles tendonitis, your foot may be put in a cast for several weeks.   A tear of the tendon may require surgery. If you don't have surgery, your foot may be put in a cast for 6 to 10 weeks.   How long will the effects last?   The length of recovery depends on many factors such as your age, health, and if you have had a previous injury. Recovery time also depends on the severity of the injury. A tendon that is only mildly inflamed and has just started to hurt may improve within a few weeks. A tendon that is significantly inflamed and may have many tiny tears that has been painful for a long time may take up to a few months to improve. You need to stop doing the activities that cause pain until the tendon has healed. If you continue doing activities that cause the tendon pain, your symptoms will return and it will take longer to recover.   When can I return to my normal activities?   Everyone recovers from an injury at a different rate. Return to your activity depends on how soon your Achilles tendon recovers, not by how many days or weeks it has been since your injury has occurred. In general, the longer you have symptoms before you start treatment, the longer it will take to get better. The goal of rehabilitation is to return you to your normal activities as soon as is safely possible. If you return too soon you may worsen your injury.   You may safely return to your normal activities when, starting from the top of the list and progressing to the end, each of the following is true:   You have full range of motion in the injured leg compared to the uninjured leg.   You have full strength of the injured leg compared to the uninjured leg.   You can walk  straight ahead without pain or limping.   How can I prevent Achilles tendinopathy?   The best way to prevent Achilles tendon injury is to stretch your calf muscles and Achilles tendons before exercise. If you have tight Achilles tendons or calf muscles, stretch them twice a day whether or not you are doing any sports activities that day.   If you have a tendency to get Achilles tendinopathy, try to avoid running uphill.     Published by [employer].  This content is reviewed periodically and is subject to change as new health information becomes available. The information is intended to inform and educate and is not a replacement for medical evaluation, advice, diagnosis or treatment by a healthcare professional.   Written by Nael Hamilton MD, for [employer].   ? 2010 [employer] and/or its affiliates. All Rights Reserved.   Copyright   [employer] 2011  Adult Health Advisor    Achilles Tendon Injury Rehabilitation Exercises               You can do the towel stretch right away. When the towel stretch is easy, try the standing calf stretch, soleus stretch, and leg lift. When you no longer have sharp pain in your calf or tendon, you can do the step-up, heel raises, and static and dynamic balance exercises.   Towel stretch: Sit on a hard surface with your injured leg stretched out in front of you. Loop a towel around your toes and the ball of your foot and pull the towel toward your body keeping your leg straight. Hold this position for 15 to 30 seconds and then relax. Repeat 3 times.   Standing calf stretch: Stand facing a wall with your hands on the wall at about eye level. Keep your injured leg back with your heel on the floor. Keep the other leg forward with the knee bent. Turn your back foot slightly inward (as if you were pigeon-toed). Slowly lean into the wall until you feel a stretch in the back of your calf. Hold the stretch for 15 to 30 seconds. Return to the starting position. Repeat 3  times. Do this exercise several times each day.   Standing soleus stretch: Stand facing a wall with your hands on the wall at about chest height. Keep your injured leg back with your heel on the floor. Keep the other leg forward with the knee bent. Turn your back foot slightly inward (as if you were pigeon-toed). Bend your back knee slightly and gently lean into the wall until you feel a stretch in the lower calf of your injured leg. Hold the stretch for 15 to 30 seconds. Return to the starting position. Repeat 3 times.   Side-lying leg lift: Lie on your uninjured side. Tighten the front thigh muscles on your injured leg and lift that leg 8 to 10 inches away from the other leg. Keep the leg straight and lower it slowly. Do 3 sets of 10.   Step-up: Stand with the foot of your injured leg on a support 3 to 5 inches high (like a small step or block of wood). Keep your other foot flat on the floor. Shift your weight onto the injured leg on the support. Straighten your injured leg as the other leg comes off the floor. Return to the starting position by bending your injured leg and slowly lowering your uninjured leg back to the floor. Do 3 sets of 10.   Heel raise: Balance yourself while standing behind a chair or counter. Using the chair or counter as a support to help you, raise your body up onto your toes and hold for 5 seconds. Then slowly lower yourself down without holding onto the support. (It's OK to keep holding onto the support if you need to.) When this exercise becomes less painful, try lowering yourself down on the injured leg only. Repeat 10 times. Do 3 sets of 10. Rest 30 seconds between sets.   Balance and reach exercises Stand next to a chair with your injured leg further from the chair. The chair will provide support if you need it. Stand on just the foot of your injured leg. Try to raise the arch of this foot while keeping your big toe on the floor.   0. Keep your foot in this position and with the hand  that is further away from the chair, reach forward in front of you. Allow the knee on your injured side to bend. Repeat this 10 times while keeping the arch height. To make the exercise more challenging, reach farther in front of you. Do 2 sets of 10.   0.  the same position as above. While keeping your arch height, reach the hand that is further away from the chair across your body toward the chair. The farther you reach, the more challenging the exercise. Do 2 sets of 10.   Published by [employer].  This content is reviewed periodically and is subject to change as new health information becomes available. The information is intended to inform and educate and is not a replacement for medical evaluation, advice, diagnosis or treatment by a healthcare professional.   Written by Delfina Hogue, MS, PT, and Leilani Vu PT, [facility], [facility], for [facility].                       Follow-ups after your visit        Your next 10 appointments already scheduled     May 04, 2018  1:30 PM CDT   (Arrive by 1:15 PM)   Return Visit with Rosa Macias MD   [facility]rologic [facility] ([facility])    [address] [place] [id number] [phone]              Who to contact     If you have questions or need follow up information about today's clinic visit or your schedule please contact [facility] directly at [phone].  Normal or non-critical lab and imaging results will be communicated to you by MyChart, letter or phone within 4 business days after the clinic has received the results. If you do not hear from us within 7 days, please contact the clinic through MyChart or phone. If you have a critical or abnormal lab result, we will notify you by phone as soon as possible.  Submit refill requests through [employer] or call your pharmacy and they will forward the refill request to us. Please allow 3  business days for your refill to be completed.          Additional Information About Your Visit        MyChart Information     [employer] gives you secure access to your electronic health record. If you see a primary care provider, you can also send messages to your care team and make appointments. If you have questions, please call your primary care clinic.  If you do not have a primary care provider, please call [phone] and they will assist you.        Care EveryWhere ID     This is your Care EveryWhere ID. This could be used by other organizations to access your [place] medical records  [id number]        Your Vitals Were     Pulse Temperature Respirations Pulse Oximetry BMI (Body Mass Index)       51 97.6  F (36.4  C) (Tympanic) 14 100% 24.39 kg/m2        Blood Pressure from Last 3 Encounters:   04/03/18 114/72   10/25/17 109/60   10/09/17 130/76    Weight from Last 3 Encounters:   04/03/18 170 lb (77.1 kg)   10/25/17 168 lb (76.2 kg)   10/09/17 168 lb (76.2 kg)              Today, you had the following     No orders found for display       Primary Care Provider Office Phone # Fax #    Dariel Jack [phone]       [address] [facility] [address]        Equal Access to Services     SOHAM THORNTON : Hadii aad ku hadasho Soomaali, waaxda luqadaha, qaybta kaalmada adeegyada, waxay idiin hayaan adephil khvielka morelos . So [facility] [phone].    ATENCIÓN: Si habla español, tiene a sepulveda disposición servicios gratuitos de asistencia lingüística. Llame al [phone].    We comply with applicable federal civil rights laws and [place] laws. We do not discriminate on the basis of race, color, national origin, age, disability, sex, sexual orientation, or gender identity.            Thank you!     Thank you for choosing [facility]  for your care. Our goal is always to provide you with excellent care. Hearing back from our patients is one way we can continue to  improve our services. Please take a few minutes to complete the written survey that you may receive in the mail after your visit with us. Thank you!             Your Updated Medication List - Protect others around you: Learn how to safely use, store and throw away your medicines at www.disposemymeds.org.          This list is accurate as of 4/3/18  2:27 PM.  Always use your most recent med list.                   Brand Name Dispense Instructions for use Diagnosis    ACETAMINOPHEN PO      Take 650 mg by mouth every 4 hours as needed for pain        amoxicillin 500 MG tablet    AMOXIL    4 tablet    Take 2000 mg 60 min prior to dental work    Preventive antibiotic       aspirin 81 MG tablet      Take 81 mg by mouth every morning        atorvastatin 40 MG tablet    LIPITOR    90 tablet    Take 1 tablet (40 mg) by mouth daily    Hyperlipidemia with target LDL less than 130       busPIRone 10 MG tablet    BUSPAR     Take by mouth 3 times daily 20 mg every morning, 10 mg every afternoon, 20 mg HS        diclofenac 1 % Gel topical gel    VOLTAREN    100 g    Place onto the skin as needed for moderate pain Patient uses prn on hands for playing [employer].    Arthritis of both hands       DOXEPIN HCL PO      Take by mouth At Bedtime Patient states that he takes 1.25 mL HS (12.5mg/1.25mL).        terbinafine 250 MG tablet    lamISIL     Take 1 tablet by mouth daily

## 2018-04-03 NOTE — NURSING NOTE
"Chief Complaint   Patient presents with     Musculoskeletal Problem       Initial /72  Pulse 51  Temp 97.6  F (36.4  C) (Tympanic)  Resp 14  Wt 170 lb (77.1 kg)  SpO2 100%  BMI 24.39 kg/m2 Estimated body mass index is 24.39 kg/(m^2) as calculated from the following:    Height as of 10/25/17: 5' 10\" (1.778 m).    Weight as of this encounter: 170 lb (77.1 kg).  Medication Reconciliation: complete    "

## 2018-04-05 ENCOUNTER — MYC MEDICAL ADVICE (OUTPATIENT)
Dept: FAMILY MEDICINE | Facility: CLINIC | Age: 73
End: 2018-04-05

## 2018-04-05 DIAGNOSIS — M67.88 ACHILLES TENDINOSIS: Primary | ICD-10-CM

## 2018-04-05 NOTE — TELEPHONE ENCOUNTER
Faxed referral to [facility] at [phone] &[phone]. Sent [facility] scheduling phone number to pt via my chart.

## 2018-04-05 NOTE — TELEPHONE ENCOUNTER
I have generated a generic physical therapy referral. Please send it to [facility] in [place].

## 2018-04-16 ENCOUNTER — THERAPY VISIT (OUTPATIENT)
Dept: PHYSICAL THERAPY | Facility: CLINIC | Age: 73
End: 2018-04-16
Payer: MEDICARE

## 2018-04-16 DIAGNOSIS — M67.88 ACHILLES TENDINOSIS OF LEFT LOWER EXTREMITY: Primary | ICD-10-CM

## 2018-04-16 PROCEDURE — 97110 THERAPEUTIC EXERCISES: CPT | Mod: GP | Performed by: PHYSICAL THERAPIST

## 2018-04-16 PROCEDURE — 97161 PT EVAL LOW COMPLEX 20 MIN: CPT | Mod: GP | Performed by: PHYSICAL THERAPIST

## 2018-04-16 PROCEDURE — G8978 MOBILITY CURRENT STATUS: HCPCS | Mod: GP | Performed by: PHYSICAL THERAPIST

## 2018-04-16 PROCEDURE — 97140 MANUAL THERAPY 1/> REGIONS: CPT | Mod: GP | Performed by: PHYSICAL THERAPIST

## 2018-04-16 PROCEDURE — G8979 MOBILITY GOAL STATUS: HCPCS | Mod: GP | Performed by: PHYSICAL THERAPIST

## 2018-04-16 NOTE — LETTER
DEPARTMENT OF HEALTH AND HUMAN SERVICES  CENTERS FOR MEDICARE & MEDICAID SERVICES    PLAN/UPDATED PLAN OF PROGRESS FOR OUTPATIENT REHABILITATION    PATIENTS NAME:  Chester Palacios   : 1945  PROVIDER NUMBER:    [id number]    [id number]     PROVIDER NAME: [place] [facility]    MEDICAL RECORD NUMBER: [id number]     START OF CARE DATE:  SOC Date: 18   TYPE:  PT    PRIMARY/TREATMENT DIAGNOSIS: (Pertinent Medical Diagnosis)  Achilles tendinosis of left lower extremity    VISITS FROM START OF CARE:  Rxs Used: 1     [facility] Initial Evaluation    Subjective:  Patient is a 72 year old male presenting with rehab left ankle/foot hpi. The history is provided by the patient. No  was used.   Chester Palacios is a 72 year old male with a right ankle (right calf) condition.  Occurance: unsure, fast movement with tennis.  Condition occurred: during recreation/sport.  This is a new condition  On or about 3/15/2018, I was playing tennis.  I tried to move quickly to a new point.  I felt a sharp pain in the left calf.  I iced and took medication. I was given exercises and they bothered me.  I saw a new MD who put me in a Low Profile boot, heel wedge and I feel a little better.  I am going to be out of town for a week.   Patient reports pain:  Posterior (posterior calf).  Radiates to:  Lower leg.  Pain is described as sharp and is intermittent and reported as 4/10.  Associated with: none. Pain is worse in the P.M..  Symptoms are exacerbated by descending stairs, walking and bending/squatting and relieved by ice, NSAID's and rest.  Since onset symptoms are unchanged.  Special tests:  X-ray (normal).  Previous treatment includes physical therapy (right shoulder pain in the past).    General health as reported by patient is good.  Pertinent medical history includes:  Osteoarthritis, history of fractures and depression.  Medical  allergies: yes.  Other surgeries include:  Orthopedic surgery and other (right HOLLY, pin in right elbow, tonsils).  Current medications:  Pain medication, sleep medication and anti-depressants.  Current occupation is Retired,  bicyclist.  Employment status: retired.  Employment tasks: grand children, tennis.  Barriers include:  Stairs (house, multilevel).  Red flags:  None as reported by patient.               Objective:  Standing Alignment:    Cervical/Thoracic:  Forward head  Shoulder/UE:  Rounded shoulders (internal rotation of bilateral shoulders)  Lumbar:  Lordosis decr (forward lean)  Ankle/Foot:  Calcaneal valgus R  PATIENTS NAME:  Chester Palacios   : 1945    Gait:    Deviations:  Lumbar:  Trunk flexionAnkle:  Push off decr LGeneral Deviations:  Stance time decr  Flexibility/Screens:   Lower Extremity:  Decreased left lower extremity flexibility:Hip Flexors; Quadriceps; Hamstrings; Gastroc and Soleus  Decreased right lower extremity flexibility:  Hip Flexors; Quadriceps; Hamstrings; Gastroc and Soleus  Spine:  Decreased left spine flexibility:  Quadratus Lumborum  Decreased right spine flexibility:  Quadratus Lumborum    Ankle/Foot Evaluation  ROM:    AROM:    Dorsiflexion:  Left:   15  Right:   10  Plantarflexion:  Left:  45 with pain    Right:  55  Inversion:  Left:  30     Right:  35  Eversion:  25     Right:  25  PROM:    Pain: TROM flexion to mud shin, extension minimal movement, decrease in hip extension/abduction bilaterally   Strength:    Dorsiflexion:  Left: 5/5     Pain:   Right: 5/5   Pain:  Plantarflexion: Left: 3+/5    Pain:+   Right: 5/5  Pain:  Inversion:Left: 5-/5  Pain:     Right: 5/5  Pain:  Eversion:Left: 5/5  Pain:  Right: 5/5  Pain:  Anterior Tibialis:Left: 5/5  Pain:  Right: 5/5  Pain:  Posterior Tibialis: Left: 4+/5  Pain:    Peroneals: Left: 5/5  Pain:  Right: 5/5  Pain:  Extensor Digitorum: Left: 5/5  Pain:Right: 5/5  Pain:  PALPATION: Palpation of ankle:  plantaris muscle    Left ankle tenderness present at:  gastroc/soleus; achilles tendon and posterior tibialis  Right ankle tenderness present at:   posterior tibialis  EDEMA: Edema ankle: atrophy medical gastroc.      MOBILITY TESTING:   Talocrural Left: hypomobile      FUNCTIONAL TESTS:   Proprioception:  Stork Balance Test: Left: 5  Right: 10       Assessment/Plan:    Patient is a 72 year old male with left calf complaints.    Patient has the following significant findings with corresponding treatment plan.                Diagnosis 1:  Left calf pain/left achilles tendonosis  Pain -  hot/cold therapy, manual therapy, splint/taping/bracing/orthotics, self management, education and home program  Decreased ROM/flexibility - manual therapy, therapeutic exercise, therapeutic activity and home program  Decreased joint mobility - manual therapy, therapeutic exercise, therapeutic activity and home program  PATIENTS NAME:  Chester Palacios   : 1945    Decreased strength - therapeutic exercise, therapeutic activities and home program  Impaired balance - neuro re-education, gait training, therapeutic activities and home program  Impaired gait - gait training and home program  Impaired muscle performance - neuro re-education and home program  Decreased function - therapeutic activities and home program  Impaired posture - neuro re-education, therapeutic activities and home program  Evaluation ongoing.    Therapy Evaluation Codes:   1) History comprised of:   Personal factors that impact the plan of care:      Anxiety.    Comorbidity factors that impact the plan of care are:      Depression, Implanted device, Osteoarthritis and right HOLLY.     Medications impacting care: Anti-depressant, Pain and Sleep.  2) Examination of Body Systems comprised of:   Body structures and functions that impact the plan of care:      Ankle and calf.   Activity limitations that impact the plan of care are:      Dressing, Jumping, Lifting, Running, Sports,  "Squatting/kneeling, Stairs, Standing and Walking.  3) Clinical presentation characteristics are:   Stable/Uncomplicated.  4) Decision-Making    Low complexity using standardized patient assessment instrument and/or measureable assessment of functional outcome.  Cumulative Therapy Evaluation is: Low complexity.    Previous and current functional limitations:  (See Goal Flow Sheet for this information)    Short term and Long term goals: (See Goal Flow Sheet for this information)     Communication ability:  Patient appears to be able to clearly communicate and understand verbal and written communication and follow directions correctly.  Treatment Explanation - The following has been discussed with the patient:   RX ordered/plan of care  Possible risks and side effects  This patient would benefit from PT intervention to resume normal activities.   Rehab potential is good.    Frequency:  1 X week, once daily  Duration:  for 8 weeks  Discharge Plan:  Achieve all LTG.  Independent in home treatment program.    Caregiver Signature/Credentials _____________________________ Date ________       Treating Provider: Mary Casas, PT     I have reviewed and certified the need for these services and plan of treatment while under my care.  PATIENTS NAME:  Chester Palacios   : 1945      PHYSICIAN'S SIGNATURE:   _________________________________________  Date___________   Dariel Jack MD    Certification period:  Beginning of Cert date period: 18 to  End of Cert period date: 18     Functional Level Progress Report: Please see attached \"Goal Flow sheet for Functional level.\"    ____X____ Continue Services or       ________ DC Services                Service dates: From  SOC Date: 18 date to present                         "

## 2018-04-16 NOTE — MR AVS SNAPSHOT
After Visit Summary   4/16/2018    Chester Palacios    MRN: [id number]           Patient Information     Date Of Birth          1945        Visit Information        Provider Department      4/16/2018 11:00 AM Mary Hernadez, PT [facility] Athletic Medicine [facility] Physical Therapy        Today's Diagnoses     Achilles tendinosis of left lower extremity    -  1       Follow-ups after your visit        Your next 10 appointments already scheduled     Apr 26, 2018  2:30 PM CDT   RENEE Extremity with Meri Bond PT   [facility] Athletic Medicine [facility] Physical Therapy ([facility]  )    [address]  [facility] [address]   [phone]            May 01, 2018 12:30 PM CDT   RENEE Extremity with Yessi David PT   [facility] Athletic [facility] Physical Therapy ([facility]  )    [address]  [facility] [address]   [phone]            May 04, 2018  1:30 PM CDT   (Arrive by 1:15 PM)   Return Visit with Rosa Macias MD   [facility] Pro[facility] Urologic Cancers ([facility])    [address] [place] [id number] [phone]            May 15, 2018 11:20 AM CDT   RENEE Extremity with Yessi David PT   [facility] Athletic [facility] Physical Therapy ([facility]  )    [address]  [facility] [address]   [phone]              Who to contact     If you have questions or need follow up information about today's clinic visit or your schedule please contact [place] [facility] PHYSICAL THERAPY directly at [phone].  Normal or non-critical lab and imaging results will be communicated to you by MyChart, letter or phone within 4 business days after the clinic has received the results. If you do not hear from us within 7 days, please contact the clinic through MyChart or phone. If you have a  critical or abnormal lab result, we will notify you by phone as soon as possible.  Submit refill requests through [employer] or call your pharmacy and they will forward the refill request to us. Please allow 3 business days for your refill to be completed.          Additional Information About Your Visit        MyChart Information     [employer] gives you secure access to your electronic health record. If you see a primary care provider, you can also send messages to your care team and make appointments. If you have questions, please call your primary care clinic.  If you do not have a primary care provider, please call [phone] and they will assist you.        Care EveryWhere ID     This is your Care EveryWhere ID. This could be used by other organizations to access your [facility] medical records  [id number]         Blood Pressure from Last 3 Encounters:   04/03/18 114/72   10/25/17 109/60   10/09/17 130/76    Weight from Last 3 Encounters:   04/03/18 77.1 kg (170 lb)   10/25/17 76.2 kg (168 lb)   10/09/17 76.2 kg (168 lb)              We Performed the Following     RENEE CERT REPORT     RENEE Inital Eval Report     Manual Ther Tech, 1+Regions, EA 15 min     PT Eval, Low Complexity (92045)     Therapeutic Exercises        Primary Care Provider Office Phone # Fax #    Dariel Jack [phone] [address] [facility] [address]        Equal Access to Services     SOHAM THORNTON AH: Hadii aad ku hadasho Soomaali, waaxda luqadaha, qaybta kaalmada adeegyada, waxay lashaun haygrace morelos . So [facility] [phone].    ATENCIÓN: Si habla español, tiene a sepulveda disposición servicios gratuitos de asistencia lingüística. Llame al [phone].    We comply with applicable federal civil rights laws and [place] laws. We do not discriminate on the basis of race, color, national origin, age, disability, sex, sexual orientation, or gender identity.            Thank you!     Thank you for choosing  [place] [facility] PHYSICAL THERAPY  for your care. Our goal is always to provide you with excellent care. Hearing back from our patients is one way we can continue to improve our services. Please take a few minutes to complete the written survey that you may receive in the mail after your visit with us. Thank you!             Your Updated Medication List - Protect others around you: Learn how to safely use, store and throw away your medicines at www.disposemymeds.org.          This list is accurate as of 4/16/18  8:53 PM.  Always use your most recent med list.                   Brand Name Dispense Instructions for use Diagnosis    ACETAMINOPHEN PO      Take 650 mg by mouth every 4 hours as needed for pain        amoxicillin 500 MG tablet    AMOXIL    4 tablet    Take 2000 mg 60 min prior to dental work    Preventive antibiotic       aspirin 81 MG tablet      Take 81 mg by mouth every morning        atorvastatin 40 MG tablet    LIPITOR    90 tablet    Take 1 tablet (40 mg) by mouth daily    Hyperlipidemia with target LDL less than 130       busPIRone 10 MG tablet    BUSPAR     Take by mouth 3 times daily 20 mg every morning, 10 mg every afternoon, 20 mg HS        diclofenac 1 % Gel topical gel    VOLTAREN    100 g    Place onto the skin as needed for moderate pain Patient uses prn on hands for playing [employer].    Arthritis of both hands       DOXEPIN HCL PO      Take by mouth At Bedtime Patient states that he takes 1.25 mL HS (12.5mg/1.25mL).        terbinafine 250 MG tablet    lamISIL     Take 1 tablet by mouth daily

## 2018-04-16 NOTE — PROGRESS NOTES
[place] [facility] Initial Evaluation  Subjective:  Patient is a 72 year old male presenting with rehab left ankle/foot hpi. The history is provided by the patient. No  was used.   Chester Palacios is a 72 year old male with a right ankle (right calf) condition.  Occurance: unsure, fast movement with tennis.  Condition occurred: during recreation/sport.  This is a new condition  On or about 3/15/2018, I was playing tennis.  I tried to move quickly to a new point.  I felt a sharp pain in the left calf.  I iced and took medication. I was given exercises and they bothered me.  I saw a new MD who put me in a Low Profile boot, heel wedge and I feel a little better.  I am going to be out of town for a week..    Patient reports pain:  Posterior (posterior calf).  Radiates to:  Lower leg.  Pain is described as sharp and is intermittent and reported as 4/10.  Associated with: none. Pain is worse in the P.M..  Symptoms are exacerbated by descending stairs, walking and bending/squatting and relieved by ice, NSAID's and rest.  Since onset symptoms are unchanged.  Special tests:  X-ray (normal).  Previous treatment includes physical therapy (right shoulder pain in the past).    General health as reported by patient is good.  Pertinent medical history includes:  Osteoarthritis, history of fractures and depression.  Medical allergies: yes.  Other surgeries include:  Orthopedic surgery and other (right HOLLY, pin in right elbow, tonsils).  Current medications:  Pain medication, sleep medication and anti-depressants.  Current occupation is Retired,  bicyclist.  Employment status: retired.  Employment tasks: grand children, tennis.    Barriers include:  Stairs (house, multilevel).    Red flags:  None as reported by patient.                        Objective:  Standing Alignment:    Cervical/Thoracic:  Forward head  Shoulder/UE:  Rounded shoulders (internal rotation of bilateral  shoulders)  Lumbar:  Lordosis decr (forward lean)        Ankle/Foot:  Calcaneal valgus R    Gait:      Deviations:  Lumbar:  Trunk flexionAnkle:  Push off decr LGeneral Deviations:  Stance time decr    Flexibility/Screens:       Lower Extremity:  Decreased left lower extremity flexibility:Hip Flexors; Quadriceps; Hamstrings; Gastroc and Soleus    Decreased right lower extremity flexibility:  Hip Flexors; Quadriceps; Hamstrings; Gastroc and Soleus  Spine:  Decreased left spine flexibility:  Quadratus Lumborum    Decreased right spine flexibility:  Quadratus Lumborum        Ankle/Foot Evaluation  ROM:    AROM:    Dorsiflexion:  Left:   15  Right:   10  Plantarflexion:  Left:  45 with pain    Right:  55  Inversion:  Left:  30     Right:  35  Eversion:  25     Right:  25      PROM:                Pain: TROM flexion to mud shin, extension minimal movement, decrease in hip extension/abduction bilaterally     Strength:    Dorsiflexion:  Left: 5/5     Pain:   Right: 5/5   Pain:  Plantarflexion: Left: 3+/5    Pain:+   Right: 5/5  Pain:  Inversion:Left: 5-/5  Pain:     Right: 5/5  Pain:  Eversion:Left: 5/5  Pain:  Right: 5/5  Pain:      Anterior Tibialis:Left: 5/5  Pain:  Right: 5/5  Pain:  Posterior Tibialis: Left: 4+/5  Pain:    Peroneals: Left: 5/5  Pain:  Right: 5/5  Pain:  Extensor Digitorum: Left: 5/5  Pain:Right: 5/5  Pain:          PALPATION: Palpation of ankle:  plantaris muscle   Left ankle tenderness present at:  gastroc/soleus; achilles tendon and posterior tibialis  Right ankle tenderness present at:   posterior tibialis  EDEMA: Edema ankle: atrophy medical gastroc.          MOBILITY TESTING:       Talocrural Left: hypomobile            FUNCTIONAL TESTS:           Proprioception:  Stork Balance Test: Left: 5  Right: 10                                                     General     ROS    Assessment/Plan:    Patient is a 72 year old male with left calf complaints.    Patient has the following significant findings  with corresponding treatment plan.                Diagnosis 1:  Left calf pain/left achilles tendonosis  Pain -  hot/cold therapy, manual therapy, splint/taping/bracing/orthotics, self management, education and home program  Decreased ROM/flexibility - manual therapy, therapeutic exercise, therapeutic activity and home program  Decreased joint mobility - manual therapy, therapeutic exercise, therapeutic activity and home program  Decreased strength - therapeutic exercise, therapeutic activities and home program  Impaired balance - neuro re-education, gait training, therapeutic activities and home program  Impaired gait - gait training and home program  Impaired muscle performance - neuro re-education and home program  Decreased function - therapeutic activities and home program  Impaired posture - neuro re-education, therapeutic activities and home program  Evaluation ongoing.    Therapy Evaluation Codes:   1) History comprised of:   Personal factors that impact the plan of care:      Anxiety.    Comorbidity factors that impact the plan of care are:      Depression, Implanted device, Osteoarthritis and right HOLLY.     Medications impacting care: Anti-depressant, Pain and Sleep.  2) Examination of Body Systems comprised of:   Body structures and functions that impact the plan of care:      Ankle and calf.   Activity limitations that impact the plan of care are:      Dressing, Jumping, Lifting, Running, Sports, Squatting/kneeling, Stairs, Standing and Walking.  3) Clinical presentation characteristics are:   Stable/Uncomplicated.  4) Decision-Making    Low complexity using standardized patient assessment instrument and/or measureable assessment of functional outcome.  Cumulative Therapy Evaluation is: Low complexity.    Previous and current functional limitations:  (See Goal Flow Sheet for this information)    Short term and Long term goals: (See Goal Flow Sheet for this information)     Communication ability:  Patient  appears to be able to clearly communicate and understand verbal and written communication and follow directions correctly.  Treatment Explanation - The following has been discussed with the patient:   RX ordered/plan of care  Possible risks and side effects  This patient would benefit from PT intervention to resume normal activities.   Rehab potential is good.    Frequency:  1 X week, once daily  Duration:  for 8 weeks  Discharge Plan:  Achieve all LTG.  Independent in home treatment program.    Please refer to the daily flowsheet for treatment today, total treatment time and time spent performing 1:1 timed codes.

## 2018-04-25 ENCOUNTER — PRE VISIT (OUTPATIENT)
Dept: UROLOGY | Facility: CLINIC | Age: 73
End: 2018-04-25

## 2018-04-25 NOTE — TELEPHONE ENCOUNTER
Patient is coming in to see  for Flow/PVR, called patient and left a message to please come to appointment with a full bladder for a flow/PVR

## 2018-04-26 ENCOUNTER — THERAPY VISIT (OUTPATIENT)
Dept: PHYSICAL THERAPY | Facility: CLINIC | Age: 73
End: 2018-04-26
Payer: MEDICARE

## 2018-04-26 DIAGNOSIS — M67.88 ACHILLES TENDINOSIS OF LEFT LOWER EXTREMITY: ICD-10-CM

## 2018-04-26 PROCEDURE — G8979 MOBILITY GOAL STATUS: HCPCS | Mod: GP

## 2018-04-26 PROCEDURE — 97140 MANUAL THERAPY 1/> REGIONS: CPT | Mod: GP | Performed by: PHYSICAL THERAPIST

## 2018-04-26 PROCEDURE — G8978 MOBILITY CURRENT STATUS: HCPCS | Mod: GP

## 2018-04-26 PROCEDURE — 97110 THERAPEUTIC EXERCISES: CPT | Mod: GP | Performed by: PHYSICAL THERAPIST

## 2018-04-30 ENCOUNTER — TELEPHONE (OUTPATIENT)
Dept: FAMILY MEDICINE | Facility: CLINIC | Age: 73
End: 2018-04-30

## 2018-04-30 NOTE — TELEPHONE ENCOUNTER
Reason for Call:  Form, our goal is to have forms completed with 72 hours, however, some forms may require a visit or additional information.    Type of letter, form or note:  medical    Who is the form from?: Home care    Where did the form come from: form was faxed in    What clinic location was the form placed at?: [facility]    Where the form was placed: 's Box: Dariel Jack MD    What number is listed as a contact on the form?: [phone]  Phone [phone]       Additional comments: CORNELL RENEE out pt rehab    Call taken on 4/30/2018 at 3:11 PM by Anjana Pandya

## 2018-05-01 ENCOUNTER — THERAPY VISIT (OUTPATIENT)
Dept: PHYSICAL THERAPY | Facility: CLINIC | Age: 73
End: 2018-05-01
Payer: MEDICARE

## 2018-05-01 DIAGNOSIS — M67.88 ACHILLES TENDINOSIS OF LEFT LOWER EXTREMITY: ICD-10-CM

## 2018-05-01 PROCEDURE — G8979 MOBILITY GOAL STATUS: HCPCS | Mod: GP

## 2018-05-01 PROCEDURE — G8978 MOBILITY CURRENT STATUS: HCPCS | Mod: GP

## 2018-05-01 PROCEDURE — 97110 THERAPEUTIC EXERCISES: CPT | Mod: GP | Performed by: PHYSICAL THERAPIST

## 2018-05-01 PROCEDURE — 97112 NEUROMUSCULAR REEDUCATION: CPT | Mod: GP | Performed by: PHYSICAL THERAPIST

## 2018-05-04 ENCOUNTER — OFFICE VISIT (OUTPATIENT)
Dept: UROLOGY | Facility: CLINIC | Age: 73
End: 2018-05-04
Payer: COMMERCIAL

## 2018-05-04 VITALS
DIASTOLIC BLOOD PRESSURE: 74 MMHG | BODY MASS INDEX: 23.05 KG/M2 | WEIGHT: 161 LBS | SYSTOLIC BLOOD PRESSURE: 126 MMHG | HEART RATE: 51 BPM | HEIGHT: 70 IN

## 2018-05-04 DIAGNOSIS — R35.1 BENIGN PROSTATIC HYPERPLASIA WITH NOCTURIA: Primary | ICD-10-CM

## 2018-05-04 DIAGNOSIS — N40.1 BENIGN PROSTATIC HYPERPLASIA WITH NOCTURIA: Primary | ICD-10-CM

## 2018-05-04 ASSESSMENT — PAIN SCALES - GENERAL: PAINLEVEL: NO PAIN (0)

## 2018-05-04 NOTE — PATIENT INSTRUCTIONS
Please follow up in a year.    It was a pleasure meeting with you today.  Thank you for allowing me and my team the privilege of caring for you today.  YOU are the reason we are here, and I truly hope we provided you with the excellent service you deserve.  Please let us know if there is anything else we can do for you so that we can be sure you are leaving completely satisfied with your care experience.

## 2018-05-04 NOTE — PROGRESS NOTES
Office Visit Note      UROLOGIC DIAGNOSES:       CURRENT INTERVENTIONS:       HISTORY:   Patient presents for follow up for lower urinary tract symptoms.   He is s/p TUNA ~ ten years ago.   Now currently s/p TURP for lower urinary tract symptoms.   Post op course complicated by UTI.   Patient voiding better.   Patient currently notes that urgency has improved since last visit.  Sleeping better as nocturia has improved though continues to wake 2-4x per night.     Patient now states that he is pleased with his symptoms. Further improvement in nocturia noted and urgency has improved since above as well.     We reviewed symptoms, follow up schedule, PVR.           PAST MEDICAL HISTORY:   Past Medical History:   Diagnosis Date     Anxiety state 10/26/2012    Works with psychiatrist      Arthritis of right hip 12/21/2015     Coronary artery calcification seen on computed tomography 12/12/2016    See Cardiology consult 1/17; cardiac MRI shows EF 60%, no scar       Family history of hemochromatosis 10/25/2015    Brother; Chester had nml labs in 2010      Hyperlipidemia with target LDL less than 130 10/26/2012     Diagnosis updated by automated process. Provider to review and confirm.     Lower urinary tract symptoms (LUTS) 11/5/2016    See urology consult 10/16       Osteoarthritis, unspecified osteoarthritis type, unspecified site 11/7/2016       PAST SURGICAL HISTORY:   Past Surgical History:   Procedure Laterality Date     CYSTOSCOPY, TRANSURETHRAL RESECTION (TUR) PROSTATE, COMBINED N/A 3/8/2017    Procedure: COMBINED CYSTOSCOPY, TRANSURETHRAL RESECTION (TUR) PROSTATE;  Surgeon: Rosa Macias MD;  Location: [facility] OR     HERNIA REPAIR  1985    Inguinal     JOINT REPLACEMENT, HIP RT/LT Right 1/16    Joint Replacement Hip RT/LT     ORTHOPEDIC SURGERY      R elbow Orif     PROSTATE SURGERY  2007    TUNA for BPH     TONSILLECTOMY      age 5       FAMILY HISTORY:   Family History   Problem Relation Age of Onset      "HEART DISEASE Father       age 56; had chest pain,  in the hospital soon after; was a smoker     Genetic Disorder Brother      +HFE gene mutation, but does not have hemochromatosis, but has phlebotomies     Blood Disease Mother      hemolytic anemia;  age 85       SOCIAL HISTORY:   Social History   Substance Use Topics     Smoking status: Never Smoker     Smokeless tobacco: Never Used     Alcohol use Yes      Comment: occ.       Current Outpatient Prescriptions   Medication     aspirin 81 MG tablet     atorvastatin (LIPITOR) 40 MG tablet     busPIRone (BUSPAR) 10 MG tablet     diclofenac (VOLTAREN) 1 % GEL     DOXEPIN HCL PO     terbinafine (LAMISIL) 250 MG tablet     ACETAMINOPHEN PO     amoxicillin (AMOXIL) 500 MG tablet     No current facility-administered medications for this visit.          PHYSICAL EXAM:    /74  Pulse 51  Ht 1.778 m (5' 10\")  Wt 73 kg (161 lb)  BMI 23.1 kg/m2    HEENT: Normocephalic and atraumatic   Cardiac: Not done  Back/Flank: Not done  CNS/PNS: Not done  Respiratory: Normal non-labored breathing  Abdomen: Soft nontender and nondistended  Peripheral Vascular: Not done  Mental Status: Not done    Penis: Not done  Scrotal Skin: Not done  Testicles: Not done  Epididymis: Not done  Digital Rectal Exam:     Cystoscopy:           Imaging: None    Urinalysis: [facility] RESULTS:  Recent Labs   Lab Test  16   1020   COLOR  Yellow   APPEARANCE  Clear   URINEGLC  Negative   URINEBILI  Negative   URINEKETONE  Negative   SG  1.025   UBLD  Trace*   URINEPH  6.0   PROTEIN  Negative   UROBILINOGEN  0.2   NITRITE  Negative   LEUKEST  Negative   RBCU  O - 2   WBCU  O - 2       PSA: 1.33  Qmax 17.1ml/s   Post Void Residual:  27ml     Other labs: None today      IMPRESSION:  71 y/o M with lower urinary tract symptoms now status post TURP complicated by UTI now resolved     PLAN:  Uroflow/PVR in one year   Follow up in one year     Total Time: 15 minutes                                     " Total in Consultation: greater than 50%

## 2018-05-04 NOTE — LETTER
5/4/2018       RE: Chester Palacios  [address]  [facility] [address]     Dear Colleague,    Thank you for referring your patient, Chester Palacios, to the [facility] UROLOGY AND INST FOR PROSTATE AND UROLOGIC CANCERS at [facility]. Please see a copy of my visit note below.    Office Visit Note      UROLOGIC DIAGNOSES:       CURRENT INTERVENTIONS:       HISTORY:   Patient presents for follow up for lower urinary tract symptoms.   He is s/p TUNA ~ ten years ago.   Now currently s/p TURP for lower urinary tract symptoms.   Post op course complicated by UTI.   Patient voiding better.   Patient currently notes that urgency has improved since last visit.  Sleeping better as nocturia has improved though continues to wake 2-4x per night.     Patient now states that he is pleased with his symptoms. Further improvement in nocturia noted and urgency has improved since above as well.     We reviewed symptoms, follow up schedule, PVR.           PAST MEDICAL HISTORY:   Past Medical History:   Diagnosis Date     Anxiety state 10/26/2012    Works with psychiatrist      Arthritis of right hip 12/21/2015     Coronary artery calcification seen on computed tomography 12/12/2016    See Cardiology consult 1/17; cardiac MRI shows EF 60%, no scar       Family history of hemochromatosis 10/25/2015    Brother; Chester had nml labs in 2010      Hyperlipidemia with target LDL less than 130 10/26/2012     Diagnosis updated by automated process. Provider to review and confirm.     Lower urinary tract symptoms (LUTS) 11/5/2016    See urology consult 10/16       Osteoarthritis, unspecified osteoarthritis type, unspecified site 11/7/2016       PAST SURGICAL HISTORY:   Past Surgical History:   Procedure Laterality Date     CYSTOSCOPY, TRANSURETHRAL RESECTION (TUR) PROSTATE, COMBINED N/A 3/8/2017    Procedure: COMBINED CYSTOSCOPY, TRANSURETHRAL RESECTION (TUR) PROSTATE;  Surgeon: Rosa Macias  "MD Marcia;  Location: [facility] OR     HERNIA REPAIR      Inguinal     JOINT REPLACEMENT, HIP RT/LT Right     Joint Replacement Hip RT/LT     ORTHOPEDIC SURGERY      R elbow Orif     PROSTATE SURGERY      TUNA for BPH     TONSILLECTOMY      age 5       FAMILY HISTORY:   Family History   Problem Relation Age of Onset     HEART DISEASE Father       age 56; had chest pain,  in the hospital soon after; was a smoker     Genetic Disorder Brother      +HFE gene mutation, but does not have hemochromatosis, but has phlebotomies     Blood Disease Mother      hemolytic anemia;  age 85       SOCIAL HISTORY:   Social History   Substance Use Topics     Smoking status: Never Smoker     Smokeless tobacco: Never Used     Alcohol use Yes      Comment: occ.       Current Outpatient Prescriptions   Medication     aspirin 81 MG tablet     atorvastatin (LIPITOR) 40 MG tablet     busPIRone (BUSPAR) 10 MG tablet     diclofenac (VOLTAREN) 1 % GEL     DOXEPIN HCL PO     terbinafine (LAMISIL) 250 MG tablet     ACETAMINOPHEN PO     amoxicillin (AMOXIL) 500 MG tablet     No current facility-administered medications for this visit.          PHYSICAL EXAM:    /74  Pulse 51  Ht 1.778 m (5' 10\")  Wt 73 kg (161 lb)  BMI 23.1 kg/m2    HEENT: Normocephalic and atraumatic   Cardiac: Not done  Back/Flank: Not done  CNS/PNS: Not done  Respiratory: Normal non-labored breathing  Abdomen: Soft nontender and nondistended  Peripheral Vascular: Not done  Mental Status: Not done    Penis: Not done  Scrotal Skin: Not done  Testicles: Not done  Epididymis: Not done  Digital Rectal Exam:     Cystoscopy:           Imaging: None    Urinalysis: [facility] RESULTS:  Recent Labs   Lab Test  16   1020   COLOR  Yellow   APPEARANCE  Clear   URINEGLC  Negative   URINEBILI  Negative   URINEKETONE  Negative   SG  1.025   UBLD  Trace*   URINEPH  6.0   PROTEIN  Negative   UROBILINOGEN  0.2   NITRITE  Negative   LEUKEST  Negative   RBCU  O - 2 "   WBCU  O - 2       PSA: 1.33  Qmax 17.1ml/s   Post Void Residual:  27ml     Other labs: None today      IMPRESSION:  71 y/o M with lower urinary tract symptoms now status post TURP complicated by UTI now resolved     PLAN:  Uroflow/PVR in one year   Follow up in one year     Total Time: 15 minutes                                     Total in Consultation: greater than 50%                         Again, thank you for allowing me to participate in the care of your patient.      Sincerely,    Rosa Macias MD

## 2018-05-04 NOTE — MR AVS SNAPSHOT
After Visit Summary   5/4/2018    Chester Palacios    MRN: [id number]           Patient Information     Date Of Birth          1945        Visit Information        Provider Department      5/4/2018 1:30 PM Rosa Macias MD [facility] Urology and [facility] for Prostate and Urologic Cancers        Today's Diagnoses     Benign prostatic hyperplasia with nocturia    -  1      Care Instructions    Please follow up in a year.    It was a pleasure meeting with you today.  Thank you for allowing me and my team the privilege of caring for you today.  YOU are the reason we are here, and I truly hope we provided you with the excellent service you deserve.  Please let us know if there is anything else we can do for you so that we can be sure you are leaving completely satisfied with your care experience.                Follow-ups after your visit        Your next 10 appointments already scheduled     May 15, 2018 11:20 AM CDT   RENEE Extremity with Yessi David PT   [facility] ([facility]  )    [address]  [facility] [id number] [phone]            May 29, 2018  1:10 PM CDT   RENEE Extremity with Yessi David PT   [facility] ([facility]  )    [address]  [facility] [id number] [phone]              Who to contact     Please call your clinic at [phone] to:    Ask questions about your health    Make or cancel appointments    Discuss your medicines    Learn about your test results    Speak to your doctor            Additional Information About Your Visit        Ekohart Information     invino gives you secure access to your electronic health record. If you see a primary care provider, you can also send messages to your care team and make appointments. If you have questions, please call your primary care clinic.  If you do not have a primary care provider,  "please call [phone] and they will assist you.      Link_A_ Media is an electronic gateway that provides easy, online access to your medical records. With Link_[employer]_ [employer], you can request a clinic appointment, read your test results, renew a prescription or communicate with your care team.     To access your existing account, please contact your [facility] or call [phone] for assistance.        Care EveryWhere ID     This is your Care EveryWhere ID. This could be used by other organizations to access your [place] medical records  [id number]        Your Vitals Were     Pulse Height BMI (Body Mass Index)             51 1.778 m (5' 10\") 23.1 kg/m2          Blood Pressure from Last 3 Encounters:   05/04/18 126/74   04/03/18 114/72   10/25/17 109/60    Weight from Last 3 Encounters:   05/04/18 73 kg (161 lb)   04/03/18 77.1 kg (170 lb)   10/25/17 76.2 kg (168 lb)              We Performed the Following     POST-VOID RESIDUAL BLADDER SCAN        Primary Care Provider Office Phone # Fax #    Dariel Jack [phone]       [address] [facility] [address]        Equal Access to Services     SOHAM THORNTON : Hadii aad ku hadasho Soomaali, waaxda luqadaha, qaybta kaalmada adeegyada, waxay willyin hayradhamesn sohan morelos . So [facility] [phone].    ATENCIÓN: Si habla español, tiene a sepulveda disposición servicios gratuitos de asistencia lingüística. Llame al [phone].    We comply with applicable federal civil rights laws and [place] laws. We do not discriminate on the basis of race, color, national origin, age, disability, sex, sexual orientation, or gender identity.            Thank you!     Thank you for choosing [facility] UROLOGY AND [facility] FOR PROSTATE AND UROLOGIC CANCERS  for your care. Our goal is always to provide you with excellent care. Hearing back from our patients is one way we can continue to improve our services. Please take a few minutes to complete " the written survey that you may receive in the mail after your visit with us. Thank you!             Your Updated Medication List - Protect others around you: Learn how to safely use, store and throw away your medicines at www.disposemymeds.org.          This list is accurate as of 5/4/18 11:59 PM.  Always use your most recent med list.                   Brand Name Dispense Instructions for use Diagnosis    ACETAMINOPHEN PO      Take 650 mg by mouth every 4 hours as needed for pain        amoxicillin 500 MG tablet    AMOXIL    4 tablet    Take 2000 mg 60 min prior to dental work    Preventive antibiotic       aspirin 81 MG tablet      Take 81 mg by mouth every morning        atorvastatin 40 MG tablet    LIPITOR    90 tablet    Take 1 tablet (40 mg) by mouth daily    Hyperlipidemia with target LDL less than 130       busPIRone 10 MG tablet    BUSPAR     Take by mouth 3 times daily 20 mg every morning, 10 mg every afternoon, 20 mg HS        diclofenac 1 % Gel topical gel    VOLTAREN    100 g    Place onto the skin as needed for moderate pain Patient uses prn on hands for playing [employer].    Arthritis of both hands       DOXEPIN HCL PO      Take by mouth At Bedtime Patient states that he takes 1.25 mL HS (12.5mg/1.25mL).        terbinafine 250 MG tablet    lamISIL     Take 1 tablet by mouth daily

## 2018-05-15 ENCOUNTER — THERAPY VISIT (OUTPATIENT)
Dept: PHYSICAL THERAPY | Facility: CLINIC | Age: 73
End: 2018-05-15
Payer: MEDICARE

## 2018-05-15 DIAGNOSIS — M67.88 ACHILLES TENDINOSIS OF LEFT LOWER EXTREMITY: ICD-10-CM

## 2018-05-15 PROCEDURE — G8978 MOBILITY CURRENT STATUS: HCPCS | Mod: GP

## 2018-05-15 PROCEDURE — 97110 THERAPEUTIC EXERCISES: CPT | Mod: GP | Performed by: PHYSICAL THERAPIST

## 2018-05-15 PROCEDURE — 97140 MANUAL THERAPY 1/> REGIONS: CPT | Mod: GP | Performed by: PHYSICAL THERAPIST

## 2018-05-15 PROCEDURE — G8979 MOBILITY GOAL STATUS: HCPCS | Mod: GP

## 2018-05-21 ENCOUNTER — TELEPHONE (OUTPATIENT)
Dept: FAMILY MEDICINE | Facility: CLINIC | Age: 73
End: 2018-05-21

## 2018-05-21 NOTE — TELEPHONE ENCOUNTER
Reason for Call:  Form, our goal is to have forms completed with 72 hours, however, some forms may require a visit or additional information.    Type of letter, form or note:  medical    Who is the form from?: Home care    Where did the form come from: form was faxed in    What clinic location was the form placed at?: [facility]    Where the form was placed: 's Box: Dariel Jack MD    What number is listed as a contact on the form?: [phone]  Phone  [phone]       Additional comments: [facility]  rehab outpt progress     Call taken on 5/21/2018 at 4:49 PM by Anjana Pandya

## 2018-05-24 ENCOUNTER — TELEPHONE (OUTPATIENT)
Dept: PHYSICAL THERAPY | Facility: CLINIC | Age: 73
End: 2018-05-24

## 2018-05-24 DIAGNOSIS — M67.88 ACHILLES TENDINOSIS OF LEFT LOWER EXTREMITY: ICD-10-CM

## 2018-05-24 NOTE — TELEPHONE ENCOUNTER
Patient called stating he was able to swim yesterday and walk around the house and yard without pain and without wearing the boot.  However in the middle of the night he was not able to sleep so he got up and went downstairs.  He noted that he had some pain going down the stairs.  We discussed that this may have occurred due to the large stretch and ankle ROM that is required for going down the stairs and that most likely the muscle was not warmed up due to just getting out of bed. This may have been the cause of the strain/pain.  We discussed having the boot next to the bed if needed in the middle of the night or trying to do some AROM before getting out of bed and minisquats before walking.  Try to continue to go without the boot today unless the pain continues to get worse.

## 2018-05-29 ENCOUNTER — THERAPY VISIT (OUTPATIENT)
Dept: PHYSICAL THERAPY | Facility: CLINIC | Age: 73
End: 2018-05-29
Payer: MEDICARE

## 2018-05-29 DIAGNOSIS — M67.88 ACHILLES TENDINOSIS OF LEFT LOWER EXTREMITY: ICD-10-CM

## 2018-05-29 PROCEDURE — G8978 MOBILITY CURRENT STATUS: HCPCS | Mod: GP

## 2018-05-29 PROCEDURE — 97140 MANUAL THERAPY 1/> REGIONS: CPT | Mod: GP | Performed by: PHYSICAL THERAPIST

## 2018-05-29 PROCEDURE — 97110 THERAPEUTIC EXERCISES: CPT | Mod: GP | Performed by: PHYSICAL THERAPIST

## 2018-05-29 PROCEDURE — G8979 MOBILITY GOAL STATUS: HCPCS | Mod: GP

## 2018-05-29 NOTE — PROGRESS NOTES
Subjective:  HPI                    Objective:  System    Physical Exam    General     ROS    Assessment/Plan:    PROGRESS  REPORT    Progress reporting period is from 4/16/18 to 5/29/18.       SUBJECTIVE  Subjective changes noted by patient: .  Subjective: Feeling better, was able to walk around the neighborhood and can bike w/o pain. Able to not wear the boot for most of the day, only wears it prophlactically.     Current pain level is 0/10  .     Previous pain level was  4/10  .   Changes in function:  Yes (See Goal flowsheet attached for changes in current functional level)  Adverse reaction to treatment or activity: None    OBJECTIVE  Changes noted in objective findings:  Yes,   Objective: Reviewed gastroc/soleus stretches and importance of stretching both legs. Progressed ex's.       ASSESSMENT/PLAN  Updated problem list and treatment plan: Diagnosis 1:  L achilles pain  Pain -  hot/cold therapy and manual therapy  Decreased ROM/flexibility - manual therapy and therapeutic exercise  Decreased joint mobility - manual therapy and therapeutic exercise  Decreased strength - therapeutic exercise and therapeutic activities  Decreased proprioception - neuro re-education and therapeutic activities  Impaired gait - gait training  Impaired muscle performance - neuro re-education  Decreased function - therapeutic activities  STG/LTGs have been met or progress has been made towards goals:  Yes (See Goal flow sheet completed today.)  Assessment of Progress: The patient's condition is improving.  Self Management Plans:  Patient is independent in a home treatment program.  I have re-evaluated this patient and find that the nature, scope, duration and intensity of the therapy is appropriate for the medical condition of the patient.  Chester continues to require the following intervention to meet STG and LTG's:  PT    Recommendations:  This patient would benefit from continued therapy.     Frequency:  1 X week, once  daily  Duration:  for 6 weeks        Please refer to the daily flowsheet for treatment today, total treatment time and time spent performing 1:1 timed codes.

## 2018-05-29 NOTE — MR AVS SNAPSHOT
After Visit Summary   5/29/2018    Chester Palacios    MRN: [id number]           Patient Information     Date Of Birth          1945        Visit Information        Provider Department      5/29/2018 1:10 PM Yessi David, PT [facility] Athletic Medicine [facility] Physical Therapy        Today's Diagnoses     Achilles tendinosis of left lower extremity           Follow-ups after your visit        Your next 10 appointments already scheduled     Jun 12, 2018 10:40 AM CDT   RENEE Extremity with Yessi David PT   [facility] Athletic [facility] ([facility]  )    [address]  [facility] [address]   [phone]            Jul 03, 2018  1:50 PM CDT   RENEE Extremity with Yessi David PT   [facility] Athletic [facility] ([facility]  )    [address]  [facility] [address]   [phone]              Who to contact     If you have questions or need follow up information about today's clinic visit or your schedule please contact [facility] PHYSICAL THERAPY directly at [phone].  Normal or non-critical lab and imaging results will be communicated to you by BioElectronicshart, letter or phone within 4 business days after the clinic has received the results. If you do not hear from us within 7 days, please contact the clinic through BioElectronicshart or phone. If you have a critical or abnormal lab result, we will notify you by phone as soon as possible.  Submit refill requests through Nos[employer] HQ or call your pharmacy and they will forward the refill request to us. Please allow 3 business days for your refill to be completed.          Additional Information About Your Visit        BioElectronicshart Information     [employer] HQ gives you secure access to your electronic health record. If you see a primary care provider, you can also send messages to your care team and make appointments. If you have questions,  please call your primary care clinic.  If you do not have a primary care provider, please call [phone] and they will assist you.        Care EveryWhere ID     This is your Care EveryWhere ID. This could be used by other organizations to access your [place] medical records  [id number]         Blood Pressure from Last 3 Encounters:   05/04/18 126/74   04/03/18 114/72   10/25/17 109/60    Weight from Last 3 Encounters:   05/04/18 73 kg (161 lb)   04/03/18 77.1 kg (170 lb)   10/25/17 76.2 kg (168 lb)              We Performed the Following     RENEE PROGRESS NOTES REPORT     MANUAL THER TECH,1+REGIONS,EA 15 MIN     THERAPEUTIC EXERCISES        Primary Care Provider Office Phone # Fax #    Dariel Jack [phone] [address] [facility] [address]        Equal Access to Services     MONIQUE [facility] Center[facility]ANTELL : Hadii aad ku hadasho Soomaali, waaxda luqadaha, qaybta kaalmada adeegyada, waxay lashaun quezadan sohan morelos . So [facility] [phone].    ATENCIÓN: Si habla español, tiene a sepulveda disposición servicios gratuitos de asistencia lingüística. LlRegency [facility] [phone].    We comply with applicable federal civil rights laws and [place] laws. We do not discriminate on the basis of race, color, national origin, age, disability, sex, sexual orientation, or gender identity.            Thank you!     Thank you for choosing [facility] PH[facility]ICAL THERAP[facility]  for your care. Our goal is always to provide you with excellent care. Hearing back from our patients is one way we can continue to improve our services. Please take a few minutes to complete the written survey that you may receive in the mail after your visit with us. Thank you!             Your Updated Medication List - Protect others around you: Learn how to safely use, store and throw away your medicines at www.disposemymeds.org.          This list is accurate as of 5/29/18  2:26 PM.  Always use your most recent med  list.                   Brand Name Dispense Instructions for use Diagnosis    ACETAMINOPHEN PO      Take 650 mg by mouth every 4 hours as needed for pain        amoxicillin 500 MG tablet    AMOXIL    4 tablet    Take 2000 mg 60 min prior to dental work    Preventive antibiotic       aspirin 81 MG tablet      Take 81 mg by mouth every morning        atorvastatin 40 MG tablet    LIPITOR    90 tablet    Take 1 tablet (40 mg) by mouth daily    Hyperlipidemia with target LDL less than 130       busPIRone 10 MG tablet    BUSPAR     Take by mouth 3 times daily 20 mg every morning, 10 mg every afternoon, 20 mg HS        diclofenac 1 % Gel topical gel    VOLTAREN    100 g    Place onto the skin as needed for moderate pain Patient uses prn on hands for playing [employer].    Arthritis of both hands       DOXEPIN HCL PO      Take by mouth At Bedtime Patient states that he takes 1.25 mL HS (12.5mg/1.25mL).        terbinafine 250 MG tablet    lamISIL     Take 1 tablet by mouth daily

## 2018-06-12 ENCOUNTER — THERAPY VISIT (OUTPATIENT)
Dept: PHYSICAL THERAPY | Facility: CLINIC | Age: 73
End: 2018-06-12
Payer: MEDICARE

## 2018-06-12 DIAGNOSIS — M67.88 ACHILLES TENDINOSIS OF LEFT LOWER EXTREMITY: ICD-10-CM

## 2018-06-12 PROCEDURE — 97140 MANUAL THERAPY 1/> REGIONS: CPT | Mod: GP | Performed by: PHYSICAL THERAPIST

## 2018-06-12 PROCEDURE — 97110 THERAPEUTIC EXERCISES: CPT | Mod: GP | Performed by: PHYSICAL THERAPIST

## 2018-07-03 ENCOUNTER — THERAPY VISIT (OUTPATIENT)
Dept: PHYSICAL THERAPY | Facility: CLINIC | Age: 73
End: 2018-07-03
Payer: MEDICARE

## 2018-07-03 DIAGNOSIS — M67.88 ACHILLES TENDINOSIS OF LEFT LOWER EXTREMITY: ICD-10-CM

## 2018-07-03 PROCEDURE — 97110 THERAPEUTIC EXERCISES: CPT | Mod: GP | Performed by: PHYSICAL THERAPIST

## 2018-08-06 ENCOUNTER — MYC MEDICAL ADVICE (OUTPATIENT)
Dept: FAMILY MEDICINE | Facility: CLINIC | Age: 73
End: 2018-08-06

## 2018-08-06 DIAGNOSIS — L98.9 SKIN LESION: Primary | ICD-10-CM

## 2018-08-06 NOTE — TELEPHONE ENCOUNTER
I have generated a referral. Please let the patient know the pertinent information (phone number to call etc)

## 2018-09-02 DIAGNOSIS — E78.5 HYPERLIPIDEMIA WITH TARGET LDL LESS THAN 130: ICD-10-CM

## 2018-09-04 NOTE — TELEPHONE ENCOUNTER
"Requested Prescriptions   Pending Prescriptions Disp Refills     atorvastatin (LIPITOR) 40 MG tablet [Pharmacy Med Name: ATORVASTATIN 40 MG TABLET]  Last Written Prescription Date:  3/27/2018  Last Fill Quantity: 90 tablet,  # refills: 1   Last Office Visit  4/3/2018        with  [facility], [facility] or [facility] prescribing provider:     Future Office Visit:        90 tablet 1     Sig: TAKE 1 TABLET (40 MG) BY MOUTH DAILY    Statins Protocol Passed    9/2/2018  8:58 AM       Passed - LDL on file in past 12 months    Recent Labs   Lab Test  10/09/17   1346   LDL  92            Passed - No abnormal creatine kinase in past 12 months    No lab results found.            Passed - Recent (12 mo) or future (30 days) visit within the authorizing provider's specialty    Patient had office visit in the last 12 months or has a visit in the next 30 days with authorizing provider or within the authorizing provider's specialty.  See \"Patient Info\" tab in inbasket, or \"Choose Columns\" in Meds & Orders section of the refill encounter.           Passed - Patient is age 18 or older          "

## 2018-09-05 RX ORDER — ATORVASTATIN CALCIUM 40 MG/1
TABLET, FILM COATED ORAL
Qty: 90 TABLET | Refills: 0 | Status: SHIPPED | OUTPATIENT
Start: 2018-09-05 | End: 2018-10-10

## 2018-09-05 NOTE — TELEPHONE ENCOUNTER
Medication is being filled for 1 time refill only due to:  patient is due for fasting px in October.  I sent juliánt also Gabbie Castellanos RN- Triage [employer]

## 2018-09-07 ENCOUNTER — TRANSFERRED RECORDS (OUTPATIENT)
Dept: HEALTH INFORMATION MANAGEMENT | Facility: CLINIC | Age: 73
End: 2018-09-07

## 2018-10-10 ENCOUNTER — OFFICE VISIT (OUTPATIENT)
Dept: FAMILY MEDICINE | Facility: CLINIC | Age: 73
End: 2018-10-10
Payer: COMMERCIAL

## 2018-10-10 VITALS
OXYGEN SATURATION: 98 % | WEIGHT: 166 LBS | HEIGHT: 70 IN | HEART RATE: 75 BPM | SYSTOLIC BLOOD PRESSURE: 126 MMHG | BODY MASS INDEX: 23.77 KG/M2 | TEMPERATURE: 97.5 F | RESPIRATION RATE: 20 BRPM | DIASTOLIC BLOOD PRESSURE: 64 MMHG

## 2018-10-10 DIAGNOSIS — D70.9 NEUTROPENIA, UNSPECIFIED TYPE (H): ICD-10-CM

## 2018-10-10 DIAGNOSIS — Z23 NEED FOR PROPHYLACTIC VACCINATION AND INOCULATION AGAINST INFLUENZA: ICD-10-CM

## 2018-10-10 DIAGNOSIS — Z00.00 ROUTINE HISTORY AND PHYSICAL EXAMINATION OF ADULT: Primary | ICD-10-CM

## 2018-10-10 DIAGNOSIS — E78.5 HYPERLIPIDEMIA LDL GOAL <100: ICD-10-CM

## 2018-10-10 DIAGNOSIS — Z12.5 SCREENING FOR PROSTATE CANCER: ICD-10-CM

## 2018-10-10 PROBLEM — R78.81 BACTEREMIA: Status: RESOLVED | Noted: 2017-03-12 | Resolved: 2018-10-10

## 2018-10-10 LAB
BASOPHILS # BLD AUTO: 0.1 10E9/L (ref 0–0.2)
BASOPHILS NFR BLD AUTO: 1 %
DIFFERENTIAL METHOD BLD: NORMAL
EOSINOPHIL # BLD AUTO: 0.1 10E9/L (ref 0–0.7)
EOSINOPHIL NFR BLD AUTO: 1.4 %
ERYTHROCYTE [DISTWIDTH] IN BLOOD BY AUTOMATED COUNT: 12.5 % (ref 10–15)
HCT VFR BLD AUTO: 47.4 % (ref 40–53)
HGB BLD-MCNC: 16.1 G/DL
LYMPHOCYTES # BLD AUTO: 1 10E9/L (ref 0.8–5.3)
LYMPHOCYTES NFR BLD AUTO: 19.6 %
MCH RBC QN AUTO: 30.6 PG (ref 26.5–33)
MCHC RBC AUTO-ENTMCNC: 34 G/DL (ref 31.5–36.5)
MCV RBC AUTO: 90 FL (ref 78–100)
MONOCYTES # BLD AUTO: 0.5 10E9/L (ref 0–1.3)
MONOCYTES NFR BLD AUTO: 10.2 %
NEUTROPHILS # BLD AUTO: 3.5 10E9/L (ref 1.6–8.3)
NEUTROPHILS NFR BLD AUTO: 67.8 %
PLATELET # BLD AUTO: 156 10E9/L (ref 150–450)
RBC # BLD AUTO: 5.27 10E12/L (ref 4.4–5.9)
WBC # BLD AUTO: 5.1 10E9/L (ref 4–11)

## 2018-10-10 PROCEDURE — 36415 COLL VENOUS BLD VENIPUNCTURE: CPT | Performed by: INTERNAL MEDICINE

## 2018-10-10 PROCEDURE — G0008 ADMIN INFLUENZA VIRUS VAC: HCPCS | Performed by: INTERNAL MEDICINE

## 2018-10-10 PROCEDURE — G0103 PSA SCREENING: HCPCS | Performed by: INTERNAL MEDICINE

## 2018-10-10 PROCEDURE — 80061 LIPID PANEL: CPT | Performed by: INTERNAL MEDICINE

## 2018-10-10 PROCEDURE — G0439 PPPS, SUBSEQ VISIT: HCPCS | Mod: 25 | Performed by: INTERNAL MEDICINE

## 2018-10-10 PROCEDURE — 80053 COMPREHEN METABOLIC PANEL: CPT | Performed by: INTERNAL MEDICINE

## 2018-10-10 PROCEDURE — 85025 COMPLETE CBC W/AUTO DIFF WBC: CPT | Performed by: INTERNAL MEDICINE

## 2018-10-10 PROCEDURE — 90662 IIV NO PRSV INCREASED AG IM: CPT | Performed by: INTERNAL MEDICINE

## 2018-10-10 RX ORDER — ATORVASTATIN CALCIUM 40 MG/1
TABLET, FILM COATED ORAL
Qty: 90 TABLET | Refills: 3 | Status: SHIPPED | OUTPATIENT
Start: 2018-10-10 | End: 2019-11-05

## 2018-10-10 ASSESSMENT — ANXIETY QUESTIONNAIRES
4. TROUBLE RELAXING: NOT AT ALL
7. FEELING AFRAID AS IF SOMETHING AWFUL MIGHT HAPPEN: NOT AT ALL
5. BEING SO RESTLESS THAT IT IS HARD TO SIT STILL: NOT AT ALL
1. FEELING NERVOUS, ANXIOUS, OR ON EDGE: NOT AT ALL
GAD7 TOTAL SCORE: 1
2. NOT BEING ABLE TO STOP OR CONTROL WORRYING: NOT AT ALL
3. WORRYING TOO MUCH ABOUT DIFFERENT THINGS: NOT AT ALL
6. BECOMING EASILY ANNOYED OR IRRITABLE: SEVERAL DAYS
GAD7 TOTAL SCORE: 1
7. FEELING AFRAID AS IF SOMETHING AWFUL MIGHT HAPPEN: NOT AT ALL
GAD7 TOTAL SCORE: 1

## 2018-10-10 ASSESSMENT — ACTIVITIES OF DAILY LIVING (ADL)
CURRENT_FUNCTION: NO ASSISTANCE NEEDED
I_NEED_ASSISTANCE_FOR_THE_FOLLOWING_DAILY_ACTIVITIES:: NO ASSISTANCE IS NEEDED

## 2018-10-10 ASSESSMENT — PATIENT HEALTH QUESTIONNAIRE - PHQ9
SUM OF ALL RESPONSES TO PHQ QUESTIONS 1-9: 1
10. IF YOU CHECKED OFF ANY PROBLEMS, HOW DIFFICULT HAVE THESE PROBLEMS MADE IT FOR YOU TO DO YOUR WORK, TAKE CARE OF THINGS AT HOME, OR GET ALONG WITH OTHER PEOPLE: NOT DIFFICULT AT ALL
SUM OF ALL RESPONSES TO PHQ QUESTIONS 1-9: 1

## 2018-10-10 NOTE — PATIENT INSTRUCTIONS
I will let you know your lab results.                                                                                                                                     Use some Debrox in both ear canals.                 Then try irrigating with warm water, or else come back here in 2 weeks for an ear wash.                   Consider getting the shingles vaccine (Shingrix) at your pharmacy.   Plan on a colonoscopy next year.

## 2018-10-10 NOTE — MR AVS SNAPSHOT
After Visit Summary   10/10/2018    Chester Palacios    MRN: [id number]           Patient Information     Date Of Birth          1945        Visit Information        Provider Department      10/10/2018 10:00 AM Dariel Jack MD [facility]        Today's Diagnoses     Routine history and physical examination of adult    -  1    Hyperlipidemia LDL goal <100        Neutropenia, unspecified type (H)        Screening for prostate cancer        Need for prophylactic vaccination and inoculation against influenza          Care Instructions    I will let you know your lab results.                                                                                                                                     Use some Debrox in both ear canals.                 Then try irrigating with warm water, or else come back here in 2 weeks for an ear wash.                   Consider getting the shingles vaccine (Shingrix) at your pharmacy.   Plan on a colonoscopy next year.                      Follow-ups after your visit        Follow-up notes from your care team     Return in about 1 year (around 10/10/2019) for yearly wellness visit, labs will be needed.      Who to contact     If you have questions or need follow up information about today's clinic visit or your schedule please contact [facility] directly at [phone].  Normal or non-critical lab and imaging results will be communicated to you by MyChart, letter or phone within 4 business days after the clinic has received the results. If you do not hear from us within 7 days, please contact the clinic through MyChart or phone. If you have a critical or abnormal lab result, we will notify you by phone as soon as possible.  Submit refill requests through 21[employer] or call your pharmacy and they will forward the refill request to us. Please allow 3 business days for your refill to be completed.     "      Additional Information About Your Visit        MyChart Information     Printi gives you secure access to your electronic health record. If you see a primary care provider, you can also send messages to your care team and make appointments. If you have questions, please call your primary care clinic.  If you do not have a primary care provider, please call [phone] and they will assist you.        Care EveryWhere ID     This is your Care EveryWhere ID. This could be used by other organizations to access your [place] medical records  [id number]        Your Vitals Were     Pulse Temperature Respirations Height Pulse Oximetry BMI (Body Mass Index)    75 97.5  F (36.4  C) 20 5' 10\" (1.778 m) 98% 23.82 kg/m2       Blood Pressure from Last 3 Encounters:   10/10/18 126/64   05/04/18 126/74   04/03/18 114/72    Weight from Last 3 Encounters:   10/10/18 166 lb (75.3 kg)   05/04/18 161 lb (73 kg)   04/03/18 170 lb (77.1 kg)              We Performed the Following     CBC with platelets and differential     Comprehensive metabolic panel (BMP + Alb, Alk Phos, ALT, AST, Total. Bili, TP)     Lipid Profile (Chol, Trig, HDL, LDL calc)     PSA, screen          Today's Medication Changes          These changes are accurate as of 10/10/18 10:40 AM.  If you have any questions, ask your nurse or doctor.               Stop taking these medicines if you haven't already. Please contact your care team if you have questions.     terbinafine 250 MG tablet   Commonly known as:  lamISIL   Stopped by:  Dariel Jack MD                Where to get your medicines      These medications were sent to [facility]/pharmacy #[id number] - [place], [place] - [address]  [address] [facility] [address]     Phone:  [phone]     atorvastatin 40 MG tablet                Primary Care Provider Office Phone # Fax #    Dariel Jack [phone] [address] [facility] [place] [facility] [address]        Equal Access " to Services     SOHAM THORNTON : Bernardo Persaud, waaideda luqadaha, qaybta kaalmada marin, yoly haro. So [facility] [phone].    ATENCIÓN: Si habla george, tiene a sepulveda disposición servicios gratuitos de asistencia lingüística. Llame al [phone].    We comply with applicable federal civil rights laws and [place] laws. We do not discriminate on the basis of race, color, national origin, age, disability, sex, sexual orientation, or gender identity.            Thank you!     Thank you for choosing [facility]  for your care. Our goal is always to provide you with excellent care. Hearing back from our patients is one way we can continue to improve our services. Please take a few minutes to complete the written survey that you may receive in the mail after your visit with us. Thank you!             Your Updated Medication List - Protect others around you: Learn how to safely use, store and throw away your medicines at www.disposemymeds.org.          This list is accurate as of 10/10/18 10:40 AM.  Always use your most recent med list.                   Brand Name Dispense Instructions for use Diagnosis    ACETAMINOPHEN PO      Take 650 mg by mouth every 4 hours as needed for pain        amoxicillin 500 MG tablet    AMOXIL    4 tablet    Take 2000 mg 60 min prior to dental work    Preventive antibiotic       aspirin 81 MG tablet      Take 81 mg by mouth every morning        atorvastatin 40 MG tablet    LIPITOR    90 tablet    TAKE 1 TABLET (40 MG) BY MOUTH DAILY    Hyperlipidemia LDL goal <100       busPIRone 10 MG tablet    BUSPAR     Take by mouth 3 times daily 20 mg every morning, 10 mg every afternoon, 20 mg HS        diclofenac 1 % Gel topical gel    VOLTAREN    100 g    Place onto the skin as needed for moderate pain Patient uses prn on hands for playing zoomsquare.    Arthritis of both hands       DOXEPIN HCL PO      Take by mouth At Bedtime  Patient states that he takes 1.25 mL HS (12.5mg/1.25mL).

## 2018-10-10 NOTE — PROGRESS NOTES

## 2018-10-10 NOTE — PROGRESS NOTES
SUBJECTIVE:   Chester Palacios is a 73 year old male who presents for Preventive Visit.      Are you in the first 12 months of your Medicare coverage?  No    Physical   Annual:     Getting at least 3 servings of Calcium per day:  Yes    Bi-annual eye exam:  Yes    Dental care twice a year:  NO    Sleep apnea or symptoms of sleep apnea:  Daytime drowsiness    Diet:  Regular (no restrictions)    Taking medications regularly:  Yes    Medication side effects:  Not applicable    Additional concerns today:  YES    Ability to successfully perform activities of daily living: no assistance needed    Home Safety:  No safety concerns identified    Hearing Impairment: difficulty following a conversation in a noisy restaurant or crowded room, need to ask people to speak up or repeat themselves and difficulty understanding soft or whispered speech        Fall risk:  Fallen 2 or more times in the past year?: No  Any fall with injury in the past year?: No    COGNITIVE SCREEN  1) Repeat 3 items (Leader, Season, Table)    2) Clock draw: NORMAL  3) 3 item recall: Recalls 3 objects  Results: 3 items recalled: COGNITIVE IMPAIRMENT LESS LIKELY    Mini-CogTM Copyright SUSHANT Medley. Licensed by the author for use in [facility]; reprinted with permission (shaun@[facility]). All rights reserved.        Reviewed and updated as needed this visit by clinical staff  Tobacco  Allergies  Meds  Med Hx  Surg Hx  Fam Hx  Soc Hx        Reviewed and updated as needed this visit by Provider                         His Achilles tendon is improving, as is his shoulder.     He is able to exercise.  He is swimming once or twice per week, playing tennis, walking etc.           He just saw urology recently, in good and got a good report.    Most recent prostate procedure was successful.    Alcohol Use 10/10/2018   If you drink alcohol do you typically have greater than 3 drinks per day OR greater than 7 drinks per week? No               Today's  PHQ-2 Score:   PHQ-2 ( 1999 [employer]) 10/10/2018   Q1: Little interest or pleasure in doing things 0   Q2: Feeling down, depressed or hopeless 0   PHQ-2 Score 0   Q1: Little interest or pleasure in doing things Not at all   Q2: Feeling down, depressed or hopeless Not at all   PHQ-2 Score 0       Do you feel safe in your environment - Yes    Do you have a Health Care Directive?: Yes: Advance Directive has been received and scanned.    Current providers sharing in care for this patient include:   Patient Care Team:  Dariel Jack MD as PCP - General (Internal Medicine)  Rosa Macias MD as MD (Urology)  Olena Lezama RN as Registered Nurse (Urology)  Dariel Jack MD as Referring Physician (Internal Medicine)    The following health maintenance items are reviewed in Epic and correct as of today:  Health Maintenance   Topic Date Due     AORTIC ANEURYSM SCREENING (SYSTEM ASSIGNED)  07/19/2010     FALL RISK ASSESSMENT  12/21/2016     INFLUENZA VACCINE (1) 09/01/2018     PHQ-2 Q1 YR  04/03/2019     COLONOSCOPY Q10 YR  10/27/2019     ADVANCE DIRECTIVE PLANNING Q5 YRS  10/27/2020     TETANUS IMMUNIZATION (SYSTEM ASSIGNED)  12/09/2020     LIPID SCREEN Q5 YR MALE (SYSTEM ASSIGNED)  10/09/2022     PNEUMOCOCCAL  Completed     HEPATITIS C SCREENING  Completed     Patient Active Problem List    Diagnosis Date Noted     Abnormal echocardiogram 12/12/2016     Priority: High     Coronary artery calcification seen on computed tomography 12/12/2016     Priority: High     See Cardiology consult 1/17;          Calcium score = 64%;      cardiac MRI shows EF 60%, no scar; switched to atorvastatin.          Anxiety state 10/26/2012     Priority: High     Works with psychiatrist       Achilles tendinosis of left lower extremity 04/16/2018     Priority: Medium     Achilles tendinosis 04/05/2018     Priority: Medium     Preventive antibiotic 03/27/2018     Priority: Medium     Right shoulder pain, unspecified chronicity  07/31/2017     Priority: Medium     Hyperlipidemia LDL goal <100 04/05/2017     Priority: Medium     LDL 92 with 40 mg atorva in 10/17       BPH (benign prostatic hyperplasia) 03/08/2017     Priority: Medium     Neutropenia, unspecified type (H) 11/08/2016     Priority: Medium     Total white count 3000, with 1.5 neutrophils in November 2016, and platelets 147,000; normal on f/u in 12/16       IFG (impaired fasting glucose) 11/08/2016     Priority: Medium     111 in November 2016;  90 in 10/17       Osteoarthritis, unspecified osteoarthritis type, unspecified site 11/07/2016     Priority: Medium     Lower urinary tract symptoms (LUTS) 11/05/2016     Priority: Medium     See urology consult 10/16         Arthritis of right hip 12/21/2015     Priority: Medium     Arthritis of both hands 12/21/2015     Priority: Medium     Nonspecific abnormal electrocardiogram (ECG) (EKG) 12/21/2015     Priority: Medium     Sinus shelton rate 45; NSSTT changes; echo ordered as a pre-op test.        Family history of hemochromatosis 10/25/2015     Priority: Medium     Brother; Chester had nml labs in 2010       Primary localized osteoarthrosis, pelvic region and thigh 10/26/2012     Priority: Medium     Osteoarthritis 10/26/2012     Priority: Medium     Problem list name updated by automated process. Provider to review       Insomnia 10/26/2012     Priority: Medium     He stopped taking alprazolam in 2015       Hearing loss 10/26/2012     Priority: Medium     Problem list name updated by automated process. Provider to review       ACP (advance care planning) 10/27/2015     Priority: Low     Advance Care Planning 10/27/2015: Receipt of ACP document:  Received: Health Care Directive which was witnessed or notarized on 4/9/14.  Document not previously scanned.  Validation form completed and sent with document to be scanned.  Code Status needs to be updated to reflect choices in most recent ACP document. Notification sent to Dr. Dariel Jack  for followup.  Confirmed/documented designated decision maker(s).  Added by Radha Joaquin             Preventive measure 09/26/2013     Priority: Low     APRIMA DATA BASE UNDER THE 9/26/13 NOTE  Colonoscopy 10/09; PSA 1.81 in 10/12; stable. 1.8 in 11/13, 2.2 in 10/14; 2.04 in 10/15; 1.31 in 11/16; 1.33 in 10/17         Past Surgical History:   Procedure Laterality Date     CYSTOSCOPY, TRANSURETHRAL RESECTION (TUR) PROSTATE, COMBINED N/A 3/8/2017    Procedure: COMBINED CYSTOSCOPY, TRANSURETHRAL RESECTION (TUR) PROSTATE;  Surgeon: Rosa Macias MD;  Location: [facility] OR     HERNIA REPAIR  1985    Inguinal     JOINT REPLACEMENT, HIP RT/LT Right 1/16    Joint Replacement Hip RT/LT     ORTHOPEDIC SURGERY      R elbow Orif     PROSTATE SURGERY  2007    TUNA for BPH     TONSILLECTOMY      age 5     Social History     Social History     Marital status:      Spouse name: N/A     Number of children: N/A     Years of education: N/A     Occupational History      Self Employed.     Social History Main Topics     Smoking status: Never Smoker     Smokeless tobacco: Never Used     Alcohol use Yes      Comment: occ.     Drug use: No     Sexual activity: Yes     Partners: Female     Other Topics Concern     Parent/Sibling W/ Cabg, Mi Or Angioplasty Before 65f 55m? Yes     Social History Narrative    Retired 2013     Immunization History   Administered Date(s) Administered     HEPA 03/05/2010, 10/29/2010     HepB 10/29/2010     Influenza (High Dose) 3 valent vaccine 11/04/2013, 10/27/2014, 10/26/2015, 10/09/2017     Influenza (IIV3) PF 10/10/2011, 10/29/2012     Influenza Vaccine IM 3yrs+ 4 Valent IIV4 11/07/2016     Pneumo Conj 13-V (2010&after) 10/26/2015     Pneumococcal 23 valent 04/29/2011     TD (ADULT, 7+) 12/09/2010     Zoster vaccine, live 10/20/2009       BP Readings from Last 3 Encounters:   10/10/18 126/64   05/04/18 126/74   04/03/18 114/72    Wt Readings from Last 3 Encounters:   10/10/18 166 lb (75.3  "kg)   05/04/18 161 lb (73 kg)   04/03/18 170 lb (77.1 kg)                  Current Outpatient Prescriptions   Medication Sig Dispense Refill     ACETAMINOPHEN PO Take 650 mg by mouth every 4 hours as needed for pain       amoxicillin (AMOXIL) 500 MG tablet Take 2000 mg 60 min prior to dental work 4 tablet 11     aspirin 81 MG tablet Take 81 mg by mouth every morning        atorvastatin (LIPITOR) 40 MG tablet TAKE 1 TABLET (40 MG) BY MOUTH DAILY 90 tablet 0     busPIRone (BUSPAR) 10 MG tablet Take by mouth 3 times daily 20 mg every morning, 10 mg every afternoon, 20 mg HS       diclofenac (VOLTAREN) 1 % GEL Place onto the skin as needed for moderate pain Patient uses prn on hands for playing Mzinga. 100 g 3     DOXEPIN HCL PO Take by mouth At Bedtime Patient states that he takes 1.25 mL HS (12.5mg/1.25mL).               Allergies   Allergen Reactions     Animal Dander      cats           Review of Systems  CONSTITUTIONAL: NEGATIVE for fever, chills, change in weight  INTEGUMENTARY/SKIN: NEGATIVE for worrisome rashes, moles or lesions  EYES: NEGATIVE for vision changes or irritation  ENT/MOUTH: POSITIVE for hearing loss  RESP: NEGATIVE for significant cough or SOB  BREAST: NEGATIVE for masses, tenderness or discharge  CV: NEGATIVE for chest pain, palpitations or peripheral edema  GI: NEGATIVE for nausea, abdominal pain, heartburn, or change in bowel habits  : NEGATIVE for frequency, dysuria, or hematuria  NEURO: NEGATIVE for weakness, dizziness or paresthesias  ENDOCRINE: NEGATIVE for temperature intolerance, skin/hair changes  HEME: NEGATIVE for bleeding problems  PSYCHIATRIC: NEGATIVE for changes in mood or affect    OBJECTIVE:   /64 (BP Location: Right arm, Patient Position: Chair, Cuff Size: Adult Regular)  Pulse 75  Temp 97.5  F (36.4  C)  Resp 20  Ht 5' 10\" (1.778 m)  Wt 166 lb (75.3 kg)  SpO2 98%  BMI 23.82 kg/m2 Estimated body mass index is 23.1 kg/(m^2) as calculated from the following:    " "Height as of 5/4/18: 5' 10\" (1.778 m).    Weight as of 5/4/18: 161 lb (73 kg).  Physical Exam  GENERAL: healthy, alert and no distress  EYES: Eyes grossly normal to inspection, PERRL and conjunctivae and sclerae normal  HENT: normal cephalic/atraumatic, right ear: occluded with wax, left ear: occluded with wax, nose and mouth without ulcers or lesions, oropharynx clear and oral mucous membranes moist  NECK: no adenopathy, no asymmetry, masses, or scars and thyroid normal to palpation  RESP: lungs clear to auscultation - no rales, rhonchi or wheezes  CV: regular rate and rhythm, normal S1 S2, no S3 or S4, no murmur, click or rub, no peripheral edema and peripheral pulses strong  ABDOMEN: soft, nontender, no hepatosplenomegaly, no masses and bowel sounds normal   (male): normal male genitalia without lesions or urethral discharge, no hernia  SKIN: no suspicious lesions or rashes  NEURO: Normal strength and tone, mentation intact and speech normal  PSYCH: affect normal/bright  LYMPH: no cervical, supraclavicular, axillary, or inguinal adenopathy    Diagnostic Test Results:  pending    ASSESSMENT / PLAN:   Chester was seen today for physical.    Diagnoses and all orders for this visit:    Routine history and physical examination of adult    Hyperlipidemia LDL goal <100  -     Comprehensive metabolic panel (BMP + Alb, Alk Phos, ALT, AST, Total. Bili, TP)  -     Lipid Profile (Chol, Trig, HDL, LDL calc)  -     atorvastatin (LIPITOR) 40 MG tablet; TAKE 1 TABLET (40 MG) BY MOUTH DAILY    Neutropenia, unspecified type (H)  -     CBC with platelets and differential    Screening for prostate cancer  -     PSA, screen    Need for prophylactic vaccination and inoculation against influenza                 Summary and implications:  We reviewed his situation.  Labs are ordered.            His overall health appears quite stable.  Patient Instructions   I will let you know your lab results.                                           " "                                                                                          Use some Debrox in both ear canals.                 Then try irrigating with warm water, or else come back here in 2 weeks for an ear wash.                   Consider getting the shingles vaccine (Shingrix) at your pharmacy.   Plan on a colonoscopy next year.                He wants to wait the full 10 years until his next colonoscopy.    End of Life Planning:  Patient currently has an advanced directive: Yes.  Practitioner is supportive of decision.    COUNSELING:  Reviewed preventive health counseling, as reflected in patient instructions    BP Readings from Last 1 Encounters:   05/04/18 126/74     Estimated body mass index is 23.1 kg/(m^2) as calculated from the following:    Height as of 5/4/18: 5' 10\" (1.778 m).    Weight as of 5/4/18: 161 lb (73 kg).           reports that he has never smoked. He has never used smokeless tobacco.      Appropriate preventive services were discussed with this patient, including applicable screening as appropriate for cardiovascular disease, diabetes, osteopenia/osteoporosis, and glaucoma.  As appropriate for age/gender, discussed screening for colorectal cancer, prostate cancer, breast cancer, and cervical cancer. Checklist reviewing preventive services available has been given to the patient.    Reviewed patients plan of care and provided an AVS. The Basic Care Plan (routine screening as documented in Health Maintenance) for Chester meets the Care Plan requirement. This Care Plan has been established and reviewed with the Patient.    Counseling Resources:  ATP IV Guidelines  Pooled Cohorts Equation Calculator  Breast Cancer Risk Calculator  FRAX Risk Assessment  ICSI Preventive Guidelines  Dietary Guidelines for Americans, 2010  USDA's MyPlate  ASA Prophylaxis  Lung CA Screening    Dariel Jack MD  [facility]  Answers for HPI/ROS submitted by the patient on " 10/10/2018   PHQ-2 Score: 0  If you checked off any problems, how difficult have these problems made it for you to do your work, take care of things at home, or get along with other people?: Not difficult at all  PHQ9 TOTAL SCORE: 1  GLENNA 7 TOTAL SCORE: 1        Results for orders placed or performed in visit on 10/10/18   Comprehensive metabolic panel (BMP + Alb, Alk Phos, ALT, AST, Total. Bili, TP)   Result Value Ref Range    Sodium 142 133 - 144 mmol/L    Potassium 4.1 3.4 - 5.3 mmol/L    Chloride 108 94 - 109 mmol/L    Carbon Dioxide 29 20 - 32 mmol/L    Anion Gap 5 3 - 14 mmol/L    Glucose 84 70 - 99 mg/dL    Urea Nitrogen 15 7 - 30 mg/dL    Creatinine 0.97 0.66 - 1.25 mg/dL    GFR Estimate 76 >60 mL/min/1.7m2    GFR Estimate If Black >90 >60 mL/min/1.7m2    Calcium 9.2 8.5 - 10.1 mg/dL    Bilirubin Total 0.5 0.2 - 1.3 mg/dL    Albumin 3.9 3.4 - 5.0 g/dL    Protein Total 7.5 6.8 - 8.8 g/dL    Alkaline Phosphatase 96 40 - 150 U/L    ALT 29 0 - 70 U/L    AST 20 0 - 45 U/L   Lipid Profile (Chol, Trig, HDL, LDL calc)   Result Value Ref Range    Cholesterol 146 <200 mg/dL    Triglycerides 52 <150 mg/dL    HDL Cholesterol 52 >39 mg/dL    LDL Cholesterol Calculated 84 <100 mg/dL    Non HDL Cholesterol 94 <130 mg/dL   PSA, screen   Result Value Ref Range    PSA 1.10 0 - 4 ug/L   CBC with platelets and differential   Result Value Ref Range    WBC 5.1 4.0 - 11.0 10e9/L    RBC Count 5.27 4.4 - 5.9 10e12/L    Hemoglobin 16.1 g/dL    Hematocrit 47.4 40.0 - 53.0 %    MCV 90 78 - 100 fl    MCH 30.6 26.5 - 33.0 pg    MCHC 34.0 31.5 - 36.5 g/dL    RDW 12.5 10.0 - 15.0 %    Platelet Count 156 150 - 450 10e9/L    Diff Method Automated Method     % Neutrophils 67.8 %    % Lymphocytes 19.6 %    % Monocytes 10.2 %    % Eosinophils 1.4 %    % Basophils 1.0 %    Absolute Neutrophil 3.5 1.6 - 8.3 10e9/L    Absolute Lymphocytes 1.0 0.8 - 5.3 10e9/L    Absolute Monocytes 0.5 0.0 - 1.3 10e9/L    Absolute Eosinophils 0.1 0.0 - 0.7 10e9/L     Absolute Basophils 0.1 0.0 - 0.2 10e9/L     My chart message sent.    Your lab results are normal,including the liver,kidney,glucose,bone marrow,cholesterol,and the PSA level.      Keep taking the same medications.

## 2018-10-10 NOTE — NURSING NOTE
Prior to injection verified patient identity using patient's name and date of birth.  Due to injection administration, patient instructed to remain in clinic for 15 minutes  afterwards, and to report any adverse reaction to me immediately.  Katy Miles LPN

## 2018-10-11 LAB
ALBUMIN SERPL-MCNC: 3.9 G/DL (ref 3.4–5)
ALP SERPL-CCNC: 96 U/L (ref 40–150)
ALT SERPL W P-5'-P-CCNC: 29 U/L (ref 0–70)
ANION GAP SERPL CALCULATED.3IONS-SCNC: 5 MMOL/L (ref 3–14)
AST SERPL W P-5'-P-CCNC: 20 U/L (ref 0–45)
BILIRUB SERPL-MCNC: 0.5 MG/DL (ref 0.2–1.3)
BUN SERPL-MCNC: 15 MG/DL (ref 7–30)
CALCIUM SERPL-MCNC: 9.2 MG/DL (ref 8.5–10.1)
CHLORIDE SERPL-SCNC: 108 MMOL/L (ref 94–109)
CHOLEST SERPL-MCNC: 146 MG/DL
CO2 SERPL-SCNC: 29 MMOL/L (ref 20–32)
CREAT SERPL-MCNC: 0.97 MG/DL (ref 0.66–1.25)
GFR SERPL CREATININE-BSD FRML MDRD: 76 ML/MIN/1.7M2
GLUCOSE SERPL-MCNC: 84 MG/DL (ref 70–99)
HDLC SERPL-MCNC: 52 MG/DL
LDLC SERPL CALC-MCNC: 84 MG/DL
NONHDLC SERPL-MCNC: 94 MG/DL
POTASSIUM SERPL-SCNC: 4.1 MMOL/L (ref 3.4–5.3)
PROT SERPL-MCNC: 7.5 G/DL (ref 6.8–8.8)
PSA SERPL-ACNC: 1.1 UG/L (ref 0–4)
SODIUM SERPL-SCNC: 142 MMOL/L (ref 133–144)
TRIGL SERPL-MCNC: 52 MG/DL

## 2018-10-11 ASSESSMENT — PATIENT HEALTH QUESTIONNAIRE - PHQ9: SUM OF ALL RESPONSES TO PHQ QUESTIONS 1-9: 1

## 2018-10-11 ASSESSMENT — ANXIETY QUESTIONNAIRES: GAD7 TOTAL SCORE: 1

## 2019-03-04 ENCOUNTER — TELEPHONE (OUTPATIENT)
Dept: FAMILY MEDICINE | Facility: CLINIC | Age: 74
End: 2019-03-04

## 2019-03-04 DIAGNOSIS — M25.512 LEFT SHOULDER PAIN, UNSPECIFIED CHRONICITY: Primary | ICD-10-CM

## 2019-03-04 NOTE — TELEPHONE ENCOUNTER
Reason for Call: Request for an order or referral:    Order or referral being requested: referral    Date needed: as soon as possible    Has the patient been seen by the PCP for this problem? YES    Additional comments: Pt was treated for rt shoulder pain but now has left shoulder pain and would like to be seen at [facility].  Please fax a referral. He has appt Wednesday this week.    Phone number Patient can be reached at:  Home number on file [phone] (home)    Best Time:  asap    Can we leave a detailed message on this number?  YES    Call taken on 3/4/2019 at 4:26 PM by LARRY PARK

## 2019-03-04 NOTE — TELEPHONE ENCOUNTER
I have generated a referral.                         Please let him know.   Please send it.               In my out box at station 3.

## 2019-03-06 ENCOUNTER — TRANSFERRED RECORDS (OUTPATIENT)
Dept: HEALTH INFORMATION MANAGEMENT | Facility: CLINIC | Age: 74
End: 2019-03-06

## 2019-03-15 ENCOUNTER — THERAPY VISIT (OUTPATIENT)
Dept: PHYSICAL THERAPY | Facility: CLINIC | Age: 74
End: 2019-03-15
Payer: COMMERCIAL

## 2019-03-15 DIAGNOSIS — M25.512 SHOULDER PAIN, LEFT: ICD-10-CM

## 2019-03-15 PROCEDURE — 97110 THERAPEUTIC EXERCISES: CPT | Mod: GP | Performed by: PHYSICAL THERAPIST

## 2019-03-15 PROCEDURE — 97161 PT EVAL LOW COMPLEX 20 MIN: CPT | Mod: GP | Performed by: PHYSICAL THERAPIST

## 2019-03-15 NOTE — PROGRESS NOTES
[facility] Initial Evaluation  Subjective:  The history is provided by the patient. No  was used.   Chester Palacios is a 73 year old male with a left shoulder condition.  Condition occurred with:  Repetition/overuse.  Condition occurred: during recreation/sport.  This is a new condition  Pt reports he strained his L shoulder 2 months ago while swimming. More recently he was taking swim lessons and thinks he irritated it more. [facility] visit 3-06-19 received PT referral. Pt had xrays at [facility] . .    Patient reports pain:  Anterior and lateral.    Pain is described as aching and is intermittent and reported as 3/10.   Pain is worse during the day and worse in the P.M..  Symptoms are exacerbated by lifting, using arm at shoulder level and using arm overhead and relieved by rest.  Since onset symptoms are unchanged.  Special tests:  X-ray.                                                    Objective:  System                   Shoulder Evaluation:  ROM:  AROM:    Flexion:  Left:  120 pain        Abduction:  Left: 90 pain                     Extension/Internal Rotation:  Left:  T10 pain    Right:  T10 no pain    PROM:    Flexion:  Left:  120    Right: 150          Internal Rotation:  Left:  45 pain    Right:  55 no pain  External Rotation:  Left:  85 pain    Right:  85 no pain                    Strength:    Flexion: Left:4+/5   Pain:        Abduction:  Left: 4+/5   Pain:-          External Rotation:   Left:4+/5      Pain:+             Stability Testing:  normal      Special Tests:    Left shoulder positive for the following special tests:  Impingement    Palpation:    Left shoulder tenderness present at:  Biceps and Supraspinatus    Mobility Tests:            Scapulothoracic left:  Hypomobile  Scapulothoracic right:  Hypomobile                                       General     ROS    Assessment/Plan:    Patient is a 73 year old male with left side shoulder complaints.    Patient has  the following significant findings with corresponding treatment plan.                Diagnosis 1:  L shoulder pain  Pain -  hot/cold therapy and manual therapy  Decreased ROM/flexibility - manual therapy and therapeutic exercise  Decreased strength - therapeutic exercise and therapeutic activities  Impaired muscle performance - neuro re-education  Decreased function - therapeutic activities    Therapy Evaluation Codes:   .  Cumulative Therapy Evaluation is: Low complexity.    Previous and current functional limitations:  (See Goal Flow Sheet for this information)    Short term and Long term goals: (See Goal Flow Sheet for this information)     Communication ability:  Patient appears to be able to clearly communicate and understand verbal and written communication and follow directions correctly.  Treatment Explanation - The following has been discussed with the patient:   RX ordered/plan of care  Anticipated outcomes  Possible risks and side effects  This patient would benefit from PT intervention to resume normal activities.   Rehab potential is good.    Frequency:  1 X week, once daily  Duration:  for 6 weeks  Discharge Plan:  Achieve all LTG.  Independent in home treatment program.  Reach maximal therapeutic benefit.    Please refer to the daily flowsheet for treatment today, total treatment time and time spent performing 1:1 timed codes.

## 2019-03-15 NOTE — LETTER
[facility] ATHLETIC [facility] PHYSICAL [facility]  [address]  [facility] [facility] [id number] [phone]    2019    Re: Chester Palacios   :  1945  MRN:  [id number]   REFERRING PHYSICIAN:   Armen Rosas    [facility]    Date of Initial Evaluation:  3-15-19  Visits:  Rxs Used: 1  Reason for Referral:  Shoulder pain, left    EVALUATION SUMMARY    [facility]  Subjective:  The history is provided by the patient. No  was used.  Chester Palacios is a 73 year old male with a left shoulder condition.  Condition occurred with:  Repetition/overuse.  Condition occurred: during recreation/sport.  This is a new condition  Pt reports he strained his L shoulder 2 months ago while swimming. More recently he was taking swim lessons and thinks he irritated it more. MD visit 3-06-19 received PT referral. Pt had xrays at MD. Patient reports pain:  Anterior and lateral.    Pain is described as aching and is intermittent and reported as 3/10.  Pain is worse during the day and worse in the P.M.  Symptoms are exacerbated by lifting, using arm at shoulder level and using arm overhead and relieved by rest.  Since onset symptoms are unchanged.  Special tests:  X-ray.  Pertinent medical history includes:  History of fractures and implanted devices.  Medical allergies: no.  Other surgeries include:  Other (Hip replacement, hernia).  Current medications:  Anti-depressants, pain medication and sleep medication.   Employment status: Retired.  Primary job tasks include:  Driving and lifting (Computer work, pushing/pulling).      Objective:  System           Shoulder Evaluation:  ROM:  AROM:    Flexion:  Left:  120 pain      Abduction:  Left: 90 pain     Extension/Internal Rotation:  Left:  T10 pain    Right:  T10 no pain    PROM:    Flexion:  Left:  120    Right: 150    Internal Rotation:   Left:  45 pain    Right:  55 no pain  External Rotation:  Left:  85 pain    Right:  85 no pain  Strength:    Flexion: Left:4+/5   Pain:      Abduction:  Left: 4+/5   Pain:-      External Rotation:   Left:4+/5      Pain:+         Re: Chester Palacios   :  1945    Stability Testing:  normal  Special Tests:    Left shoulder positive for the following special tests:  Impingement  Palpation:    Left shoulder tenderness present at:  Biceps and Supraspinatus  Mobility Tests:    Scapulothoracic left:  Hypomobile  Scapulothoracic right:  Hypomobile  General   ROS    Assessment/Plan:    Patient is a 73 year old male with left side shoulder complaints.    Patient has the following significant findings with corresponding treatment plan.                Diagnosis 1:  L shoulder pain  Pain -  hot/cold therapy and manual therapy  Decreased ROM/flexibility - manual therapy and therapeutic exercise  Decreased strength - therapeutic exercise and therapeutic activities  Impaired muscle performance - neuro re-education  Decreased function - therapeutic activities    Therapy Evaluation Codes: .  Cumulative Therapy Evaluation is: Low complexity.    Previous and current functional limitations:  (See Goal Flow Sheet for this information)    Short term and Long term goals: (See Goal Flow Sheet for this information)   Communication ability:  Patient appears to be able to clearly communicate and understand verbal and written communication and follow directions correctly.  Treatment Explanation - The following has been discussed with the patient:   RX ordered/plan of care  Anticipated outcomes  Possible risks and side effects  This patient would benefit from PT intervention to resume normal activities.   Rehab potential is good.  Frequency:  1 X week, once daily  Duration:  for 6 weeks  Discharge Plan:  Achieve all LTG.  Independent in home treatment program.  Reach maximal therapeutic benefit.    Thank you for your  referral.    INQUIRIES  Therapist: Yessi David,    [facility] - [facility] PHYSICAL THERAPY  [facility]  [place] [facility] [address]  Phone: [phone]  Fax: [phone]

## 2019-03-19 ENCOUNTER — THERAPY VISIT (OUTPATIENT)
Dept: PHYSICAL THERAPY | Facility: CLINIC | Age: 74
End: 2019-03-19
Payer: COMMERCIAL

## 2019-03-19 DIAGNOSIS — M25.512 ACUTE PAIN OF LEFT SHOULDER: ICD-10-CM

## 2019-03-19 PROCEDURE — 97110 THERAPEUTIC EXERCISES: CPT | Mod: GP | Performed by: PHYSICAL THERAPIST

## 2019-03-20 NOTE — PROGRESS NOTES
[place] [facility] Initial Evaluation  Subjective:                                       Pertinent medical history includes:  History of fractures and implanted devices.  Medical allergies: no.  Other surgeries include:  Other (Hip replacement, hernia).  Current medications:  Anti-depressants, pain medication and sleep medication.    Employment status: Retired.  Primary job tasks include:  Driving and lifting (Computer work, pushing/pulling).                                Objective:  System    Physical Exam    General     ROS    Assessment/Plan:

## 2019-03-20 NOTE — PROGRESS NOTES
[place] [facility] Initial Evaluation  Subjective:                                       Pertinent medical history includes:  History of fractures and implanted devices.  Medical allergies: no.  Other surgeries include:  Other (Hip replacement, hernia).  Current medications:  Anti-depressants, pain medication and sleep medication.    Employment status: Retired.  Primary job tasks include:  Driving and lifting (Computer work, pushing/pulling).                                Objective:  System    Physical Exam    General     ROS    Assessment/Plan:

## 2019-03-22 ENCOUNTER — TRANSFERRED RECORDS (OUTPATIENT)
Dept: HEALTH INFORMATION MANAGEMENT | Facility: CLINIC | Age: 74
End: 2019-03-22

## 2019-03-26 ENCOUNTER — THERAPY VISIT (OUTPATIENT)
Dept: PHYSICAL THERAPY | Facility: CLINIC | Age: 74
End: 2019-03-26
Payer: COMMERCIAL

## 2019-03-26 DIAGNOSIS — M25.512 ACUTE PAIN OF LEFT SHOULDER: ICD-10-CM

## 2019-03-26 PROCEDURE — 97112 NEUROMUSCULAR REEDUCATION: CPT | Mod: GP | Performed by: PHYSICAL THERAPIST

## 2019-03-26 PROCEDURE — 97110 THERAPEUTIC EXERCISES: CPT | Mod: GP | Performed by: PHYSICAL THERAPIST

## 2019-04-02 ENCOUNTER — THERAPY VISIT (OUTPATIENT)
Dept: PHYSICAL THERAPY | Facility: CLINIC | Age: 74
End: 2019-04-02
Payer: COMMERCIAL

## 2019-04-02 DIAGNOSIS — M25.512 ACUTE PAIN OF LEFT SHOULDER: ICD-10-CM

## 2019-04-02 PROCEDURE — 97110 THERAPEUTIC EXERCISES: CPT | Mod: GP | Performed by: PHYSICAL THERAPIST

## 2019-04-02 PROCEDURE — 97035 APP MDLTY 1+ULTRASOUND EA 15: CPT | Mod: GP | Performed by: PHYSICAL THERAPIST

## 2019-04-02 PROCEDURE — 97112 NEUROMUSCULAR REEDUCATION: CPT | Mod: GP | Performed by: PHYSICAL THERAPIST

## 2019-04-09 ENCOUNTER — THERAPY VISIT (OUTPATIENT)
Dept: PHYSICAL THERAPY | Facility: CLINIC | Age: 74
End: 2019-04-09
Payer: COMMERCIAL

## 2019-04-09 DIAGNOSIS — M25.512 ACUTE PAIN OF LEFT SHOULDER: ICD-10-CM

## 2019-04-09 PROCEDURE — 97140 MANUAL THERAPY 1/> REGIONS: CPT | Mod: GP | Performed by: PHYSICAL THERAPIST

## 2019-04-09 PROCEDURE — 97035 APP MDLTY 1+ULTRASOUND EA 15: CPT | Mod: GP | Performed by: PHYSICAL THERAPIST

## 2019-04-09 PROCEDURE — 97110 THERAPEUTIC EXERCISES: CPT | Mod: GP | Performed by: PHYSICAL THERAPIST

## 2019-04-16 ENCOUNTER — THERAPY VISIT (OUTPATIENT)
Dept: PHYSICAL THERAPY | Facility: CLINIC | Age: 74
End: 2019-04-16
Payer: COMMERCIAL

## 2019-04-16 DIAGNOSIS — M25.512 ACUTE PAIN OF LEFT SHOULDER: ICD-10-CM

## 2019-04-16 PROCEDURE — 97110 THERAPEUTIC EXERCISES: CPT | Mod: GP | Performed by: PHYSICAL THERAPIST

## 2019-04-16 PROCEDURE — 97140 MANUAL THERAPY 1/> REGIONS: CPT | Mod: GP | Performed by: PHYSICAL THERAPIST

## 2019-04-16 PROCEDURE — 97035 APP MDLTY 1+ULTRASOUND EA 15: CPT | Mod: GP | Performed by: PHYSICAL THERAPIST

## 2019-04-16 NOTE — LETTER
[facility] ATHLETIC [facility] PHYSICAL [facility]  [address]  [facility] [id number] [phone]    2019    Re: Chester Palacios   :   1945  MRN:  [id number]   REFERRING PHYSICIAN:   Armen Rosas    [facility] ATHLETIC [facility] PHYSICAL [facility]    Date of Initial Evaluation:  3-15-19  Visits:  Rxs Used: 6  Reason for Referral:  Acute pain of left shoulder    EVALUATION SUMMARY    PROGRESS  REPORT  Progress reporting period is from 3-15-19 to 19.       SUBJECTIVE  Subjective: Pt reports he took his grand kids to the pool and tried  the breast stroke and  played with the kids. Got sore that night,  but next day did his exs and felt ok    Current Pain level: 3/10.     Previous pain level was  5/10.   Changes in function:  Yes (See Goal flowsheet attached for changes in current functional level)  Adverse reaction to treatment or activity: None    OBJECTIVE  Changes noted in objective findings:  The objective findings below are from DOS 19.  Objective: His days are up and down. Some days are worse than others.. AROM flexion to 120 no pain Abd to90  painfu,l extension wnl no, painful ext/ir to T12 slight pain. PROM flexion 120 Abd 90 ER 80 vs 90 on right IR wnl all motions are painful at end rom except IR. Very slow progress with the exs.To see MD this week     ASSESSMENT/PLAN  Updated problem list and treatment plan: Diagnosis 1:  L shoulder pain  Pain -  US and manual therapy  Decreased ROM/flexibility - manual therapy and therapeutic exercise  Decreased strength - therapeutic exercise and therapeutic activities  Decreased function - therapeutic activities  STG/LTGs have been met or progress has been made towards goals:  Yes (See Goal flow sheet completed today.)  Assessment of Progress: The patient's condition has potential to improve.  Self Management Plans:  Patient has been instructed in a home treatment program.  I have re-evaluated this  patient and find that the nature, scope, duration and intensity of the therapy is appropriate for the medical condition of the patient.  Chester continues to require the following intervention to meet STG and LTG's:  PT    Re: Chester Palacios   :   1945    Recommendations:  This patient would benefit from continued therapy.     Frequency:  1 X week, once daily  Duration:  for 6 weeks    Thank you for your referral.    INQUIRIES  Therapist: Yessi David PT, [facility] FOR ATHLETIC MEDICINE [facility] PHYSICAL THERAPY  [address] [place] [address] [address]  [facility] [address]  Phone: [phone]  Fax: [phone]

## 2019-04-19 ENCOUNTER — TRANSFERRED RECORDS (OUTPATIENT)
Dept: HEALTH INFORMATION MANAGEMENT | Facility: CLINIC | Age: 74
End: 2019-04-19

## 2019-04-22 ENCOUNTER — OFFICE VISIT (OUTPATIENT)
Dept: FAMILY MEDICINE | Facility: CLINIC | Age: 74
End: 2019-04-22
Payer: COMMERCIAL

## 2019-04-22 VITALS
WEIGHT: 167 LBS | TEMPERATURE: 97.9 F | SYSTOLIC BLOOD PRESSURE: 120 MMHG | OXYGEN SATURATION: 98 % | BODY MASS INDEX: 23.91 KG/M2 | HEIGHT: 70 IN | RESPIRATION RATE: 20 BRPM | DIASTOLIC BLOOD PRESSURE: 76 MMHG | HEART RATE: 57 BPM

## 2019-04-22 DIAGNOSIS — H91.93 BILATERAL HEARING LOSS, UNSPECIFIED HEARING LOSS TYPE: ICD-10-CM

## 2019-04-22 DIAGNOSIS — H61.23 CERUMINOSIS, BILATERAL: Primary | ICD-10-CM

## 2019-04-22 PROCEDURE — 99207 ZZC NO CHARGE LOS: CPT | Performed by: INTERNAL MEDICINE

## 2019-04-22 PROCEDURE — 69210 REMOVE IMPACTED EAR WAX UNI: CPT | Performed by: INTERNAL MEDICINE

## 2019-04-22 RX ORDER — IBUPROFEN 200 MG
600 TABLET ORAL EVERY 4 HOURS PRN
COMMUNITY
End: 2020-11-16

## 2019-04-22 ASSESSMENT — MIFFLIN-ST. JEOR: SCORE: 1508.76

## 2019-04-22 NOTE — PROGRESS NOTES
"  SUBJECTIVE:   Chester Palacios is a 73 year old male who presents to clinic today for the following   health issues:      Check both ears-plugged?    Bilateral hearing aids.          Using debrox.             Reviewed  and updated as needed this visit by clinical staff  Tobacco  Allergies  Meds  Med Hx  Surg Hx  Fam Hx  Soc Hx        Reviewed and updated as needed this visit by Provider         Current Outpatient Medications   Medication Sig Dispense Refill     ACETAMINOPHEN PO Take 650 mg by mouth every 4 hours as needed for pain       amoxicillin (AMOXIL) 500 MG tablet Take 2000 mg 60 min prior to dental work 4 tablet 11     aspirin 81 MG tablet Take 81 mg by mouth every morning        atorvastatin (LIPITOR) 40 MG tablet TAKE 1 TABLET (40 MG) BY MOUTH DAILY 90 tablet 3     busPIRone (BUSPAR) 10 MG tablet Take by mouth 3 times daily 20 mg every morning, 10 mg every afternoon, 20 mg HS       diclofenac (VOLTAREN) 1 % GEL Place onto the skin as needed for moderate pain Patient uses prn on hands for playing gu[employer]iger [employer]sr. 100 g 3     DOXEPIN HCL PO Take by mouth At Bedtime Patient states that he takes 1.25 mL HS (12.5mg/1.25mL).       ibuprofen (ADVIL/MOTRIN) 200 MG tablet Take 600 mg by mouth every 4 hours as needed for mild pain       Allergies   Allergen Reactions     Animal Dander      cats     BP Readings from Last 3 Encounters:   04/22/19 120/76   10/10/18 126/64   05/04/18 126/74    Wt Readings from Last 3 Encounters:   04/22/19 75.8 kg (167 lb)   10/10/18 75.3 kg (166 lb)   05/04/18 73 kg (161 lb)                    ROS:      OBJECTIVE:                                                    /76 (BP Location: Left arm, Patient Position: Chair, Cuff Size: Adult Regular)   Pulse 57   Temp 97.9  F (36.6  C)   Resp 20   Ht 1.778 m (5' 10\")   Wt 75.8 kg (167 lb)   SpO2 98%   BMI 23.96 kg/m    Body mass index is 23.96 kg/m .  HENT: both ear canals occluded with soft wax    Diagnostic test results:  none  "     ASSESSMENT/PLAN:                                                        ICD-10-CM    1. Ceruminosis, bilateral H61.23    2. Bilateral hearing loss, unspecified hearing loss type H91.93        I removed the wax from the L ear canal with an alligator forceps.                  The nurse irrigated the R ear canal with good results.               Dariel Jack MD  [facility]

## 2019-04-23 ENCOUNTER — THERAPY VISIT (OUTPATIENT)
Dept: PHYSICAL THERAPY | Facility: CLINIC | Age: 74
End: 2019-04-23
Payer: COMMERCIAL

## 2019-04-23 DIAGNOSIS — M25.512 ACUTE PAIN OF LEFT SHOULDER: ICD-10-CM

## 2019-04-23 PROCEDURE — 97110 THERAPEUTIC EXERCISES: CPT | Mod: GP | Performed by: PHYSICAL THERAPIST

## 2019-04-23 PROCEDURE — 97035 APP MDLTY 1+ULTRASOUND EA 15: CPT | Mod: GP | Performed by: PHYSICAL THERAPIST

## 2019-04-23 PROCEDURE — 97140 MANUAL THERAPY 1/> REGIONS: CPT | Mod: GP | Performed by: PHYSICAL THERAPIST

## 2019-04-30 ENCOUNTER — THERAPY VISIT (OUTPATIENT)
Dept: PHYSICAL THERAPY | Facility: CLINIC | Age: 74
End: 2019-04-30
Payer: COMMERCIAL

## 2019-04-30 DIAGNOSIS — M25.512 ACUTE PAIN OF LEFT SHOULDER: ICD-10-CM

## 2019-04-30 PROCEDURE — 97110 THERAPEUTIC EXERCISES: CPT | Mod: GP | Performed by: PHYSICAL THERAPIST

## 2019-04-30 PROCEDURE — 97140 MANUAL THERAPY 1/> REGIONS: CPT | Mod: GP | Performed by: PHYSICAL THERAPIST

## 2019-04-30 PROCEDURE — 97035 APP MDLTY 1+ULTRASOUND EA 15: CPT | Mod: GP | Performed by: PHYSICAL THERAPIST

## 2019-05-01 ENCOUNTER — PRE VISIT (OUTPATIENT)
Dept: UROLOGY | Facility: CLINIC | Age: 74
End: 2019-05-01

## 2019-05-01 NOTE — TELEPHONE ENCOUNTER
Chief Complaint : Return-1yr     New Hx/Sx: Post TURP/BPH    Records/Orders/Proced: Available     Pt Contacted: N/A    At Rooming: Flow/PVR

## 2019-05-07 ENCOUNTER — THERAPY VISIT (OUTPATIENT)
Dept: PHYSICAL THERAPY | Facility: CLINIC | Age: 74
End: 2019-05-07
Payer: COMMERCIAL

## 2019-05-07 DIAGNOSIS — M25.512 ACUTE PAIN OF LEFT SHOULDER: ICD-10-CM

## 2019-05-07 PROCEDURE — 97140 MANUAL THERAPY 1/> REGIONS: CPT | Mod: GP | Performed by: PHYSICAL THERAPIST

## 2019-05-07 PROCEDURE — 97035 APP MDLTY 1+ULTRASOUND EA 15: CPT | Mod: GP | Performed by: PHYSICAL THERAPIST

## 2019-05-07 PROCEDURE — 97110 THERAPEUTIC EXERCISES: CPT | Mod: GP | Performed by: PHYSICAL THERAPIST

## 2019-05-14 ENCOUNTER — OFFICE VISIT (OUTPATIENT)
Dept: UROLOGY | Facility: CLINIC | Age: 74
End: 2019-05-14
Payer: COMMERCIAL

## 2019-05-14 VITALS
HEIGHT: 70 IN | SYSTOLIC BLOOD PRESSURE: 138 MMHG | WEIGHT: 163 LBS | BODY MASS INDEX: 23.34 KG/M2 | HEART RATE: 53 BPM | DIASTOLIC BLOOD PRESSURE: 72 MMHG | OXYGEN SATURATION: 97 %

## 2019-05-14 DIAGNOSIS — R35.1 BENIGN PROSTATIC HYPERPLASIA WITH NOCTURIA: Primary | ICD-10-CM

## 2019-05-14 DIAGNOSIS — N40.1 BENIGN PROSTATIC HYPERPLASIA WITH NOCTURIA: Primary | ICD-10-CM

## 2019-05-14 LAB — RESULT: 6

## 2019-05-14 PROCEDURE — 51741 ELECTRO-UROFLOWMETRY FIRST: CPT | Performed by: UROLOGY

## 2019-05-14 PROCEDURE — 99213 OFFICE O/P EST LOW 20 MIN: CPT | Mod: 25 | Performed by: UROLOGY

## 2019-05-14 PROCEDURE — 51798 US URINE CAPACITY MEASURE: CPT | Performed by: UROLOGY

## 2019-05-14 ASSESSMENT — MIFFLIN-ST. JEOR: SCORE: 1490.61

## 2019-05-14 ASSESSMENT — PAIN SCALES - GENERAL: PAINLEVEL: MILD PAIN (2)

## 2019-05-14 NOTE — PROGRESS NOTES
Office Visit Note      UROLOGIC DIAGNOSES:       CURRENT INTERVENTIONS:       HISTORY:   Patient presents for follow up for lower urinary tract symptoms.   He is s/p TUNA ~ ten years ago.   Now currently s/p TURP for lower urinary tract symptoms.   Post op course complicated by UTI.     Patient voiding better.   Patient currently notes that urgency and notes occasional urge incontinence on long walks (1-2x per month).   Sleeping better as nocturia has improved though continues to wake 2-4x per night.     Patient now states that he is pleased with his symptoms. Further improvement in nocturia noted and urgency has improved since above as well.         We reviewed symptoms, follow up schedule, PVR.           PAST MEDICAL HISTORY:   Past Medical History:   Diagnosis Date     Anxiety state 10/26/2012    Works with psychiatrist      Arthritis of right hip 12/21/2015     Complication of anesthesia     pt states yes, but unaware of complicaiton     Coronary artery calcification seen on computed tomography 12/12/2016    See Cardiology consult 1/17; cardiac MRI shows EF 60%, no scar       Family history of hemochromatosis 10/25/2015    Brother; Chester had nml labs in 2010      Hyperlipidemia with target LDL less than 130 10/26/2012     Diagnosis updated by automated process. Provider to review and confirm.     Lower urinary tract symptoms (LUTS) 11/5/2016    See urology consult 10/16       Mumps     ~1955     Osteoarthritis, unspecified osteoarthritis type, unspecified site 11/7/2016       PAST SURGICAL HISTORY:   Past Surgical History:   Procedure Laterality Date     CYSTOSCOPY       CYSTOSCOPY, TRANSURETHRAL RESECTION (TUR) PROSTATE, COMBINED N/A 3/8/2017    Procedure: COMBINED CYSTOSCOPY, TRANSURETHRAL RESECTION (TUR) PROSTATE;  Surgeon: Rosa Macias MD;  Location: [facility] OR     HERNIA REPAIR  1985    Inguinal     JOINT REPLACEMENT, HIP RT/LT Right 1/16    Joint Replacement Hip RT/LT     ORTHOPEDIC SURGERY      R  "elbow Orif     PROSTATE SURGERY      TUNA for BPH     TONSILLECTOMY      age 5       FAMILY HISTORY:   Family History   Problem Relation Age of Onset     Heart Disease Father          age 56; had chest pain,  in the hospital soon after; was a smoker     Genetic Disorder Brother         +HFE gene mutation, but does not have hemochromatosis, but has phlebotomies     Blood Disease Mother         hemolytic anemia;  age 85       SOCIAL HISTORY:   Social History     Tobacco Use     Smoking status: Never Smoker     Smokeless tobacco: Never Used   Substance Use Topics     Alcohol use: Yes     Comment: occ.       Current Outpatient Medications   Medication     amoxicillin (AMOXIL) 500 MG tablet     aspirin 81 MG tablet     atorvastatin (LIPITOR) 40 MG tablet     busPIRone (BUSPAR) 10 MG tablet     DOXEPIN HCL PO     ibuprofen (ADVIL/MOTRIN) 200 MG tablet     ACETAMINOPHEN PO     diclofenac (VOLTAREN) 1 % GEL     No current facility-administered medications for this visit.          PHYSICAL EXAM:    /72   Pulse 53   Ht 1.778 m (5' 10\")   Wt 73.9 kg (163 lb)   SpO2 97%   BMI 23.39 kg/m      HEENT: Normocephalic and atraumatic   Cardiac: Not done  Back/Flank: Not done  CNS/PNS: Not done  Respiratory: Normal non-labored breathing  Abdomen: Soft nontender and nondistended  Peripheral Vascular: Not done  Mental Status: Not done    Penis: Not done  Scrotal Skin: Not done  Testicles: Not done  Epididymis: Not done  Digital Rectal Exam:     Cystoscopy:           Imaging: None    Urinalysis: [facility] RESULTS:  Recent Labs   Lab Test  16   1020   COLOR  Yellow   APPEARANCE  Clear   URINEGLC  Negative   URINEBILI  Negative   URINEKETONE  Negative   SG  1.025   UBLD  Trace*   URINEPH  6.0   PROTEIN  Negative   UROBILINOGEN  0.2   NITRITE  Negative   LEUKEST  Negative   RBCU  O - 2   WBCU  O - 2       PSA: 1.1  Qmax 10ml/s   VV: 103  Post Void Residual:  6ml     Other labs: None today      IMPRESSION:  71 y/o M " with lower urinary tract symptoms now status post TURP complicated by UTI now resolved     PLAN:  Uroflow/PVR in one year   Follow up in one year     Total Time: 15 minutes                                     Total in Consultation: greater than 50%

## 2019-05-14 NOTE — LETTER
5/14/2019     RE: Chester Palacios  [address]  [facility] [address]     Dear Colleague,    Thank you for referring your patient, Chester Palacios, to the [facility] UROLOGY CLINIC [place] [facility]. Please see a copy of my visit note below.    Office Visit Note      UROLOGIC DIAGNOSES:       CURRENT INTERVENTIONS:       HISTORY:   Patient presents for follow up for lower urinary tract symptoms.   He is s/p TUNA ~ ten years ago.   Now currently s/p TURP for lower urinary tract symptoms.   Post op course complicated by UTI.     Patient voiding better.   Patient currently notes that urgency and notes occasional urge incontinence on long walks (1-2x per month).   Sleeping better as nocturia has improved though continues to wake 2-4x per night.     Patient now states that he is pleased with his symptoms. Further improvement in nocturia noted and urgency has improved since above as well.         We reviewed symptoms, follow up schedule, PVR.           PAST MEDICAL HISTORY:   Past Medical History:   Diagnosis Date     Anxiety state 10/26/2012    Works with psychiatrist      Arthritis of right hip 12/21/2015     Complication of anesthesia     pt states yes, but unaware of complicaiton     Coronary artery calcification seen on computed tomography 12/12/2016    See Cardiology consult 1/17; cardiac MRI shows EF 60%, no scar       Family history of hemochromatosis 10/25/2015    Brother; Chester had nml labs in 2010      Hyperlipidemia with target LDL less than 130 10/26/2012     Diagnosis updated by automated process. Provider to review and confirm.     Lower urinary tract symptoms (LUTS) 11/5/2016    See urology consult 10/16       Mumps     ~1955     Osteoarthritis, unspecified osteoarthritis type, unspecified site 11/7/2016       PAST SURGICAL HISTORY:   Past Surgical History:   Procedure Laterality Date     CYSTOSCOPY       CYSTOSCOPY, TRANSURETHRAL  "RESECTION (TUR) PROSTATE, COMBINED N/A 3/8/2017    Procedure: COMBINED CYSTOSCOPY, TRANSURETHRAL RESECTION (TUR) PROSTATE;  Surgeon: Rosa Macias MD;  Location: [facility] OR     HERNIA REPAIR      Inguinal     JOINT REPLACEMENT, HIP RT/LT Right     Joint Replacement Hip RT/LT     ORTHOPEDIC SURGERY      R elbow Orif     PROSTATE SURGERY      TUNA for BPH     TONSILLECTOMY      age 5       FAMILY HISTORY:   Family History   Problem Relation Age of Onset     Heart Disease Father          age 56; had chest pain,  in the hospital soon after; was a smoker     Genetic Disorder Brother         +HFE gene mutation, but does not have hemochromatosis, but has phlebotomies     Blood Disease Mother         hemolytic anemia;  age 85       SOCIAL HISTORY:   Social History     Tobacco Use     Smoking status: Never Smoker     Smokeless tobacco: Never Used   Substance Use Topics     Alcohol use: Yes     Comment: occ.       Current Outpatient Medications   Medication     amoxicillin (AMOXIL) 500 MG tablet     aspirin 81 MG tablet     atorvastatin (LIPITOR) 40 MG tablet     busPIRone (BUSPAR) 10 MG tablet     DOXEPIN HCL PO     ibuprofen (ADVIL/MOTRIN) 200 MG tablet     ACETAMINOPHEN PO     diclofenac (VOLTAREN) 1 % GEL     No current facility-administered medications for this visit.          PHYSICAL EXAM:    /72   Pulse 53   Ht 1.778 m (5' 10\")   Wt 73.9 kg (163 lb)   SpO2 97%   BMI 23.39 kg/m       HEENT: Normocephalic and atraumatic   Cardiac: Not done  Back/Flank: Not done  CNS/PNS: Not done  Respiratory: Normal non-labored breathing  Abdomen: Soft nontender and nondistended  Peripheral Vascular: Not done  Mental Status: Not done    Penis: Not done  Scrotal Skin: Not done  Testicles: Not done  Epididymis: Not done  Digital Rectal Exam:     Cystoscopy:           Imaging: None    Urinalysis: [facility] RESULTS:  Recent Labs   Lab Test  16   1020   COLOR  Yellow   APPEARANCE  Clear   URINEGLC  " Negative   URINEBILI  Negative   URINEKETONE  Negative   SG  1.025   UBLD  Trace*   URINEPH  6.0   PROTEIN  Negative   UROBILINOGEN  0.2   NITRITE  Negative   LEUKEST  Negative   RBCU  O - 2   WBCU  O - 2       PSA: 1.1  Qmax 10ml/s   VV: 103  Post Void Residual:  6ml     Other labs: None today      IMPRESSION:  73 y/o M with lower urinary tract symptoms now status post TURP complicated by UTI now resolved     PLAN:  Uroflow/PVR in one year   Follow up in one year     Total Time: 15 minutes                                     Total in Consultation: greater than 50%       Rosa Macias MD

## 2019-05-14 NOTE — NURSING NOTE
"Chief Complaint   Patient presents with     Follow Up     Pt here for annual followup with UROFLO/PVR     Benign Prostatic Hypertrophy       Blood pressure 138/72, pulse 53, height 1.778 m (5' 10\"), weight 73.9 kg (163 lb), SpO2 97 %. Body mass index is 23.39 kg/m .    Patient Active Problem List   Diagnosis     Primary localized osteoarthrosis, pelvic region and thigh     Osteoarthritis     Insomnia     Anxiety state     Hearing loss     Preventive measure     Family history of hemochromatosis     ACP (advance care planning)     Arthritis of right hip     Arthritis of both hands     Nonspecific abnormal electrocardiogram (ECG) (EKG)     Lower urinary tract symptoms (LUTS)     Osteoarthritis, unspecified osteoarthritis type, unspecified site     Neutropenia, unspecified type (H)     IFG (impaired fasting glucose)     Abnormal echocardiogram     Coronary artery calcification seen on computed tomography     BPH (benign prostatic hyperplasia)     Hyperlipidemia LDL goal <100     Right shoulder pain, unspecified chronicity     Preventive antibiotic     Achilles tendinosis     Achilles tendinosis of left lower extremity     Shoulder pain, left       Allergies   Allergen Reactions     Animal Dander      cats       Current Outpatient Medications   Medication Sig Dispense Refill     amoxicillin (AMOXIL) 500 MG tablet Take 2000 mg 60 min prior to dental work 4 tablet 11     aspirin 81 MG tablet Take 81 mg by mouth every morning        atorvastatin (LIPITOR) 40 MG tablet TAKE 1 TABLET (40 MG) BY MOUTH DAILY 90 tablet 3     busPIRone (BUSPAR) 10 MG tablet Take by mouth 3 times daily 20 mg every morning, 10 mg every afternoon, 20 mg HS       DOXEPIN HCL PO Take by mouth At Bedtime Patient states that he takes 1.25 mL HS (12.5mg/1.25mL).       ibuprofen (ADVIL/MOTRIN) 200 MG tablet Take 600 mg by mouth every 4 hours as needed for mild pain       ACETAMINOPHEN PO Take 650 mg by mouth every 4 hours as needed for pain       " diclofenac (VOLTAREN) 1 % GEL Place onto the skin as needed for moderate pain Patient uses prn on hands for playing guitar. (Patient not taking: Reported on 5/14/2019) 100 g 3       Social History     Tobacco Use     Smoking status: Never Smoker     Smokeless tobacco: Never Used   Substance Use Topics     Alcohol use: Yes     Comment: occ.     Drug use: No       Vincent Bergeron, EMT  5/14/2019       PVR result is 06 ml    Patient was informed, and cleaned after the Ultrasound

## 2019-05-21 ENCOUNTER — THERAPY VISIT (OUTPATIENT)
Dept: PHYSICAL THERAPY | Facility: CLINIC | Age: 74
End: 2019-05-21
Payer: COMMERCIAL

## 2019-05-21 DIAGNOSIS — M25.512 ACUTE PAIN OF LEFT SHOULDER: ICD-10-CM

## 2019-05-21 PROCEDURE — 97112 NEUROMUSCULAR REEDUCATION: CPT | Mod: GP | Performed by: PHYSICAL THERAPIST

## 2019-05-21 PROCEDURE — 97110 THERAPEUTIC EXERCISES: CPT | Mod: GP | Performed by: PHYSICAL THERAPIST

## 2019-05-21 NOTE — LETTER
[facility] ATHLETIC [facility]  [address] [facility] [address]  [facility] [id number] [phone]    May 22, 2019    Re: Chester Palacios   :   1945  MRN:  [id number]   REFERRING PHYSICIAN:   Armen Rosas    [facility] ATHLETIC [facility]    Date of Initial Evaluation:  3-15-19  Visits:  Rxs Used: 10  Reason for Referral:  Acute pain of left shoulder    EVALUATION SUMMARY    PROGRESS  REPORT  Progress reporting period is from 19 to 19.       SUBJECTIVE  Subjective: Thinks he is improving but not consistently     Current Pain level: 2/10.     Previous pain level was  4/10.   Changes in function:  Yes (See Goal flowsheet attached for changes in current functional level)  Adverse reaction to treatment or activity: None    OBJECTIVE  Changes noted in objective findings:  The objective findings below are from DOS 19.  Objective: Has been able to ride his bike 6 miles. Walks 3 miles no pain. Still slight pain at 90 -100 dg in abduction PROM is pain free. Advanced the exs discussed  trying to go back to the pool and try a little short non painful swim with breast stroke. Advanced the exs. RTC in 2 weeks he will be going to [place]  on .     ASSESSMENT/PLAN  Updated problem list and treatment plan: Diagnosis 1:  L shoulder pain  Pain -  hot/cold therapy, US and manual therapy  Decreased ROM/flexibility - manual therapy and therapeutic exercise  Decreased strength - therapeutic exercise and therapeutic activities  Impaired muscle performance - neuro re-education  Decreased function - therapeutic activities  STG/LTGs have been met or progress has been made towards goals:  Yes (See Goal flow sheet completed today.)  Assessment of Progress: The patient's condition is improving.  Self Management Plans:  Patient has been instructed in a home treatment program.  I have re-evaluated this patient and find that the nature, scope, duration  and intensity of the therapy is appropriate for the medical condition of the patient.  Chester continues to require the following intervention to meet STG and LTG's:  PT    Re: Chester Palacios   :   1945    Recommendations:  This patient would benefit from continued therapy.     Frequency:  2 X a month, once daily  Duration:  for 3 months    Thank you for your referral.    INQUIRIES  Therapist: Yessi David PT, [facility] FOR ATHLETIC MEDICINE [facility] PHYSICAL THERAPY  [address] [place] [address] [phone]  [facility] [address]  Phone: [phone]  Fax: [phone]

## 2019-05-21 NOTE — PROGRESS NOTES
PROGRESS  REPORT    Progress reporting period is from 4-16-19 to 5-21-19.       SUBJECTIVE    Subjective: Thinks he is improving but not consistently     Current Pain level: 2/10.     Previous pain level was  4/10  .   Changes in function:  Yes (See Goal flowsheet attached for changes in current functional level)  Adverse reaction to treatment or activity: None    OBJECTIVE  Changes noted in objective findings:  The objective findings below are from DOS 5-21-19.  Objective: Has been able to ride his bike 6 miles. Walks 3 miles no pain. Still slight pain at 90 -100 dg in abduction PROM is pain free. Advanced the exs discussed  trying to go back to the pool and try a little short non painful swim with breast stroke. Advanced the exs. RTC in 2 weeks he will be going to [place]  on June 10th.     ASSESSMENT/PLAN  Updated problem list and treatment plan: Diagnosis 1:  L shoulder pain  Pain -  hot/cold therapy, US and manual therapy  Decreased ROM/flexibility - manual therapy and therapeutic exercise  Decreased strength - therapeutic exercise and therapeutic activities  Impaired muscle performance - neuro re-education  Decreased function - therapeutic activities  STG/LTGs have been met or progress has been made towards goals:  Yes (See Goal flow sheet completed today.)  Assessment of Progress: The patient's condition is improving.  Self Management Plans:  Patient has been instructed in a home treatment program.  I have re-evaluated this patient and find that the nature, scope, duration and intensity of the therapy is appropriate for the medical condition of the patient.  Chester continues to require the following intervention to meet STG and LTG's:  PT    Recommendations:  This patient would benefit from continued therapy.     Frequency:  2 X a month, once daily  Duration:  for 3 months        Please refer to the daily flowsheet for treatment today, total treatment time and time spent performing 1:1 timed codes.

## 2019-05-29 NOTE — TELEPHONE ENCOUNTER
Patient is coming in to see  for a 1 month follow up for LUTS, called patient to please come with a full bladder for a flow/PVR.   Per the pharmacy they already received a phone call this morning regarding the clarification

## 2019-06-04 ENCOUNTER — THERAPY VISIT (OUTPATIENT)
Dept: PHYSICAL THERAPY | Facility: CLINIC | Age: 74
End: 2019-06-04
Payer: COMMERCIAL

## 2019-06-04 DIAGNOSIS — M25.512 ACUTE PAIN OF LEFT SHOULDER: ICD-10-CM

## 2019-06-04 PROCEDURE — 97112 NEUROMUSCULAR REEDUCATION: CPT | Mod: GP | Performed by: PHYSICAL THERAPIST

## 2019-06-04 PROCEDURE — 97110 THERAPEUTIC EXERCISES: CPT | Mod: GP | Performed by: PHYSICAL THERAPIST

## 2019-06-28 ENCOUNTER — TELEPHONE (OUTPATIENT)
Dept: FAMILY MEDICINE | Facility: CLINIC | Age: 74
End: 2019-06-28

## 2019-06-28 DIAGNOSIS — Z79.2 PREVENTIVE ANTIBIOTIC: ICD-10-CM

## 2019-06-28 RX ORDER — AMOXICILLIN 500 MG/1
TABLET, FILM COATED ORAL
Qty: 4 TABLET | Refills: 11 | Status: SHIPPED | OUTPATIENT
Start: 2019-06-28 | End: 2020-08-26

## 2019-06-28 NOTE — TELEPHONE ENCOUNTER
Reason for Call:  Medication or medication refill:    Do you use a [facility]?  Name of the pharmacy and phone number for the current request:  [facility]    Name of the medication requested: amoxicillin 500mg    Other request: Pt has upcoming dental appt and has had hip replacement.  He would like to  rx today.     Can we leave a detailed message on this number? YES    Phone number patient can be reached at: Home number on file [phone] (home)    Best Time: any    Call taken on 6/28/2019 at 9:42 AM by LARRY PARK

## 2019-07-09 ENCOUNTER — THERAPY VISIT (OUTPATIENT)
Dept: PHYSICAL THERAPY | Facility: CLINIC | Age: 74
End: 2019-07-09
Payer: COMMERCIAL

## 2019-07-09 DIAGNOSIS — M25.512 ACUTE PAIN OF LEFT SHOULDER: ICD-10-CM

## 2019-07-09 PROCEDURE — 97112 NEUROMUSCULAR REEDUCATION: CPT | Mod: GP | Performed by: PHYSICAL THERAPIST

## 2019-07-09 PROCEDURE — 97110 THERAPEUTIC EXERCISES: CPT | Mod: GP | Performed by: PHYSICAL THERAPIST

## 2019-07-09 NOTE — LETTER
[facility] ATHLETIC [facility] PHYSICAL [facility]  [address] [address]  [facility] [id number] [phone]    2019    Re: Chester Palacios   :   1945  MRN:  [id number]   REFERRING PHYSICIAN:   Armen Rosas    [facility] ATHLETIC [facility] PHYSICAL [facility]  Date of Initial Evaluation: 3/15/2019  Visits:  Rxs Used: 12  Reason for Referral:  Acute pain of left shoulder    EVALUATION SUMMARY    PROGRESS  REPORT    Progress reporting period is from 19 to 19.       SUBJECTIVE  Subjective: Still having intermittent pain and limitations with his shoulder     Current Pain level: 210.     Previous pain level was  2/10  .   Changes in function:  Yes (See Goal flowsheet attached for changes in current functional level)  Adverse reaction to treatment or activity: None    OBJECTIVE  Changes noted in objective findings:  The objective findings below are from DOS 19.  Objective: Has played tennis 1x but is serving underhanded He thought the exs were going very well and pain free until last pm he had pain with abduction. AROM slight pain at  dg of abduction.Flexion pain free extension painfree ext/ir pain free. I advanced the exs becasue the inflammation seems to be down now he needs more strength. PROM was wnl and painfree except at the end of flexion.Had poor technique with ER and scaption.Plan to see back in 2.5 weeks     ASSESSMENT/PLAN  Updated problem list and treatment plan: Diagnosis 1:  L shoulder pain  Pain -  hot/cold therapy, US and manual therapy  Decreased strength - therapeutic exercise and therapeutic activities  Impaired muscle performance - neuro re-education  Decreased function - therapeutic activities  STG/LTGs have been met or progress has been made towards goals:  Yes (See Goal flow sheet completed today.)  Assessment of Progress: The patient's condition is improving.  Self Management Plans:  Patient has been instructed in a home  treatment program.  I have re-evaluated this patient and find that the nature, scope, duration and intensity of the therapy is appropriate for the medical condition of the patient.  Chester continues to require the following intervention to meet STG and LTG's:  PT    Re: Chester Palacios   :   1945    Recommendations:  This patient would benefit from continued therapy.     Frequency:  2 X a month, once daily  Duration:  for 2 months      Thank you for your referral.    INQUIRIES  Therapist: Yessi David, PT  [facility] PHYSICAL THERAPY  [address] [place] [address] [id number]  [facility] [address]  Phone: [phone]  Fax: [phone]

## 2019-07-09 NOTE — PROGRESS NOTES
PROGRESS  REPORT    Progress reporting period is from 5-21-19 to 7-9-19.       SUBJECTIVE  Subjective: Still having intermittent pain and limitations with his shoulder     Current Pain level: 210.     Previous pain level was  2/10  .   Changes in function:  Yes (See Goal flowsheet attached for changes in current functional level)  Adverse reaction to treatment or activity: None    OBJECTIVE  Changes noted in objective findings:  The objective findings below are from DOS 7-9-19.  Objective: Has played tennis 1x but is serving underhanded He thought the exs were going very well and pain free until last pm he had pain with abduction. AROM slight pain at  dg of abduction.Flexion pain free extension painfree ext/ir pain free. I advanced the exs becasue the inflammation seems to be down now he needs more strength. PROM was wnl and painfree except at the end of flexion.Had poor technique with ER and scaption.Plan to see back in 2.5 weeks     ASSESSMENT/PLAN  Updated problem list and treatment plan: Diagnosis 1:  L shoulder pain  Pain -  hot/cold therapy, US and manual therapy  Decreased strength - therapeutic exercise and therapeutic activities  Impaired muscle performance - neuro re-education  Decreased function - therapeutic activities  STG/LTGs have been met or progress has been made towards goals:  Yes (See Goal flow sheet completed today.)  Assessment of Progress: The patient's condition is improving.  Self Management Plans:  Patient has been instructed in a home treatment program.  I have re-evaluated this patient and find that the nature, scope, duration and intensity of the therapy is appropriate for the medical condition of the patient.  Chester continues to require the following intervention to meet STG and LTG's:  PT    Recommendations:  This patient would benefit from continued therapy.     Frequency:  2 X a month, once daily  Duration:  for 2 months        Please refer to the daily flowsheet for treatment  today, total treatment time and time spent performing 1:1 timed codes.

## 2019-07-23 ENCOUNTER — THERAPY VISIT (OUTPATIENT)
Dept: PHYSICAL THERAPY | Facility: CLINIC | Age: 74
End: 2019-07-23
Payer: COMMERCIAL

## 2019-07-23 DIAGNOSIS — M25.512 ACUTE PAIN OF LEFT SHOULDER: ICD-10-CM

## 2019-07-23 PROCEDURE — 97112 NEUROMUSCULAR REEDUCATION: CPT | Mod: GP | Performed by: PHYSICAL THERAPIST

## 2019-07-23 PROCEDURE — 97110 THERAPEUTIC EXERCISES: CPT | Mod: GP | Performed by: PHYSICAL THERAPIST

## 2019-08-13 ENCOUNTER — THERAPY VISIT (OUTPATIENT)
Dept: PHYSICAL THERAPY | Facility: CLINIC | Age: 74
End: 2019-08-13
Payer: COMMERCIAL

## 2019-08-13 DIAGNOSIS — M25.512 ACUTE PAIN OF LEFT SHOULDER: ICD-10-CM

## 2019-08-13 PROCEDURE — 97112 NEUROMUSCULAR REEDUCATION: CPT | Mod: GP | Performed by: PHYSICAL THERAPIST

## 2019-08-13 PROCEDURE — 97110 THERAPEUTIC EXERCISES: CPT | Mod: GP | Performed by: PHYSICAL THERAPIST

## 2019-08-13 NOTE — PROGRESS NOTES
PROGRESS  REPORT    Progress reporting period is from 7-9-19 to 8-13-19.       SUBJECTIVE   Subjective: Reports he is feeling better. Has been riding bike 2-3 miles with no pain    Current Pain level: 1/10.     Previous pain level was  3/10  .   Changes in function:  Yes (See Goal flowsheet attached for changes in current functional level)  Adverse reaction to treatment or activity: None    OBJECTIVE  Changes noted in objective findings:  The objective findings below are from DOS 8-13-19.  Objective: Thinks the past few days have been better he was on a canoe trip and thought he was uncomfortable. Sometimes the exs can be irritating but not as much recently. AROM no pain except for abd at around 110 dg. PROM painfree..Pt is tolerating the exercise program better. He is doing his exs 2x day I cut them back to 1x day except posterior capsule. He wants to swim but I instructed him that he needs full painfree rom in abduction and flexion before he can try that again.      ASSESSMENT/PLAN  Updated problem list and treatment plan: Diagnosis 1:  L shoulder pain  Pain -  hot/cold therapy and manual therapy  Decreased ROM/flexibility - manual therapy and therapeutic exercise  Decreased strength - therapeutic exercise and therapeutic activities  Impaired muscle performance - neuro re-education  Decreased function - therapeutic activities  STG/LTGs have been met or progress has been made towards goals:  Yes (See Goal flow sheet completed today.)  Assessment of Progress: The patient's condition is improving.  Self Management Plans:  Patient has been instructed in a home treatment program.  I have re-evaluated this patient and find that the nature, scope, duration and intensity of the therapy is appropriate for the medical condition of the patient.  Chester continues to require the following intervention to meet STG and LTG's:  PT    Recommendations:  This patient would benefit from continued therapy.     Frequency:  2 X a month,  once daily  Duration:  for 2 months        Please refer to the daily flowsheet for treatment today, total treatment time and time spent performing 1:1 timed codes.

## 2019-08-21 ENCOUNTER — TRANSFERRED RECORDS (OUTPATIENT)
Dept: HEALTH INFORMATION MANAGEMENT | Facility: CLINIC | Age: 74
End: 2019-08-21

## 2019-09-03 ENCOUNTER — THERAPY VISIT (OUTPATIENT)
Dept: PHYSICAL THERAPY | Facility: CLINIC | Age: 74
End: 2019-09-03
Payer: COMMERCIAL

## 2019-09-03 DIAGNOSIS — M25.512 CHRONIC LEFT SHOULDER PAIN: ICD-10-CM

## 2019-09-03 DIAGNOSIS — G89.29 CHRONIC LEFT SHOULDER PAIN: ICD-10-CM

## 2019-09-03 PROCEDURE — 97112 NEUROMUSCULAR REEDUCATION: CPT | Mod: GP | Performed by: PHYSICAL THERAPIST

## 2019-09-03 PROCEDURE — 97110 THERAPEUTIC EXERCISES: CPT | Mod: GP | Performed by: PHYSICAL THERAPIST

## 2019-09-17 ENCOUNTER — THERAPY VISIT (OUTPATIENT)
Dept: PHYSICAL THERAPY | Facility: CLINIC | Age: 74
End: 2019-09-17
Payer: COMMERCIAL

## 2019-09-17 DIAGNOSIS — G89.29 CHRONIC LEFT SHOULDER PAIN: ICD-10-CM

## 2019-09-17 DIAGNOSIS — M25.512 CHRONIC LEFT SHOULDER PAIN: ICD-10-CM

## 2019-09-17 PROCEDURE — 97112 NEUROMUSCULAR REEDUCATION: CPT | Mod: GP | Performed by: PHYSICAL THERAPIST

## 2019-09-17 PROCEDURE — 97110 THERAPEUTIC EXERCISES: CPT | Mod: GP | Performed by: PHYSICAL THERAPIST

## 2019-10-01 ENCOUNTER — THERAPY VISIT (OUTPATIENT)
Dept: PHYSICAL THERAPY | Facility: CLINIC | Age: 74
End: 2019-10-01
Payer: COMMERCIAL

## 2019-10-01 DIAGNOSIS — G89.29 CHRONIC LEFT SHOULDER PAIN: ICD-10-CM

## 2019-10-01 DIAGNOSIS — M25.512 CHRONIC LEFT SHOULDER PAIN: ICD-10-CM

## 2019-10-01 PROCEDURE — 97112 NEUROMUSCULAR REEDUCATION: CPT | Mod: GP | Performed by: PHYSICAL THERAPIST

## 2019-10-01 PROCEDURE — 97110 THERAPEUTIC EXERCISES: CPT | Mod: GP | Performed by: PHYSICAL THERAPIST

## 2019-10-01 NOTE — PROGRESS NOTES
PROGRESS  REPORT    Progress reporting period is from 8-13-19 to 10-1-19.       SUBJECTIVE    Subjective: Thinks he is still improving cleaned his bike chain and got sore that night    Current Pain level: 1/10.     Previous pain level was  3/10  .   Changes in function:  Yes (See Goal flowsheet attached for changes in current functional level)  Adverse reaction to treatment or activity: None    OBJECTIVE  Changes noted in objective findings:  The objective findings below are from DOS 10-1-19.  Objective: Had a liitle pain with tennis. Did not go back to swimming yet due to cold. AROM fairly summetrical slight discomfort with abduction. PROM still slight pain at end of flexion and abduction. Advanced the exs further. RTC 3 weeks     ASSESSMENT/PLAN  Updated problem list and treatment plan: Diagnosis 1:  L shoulder pain  Pain -  hot/cold therapy and manual therapy  Decreased ROM/flexibility - manual therapy and therapeutic exercise  Decreased strength - therapeutic exercise and therapeutic activities  Impaired muscle performance - neuro re-education  Decreased function - therapeutic activities  STG/LTGs have been met or progress has been made towards goals:  Yes (See Goal flow sheet completed today.)  Assessment of Progress: The patient's condition is improving.  Self Management Plans:  Patient has been instructed in a home treatment program.  I have re-evaluated this patient and find that the nature, scope, duration and intensity of the therapy is appropriate for the medical condition of the patient.  Chester continues to require the following intervention to meet STG and LTG's:  PT    Recommendations:  This patient would benefit from continued therapy.     Frequency:  1 X per 3 weeks, once daily  Duration:  for 2 months        Please refer to the daily flowsheet for treatment today, total treatment time and time spent performing 1:1 timed codes.

## 2019-10-01 NOTE — LETTER
[facility] ATHLETIC [facility] PHYSICAL [facility]  [address] [facility] [facility] [address]  [facility] [id number] [phone]    2019    Re: Chester Palacios   :   1945  MRN:  [id number]   REFERRING PHYSICIAN:   Armen Rosas    [facility]    Date of Initial Evaluation:  3-15-19  Visits:     Reason for Referral:  Chronic left shoulder pain    EVALUATION SUMMARY    PROGRESS  REPORT  Progress reporting period is from 19 to 10-1-19.       SUBJECTIVE  Subjective: Thinks he is still improving cleaned his bike chain and got sore that night    Current Pain level: 1/10.     Previous pain level was  3/10.   Changes in function:  Yes (See Goal flowsheet attached for changes in current functional level)  Adverse reaction to treatment or activity: None    OBJECTIVE  Changes noted in objective findings:  The objective findings below are from DOS 10-1-19.  Objective: Had a liitle pain with tennis. Did not go back to swimming yet due to cold. AROM fairly summetrical slight discomfort with abduction. PROM still slight pain at end of flexion and abduction. Advanced the exs further. RTC 3 weeks     ASSESSMENT/PLAN  Updated problem list and treatment plan: Diagnosis 1:  L shoulder pain  Pain -  hot/cold therapy and manual therapy  Decreased ROM/flexibility - manual therapy and therapeutic exercise  Decreased strength - therapeutic exercise and therapeutic activities  Impaired muscle performance - neuro re-education  Decreased function - therapeutic activities  STG/LTGs have been met or progress has been made towards goals:  Yes (See Goal flow sheet completed today.)  Assessment of Progress: The patient's condition is improving.  Self Management Plans:  Patient has been instructed in a home treatment program.  I have re-evaluated this patient and find that the nature, scope, duration and intensity of the therapy is appropriate for the medical condition of  the patientSarah Briggs continues to require the following intervention to meet STG and LTG's:  PT      Re: Chester Palacios   :   1945    Recommendations:  This patient would benefit from continued therapy.     Frequency:  1 X per 3 weeks, once daily  Duration:  for 2 months    Thank you for your referral.    INQUIRIES  Therapist: Yessi David, PT   [facility] PHYSICAL THERAPY  [address] [place] [address] [id number]  [facility] [address]  Phone: [phone]  Fax: [phone]

## 2019-10-02 ENCOUNTER — HEALTH MAINTENANCE LETTER (OUTPATIENT)
Age: 74
End: 2019-10-02

## 2019-10-22 ENCOUNTER — THERAPY VISIT (OUTPATIENT)
Dept: PHYSICAL THERAPY | Facility: CLINIC | Age: 74
End: 2019-10-22
Payer: COMMERCIAL

## 2019-10-22 DIAGNOSIS — M25.512 CHRONIC LEFT SHOULDER PAIN: ICD-10-CM

## 2019-10-22 DIAGNOSIS — G89.29 CHRONIC LEFT SHOULDER PAIN: ICD-10-CM

## 2019-10-22 PROCEDURE — 97112 NEUROMUSCULAR REEDUCATION: CPT | Mod: GP | Performed by: PHYSICAL THERAPIST

## 2019-10-22 PROCEDURE — 97110 THERAPEUTIC EXERCISES: CPT | Mod: GP | Performed by: PHYSICAL THERAPIST

## 2019-10-31 ENCOUNTER — TRANSFERRED RECORDS (OUTPATIENT)
Dept: MULTI SPECIALTY CLINIC | Facility: CLINIC | Age: 74
End: 2019-10-31

## 2019-11-05 ENCOUNTER — OFFICE VISIT (OUTPATIENT)
Dept: FAMILY MEDICINE | Facility: CLINIC | Age: 74
End: 2019-11-05
Payer: COMMERCIAL

## 2019-11-05 VITALS
DIASTOLIC BLOOD PRESSURE: 74 MMHG | SYSTOLIC BLOOD PRESSURE: 130 MMHG | HEIGHT: 70 IN | WEIGHT: 164 LBS | HEART RATE: 54 BPM | BODY MASS INDEX: 23.48 KG/M2 | RESPIRATION RATE: 20 BRPM | OXYGEN SATURATION: 96 %

## 2019-11-05 DIAGNOSIS — R39.9 LOWER URINARY TRACT SYMPTOMS (LUTS): ICD-10-CM

## 2019-11-05 DIAGNOSIS — Z00.00 ROUTINE HISTORY AND PHYSICAL EXAMINATION OF ADULT: Primary | ICD-10-CM

## 2019-11-05 DIAGNOSIS — Z12.5 SCREENING FOR PROSTATE CANCER: ICD-10-CM

## 2019-11-05 DIAGNOSIS — E78.5 HYPERLIPIDEMIA LDL GOAL <100: ICD-10-CM

## 2019-11-05 DIAGNOSIS — H91.93 BILATERAL HEARING LOSS, UNSPECIFIED HEARING LOSS TYPE: ICD-10-CM

## 2019-11-05 LAB
ALBUMIN SERPL-MCNC: 4 G/DL (ref 3.4–5)
ALBUMIN UR-MCNC: NEGATIVE MG/DL
ALP SERPL-CCNC: 116 U/L (ref 40–150)
ALT SERPL W P-5'-P-CCNC: 29 U/L (ref 0–70)
ANION GAP SERPL CALCULATED.3IONS-SCNC: 8 MMOL/L (ref 3–14)
APPEARANCE UR: CLEAR
AST SERPL W P-5'-P-CCNC: 22 U/L (ref 0–45)
BILIRUB SERPL-MCNC: 0.6 MG/DL (ref 0.2–1.3)
BILIRUB UR QL STRIP: NEGATIVE
BUN SERPL-MCNC: 11 MG/DL (ref 7–30)
CALCIUM SERPL-MCNC: 9.1 MG/DL (ref 8.5–10.1)
CHLORIDE SERPL-SCNC: 106 MMOL/L (ref 94–109)
CHOLEST SERPL-MCNC: 172 MG/DL
CO2 SERPL-SCNC: 28 MMOL/L (ref 20–32)
COLOR UR AUTO: YELLOW
CREAT SERPL-MCNC: 0.9 MG/DL (ref 0.66–1.25)
GFR SERPL CREATININE-BSD FRML MDRD: 84 ML/MIN/{1.73_M2}
GLUCOSE SERPL-MCNC: 88 MG/DL (ref 70–99)
GLUCOSE UR STRIP-MCNC: NEGATIVE MG/DL
HDLC SERPL-MCNC: 49 MG/DL
HGB UR QL STRIP: ABNORMAL
KETONES UR STRIP-MCNC: NEGATIVE MG/DL
LDLC SERPL CALC-MCNC: 112 MG/DL
LEUKOCYTE ESTERASE UR QL STRIP: NEGATIVE
NITRATE UR QL: NEGATIVE
NONHDLC SERPL-MCNC: 123 MG/DL
PH UR STRIP: 7.5 PH (ref 5–7)
POTASSIUM SERPL-SCNC: 3.7 MMOL/L (ref 3.4–5.3)
PROT SERPL-MCNC: 7.9 G/DL (ref 6.8–8.8)
PSA SERPL-ACNC: 1.44 UG/L (ref 0–4)
RBC #/AREA URNS AUTO: ABNORMAL /HPF
SODIUM SERPL-SCNC: 142 MMOL/L (ref 133–144)
SOURCE: ABNORMAL
SP GR UR STRIP: 1.01 (ref 1–1.03)
TRIGL SERPL-MCNC: 56 MG/DL
UROBILINOGEN UR STRIP-ACNC: 0.2 EU/DL (ref 0.2–1)
WBC #/AREA URNS AUTO: ABNORMAL /HPF

## 2019-11-05 PROCEDURE — 80061 LIPID PANEL: CPT | Performed by: INTERNAL MEDICINE

## 2019-11-05 PROCEDURE — 81001 URINALYSIS AUTO W/SCOPE: CPT | Performed by: INTERNAL MEDICINE

## 2019-11-05 PROCEDURE — G0103 PSA SCREENING: HCPCS | Performed by: INTERNAL MEDICINE

## 2019-11-05 PROCEDURE — 80053 COMPREHEN METABOLIC PANEL: CPT | Performed by: INTERNAL MEDICINE

## 2019-11-05 PROCEDURE — 99397 PER PM REEVAL EST PAT 65+ YR: CPT | Performed by: INTERNAL MEDICINE

## 2019-11-05 PROCEDURE — 36415 COLL VENOUS BLD VENIPUNCTURE: CPT | Performed by: INTERNAL MEDICINE

## 2019-11-05 RX ORDER — ATORVASTATIN CALCIUM 40 MG/1
TABLET, FILM COATED ORAL
Qty: 90 TABLET | Refills: 3 | Status: SHIPPED | OUTPATIENT
Start: 2019-11-05 | End: 2020-09-15

## 2019-11-05 ASSESSMENT — ANXIETY QUESTIONNAIRES
GAD7 TOTAL SCORE: 10
4. TROUBLE RELAXING: SEVERAL DAYS
GAD7 TOTAL SCORE: 10
3. WORRYING TOO MUCH ABOUT DIFFERENT THINGS: MORE THAN HALF THE DAYS
7. FEELING AFRAID AS IF SOMETHING AWFUL MIGHT HAPPEN: SEVERAL DAYS
7. FEELING AFRAID AS IF SOMETHING AWFUL MIGHT HAPPEN: SEVERAL DAYS
1. FEELING NERVOUS, ANXIOUS, OR ON EDGE: MORE THAN HALF THE DAYS
2. NOT BEING ABLE TO STOP OR CONTROL WORRYING: MORE THAN HALF THE DAYS
GAD7 TOTAL SCORE: 10
5. BEING SO RESTLESS THAT IT IS HARD TO SIT STILL: SEVERAL DAYS
6. BECOMING EASILY ANNOYED OR IRRITABLE: SEVERAL DAYS

## 2019-11-05 ASSESSMENT — PATIENT HEALTH QUESTIONNAIRE - PHQ9
SUM OF ALL RESPONSES TO PHQ QUESTIONS 1-9: 4
10. IF YOU CHECKED OFF ANY PROBLEMS, HOW DIFFICULT HAVE THESE PROBLEMS MADE IT FOR YOU TO DO YOUR WORK, TAKE CARE OF THINGS AT HOME, OR GET ALONG WITH OTHER PEOPLE: NOT DIFFICULT AT ALL
SUM OF ALL RESPONSES TO PHQ QUESTIONS 1-9: 4

## 2019-11-05 ASSESSMENT — MIFFLIN-ST. JEOR: SCORE: 1490.15

## 2019-11-05 ASSESSMENT — ACTIVITIES OF DAILY LIVING (ADL): CURRENT_FUNCTION: NO ASSISTANCE NEEDED

## 2019-11-05 NOTE — PROGRESS NOTES
"SUBJECTIVE:   Chester Palacios is a 74 year old male who presents for Preventive Visit.      Are you in the first 12 months of your Medicare coverage?  No    Healthy Habits:     In general, how would you rate your overall health?  Good    Frequency of exercise:  2-3 days/week    Duration of exercise:  Other    Do you usually eat at least 4 servings of fruit and vegetables a day, include whole grains    & fiber and avoid regularly eating high fat or \"junk\" foods?  Yes    Taking medications regularly:  Yes    Medication side effects:  None    Ability to successfully perform activities of daily living:  No assistance needed    Home Safety:  No safety concerns identified    Hearing Impairment:  Need to ask people to speak up or repeat themselves    In the past 6 months, have you been bothered by leaking of urine? Yes    In general, how would you rate your overall mental or emotional health?  Good      PHQ-2 Total Score: 0    Additional concerns today:  Yes    Do you feel safe in your environment? Yes    Have you ever done Advance Care Planning? (For example, a Health Directive, POLST, or a discussion with a medical provider or your loved ones about your wishes): Yes, advance care planning is on file.      Fall risk     No falls within last year  Cognitive Screening   1) Repeat 3 items (Leader, Season, Table)    2) Clock draw: NORMAL  3) 3 item recall: Recalls 3 objects  Results: 3 items recalled: COGNITIVE IMPAIRMENT LESS LIKELY    Mini-CogTM Copyright SUSHANT Medley. Licensed by the author for use in [facility]; reprinted with permission (shaun@.[facility]). All rights reserved.      Do you have sleep apnea, excessive snoring or daytime drowsiness?: no    Reviewed and updated as needed this visit by clinical staff  Tobacco  Allergies  Meds  Med Hx  Surg Hx  Fam Hx  Soc Hx        Reviewed and updated as needed this visit by Provider        Social History     Tobacco Use     Smoking status: Never Smoker     " "Smokeless tobacco: Never Used   Substance Use Topics     Alcohol use: Yes     Comment: occ.     If you drink alcohol do you typically have >3 drinks per day or >7 drinks per week? No    Alcohol Use 11/5/2019   Prescreen: >3 drinks/day or >7 drinks/week? No   Prescreen: >3 drinks/day or >7 drinks/week? -           Donates \"blood\" Q 2-3 months.                    Mainly donates platelets.             No C-V sx.             Will be seeing Cardiology again soon.            \"they wanted to see me\".                                  L shoulder still bothersome.         He is R handed.              Does exercises for his shoulders regularly.                     Hoping to move to a cond soon; very busy, and anxiety provoking.               Moving to [facility]; the [facility].                Saw Urology in 5/19.                                    Current providers sharing in care for this patient include:   Patient Care Team:  Dariel Jack MD as PCP - General (Internal Medicine)  Rosa Macias MD as MD (Urology)  Olena Lezama RN as Registered Nurse (Urology)  Dariel Jack MD as Referring Physician (Internal Medicine)  Dariel Jack MD as Assigned PCP    The following health maintenance items are reviewed in Epic and correct as of today:  Health Maintenance   Topic Date Due     ZOSTER IMMUNIZATION (2 of 3) 12/15/2009     AORTIC ANEURYSM SCREENING (SYSTEM ASSIGNED)  07/19/2010     INFLUENZA VACCINE (1) 09/01/2019     FALL RISK ASSESSMENT  10/10/2019     MEDICARE ANNUAL WELLNESS VISIT  10/10/2019     COLONOSCOPY  10/27/2019     ADVANCE CARE PLANNING  10/27/2020     DTAP/TDAP/TD IMMUNIZATION (2 - Td) 12/09/2020     LIPID  10/10/2023     HEPATITIS C SCREENING  Completed     PHQ-2  Completed     PNEUMOCOCCAL IMMUNIZATION 65+ LOW/MEDIUM RISK  Completed     IPV IMMUNIZATION  Aged Out     MENINGITIS IMMUNIZATION  Aged Out     Patient Active Problem List    Diagnosis Date Noted     Abnormal " echocardiogram 12/12/2016     Priority: High     Coronary artery calcification seen on computed tomography 12/12/2016     Priority: High     See Cardiology consult 1/17;          Calcium score = 64%;      cardiac MRI shows EF 60%, no scar; switched to atorvastatin.          Anxiety state 10/26/2012     Priority: High     Works with psychiatrist       Shoulder pain, left 03/15/2019     Priority: Medium     Achilles tendinosis of left lower extremity 04/16/2018     Priority: Medium     Achilles tendinosis 04/05/2018     Priority: Medium     Preventive antibiotic 03/27/2018     Priority: Medium     Right shoulder pain, unspecified chronicity 07/31/2017     Priority: Medium     Hyperlipidemia LDL goal <100 04/05/2017     Priority: Medium     LDL 92 with 40 mg atorva in 10/17       BPH (benign prostatic hyperplasia) 03/08/2017     Priority: Medium     Neutropenia, unspecified type (H) 11/08/2016     Priority: Medium     Total white count 3000, with 1.5 neutrophils in November 2016, and platelets 147,000; normal on f/u in 12/16       IFG (impaired fasting glucose) 11/08/2016     Priority: Medium     111 in November 2016;  90 in 10/17       Osteoarthritis, unspecified osteoarthritis type, unspecified site 11/07/2016     Priority: Medium     Lower urinary tract symptoms (LUTS) 11/05/2016     Priority: Medium     See urology consult 10/16         Arthritis of right hip 12/21/2015     Priority: Medium     Arthritis of both hands 12/21/2015     Priority: Medium     Nonspecific abnormal electrocardiogram (ECG) (EKG) 12/21/2015     Priority: Medium     Sinus shelton rate 45; NSSTT changes; echo ordered as a pre-op test.        Family history of hemochromatosis 10/25/2015     Priority: Medium     Brother; Chester had nml labs in 2010       Primary localized osteoarthrosis, pelvic region and thigh 10/26/2012     Priority: Medium     Osteoarthritis 10/26/2012     Priority: Medium     Problem list name updated by automated process.  Provider to review       Insomnia 10/26/2012     Priority: Medium     He stopped taking alprazolam in 2015       Hearing loss 10/26/2012     Priority: Medium     Problem list name updated by automated process. Provider to review       ACP (advance care planning) 10/27/2015     Priority: Low     Advance Care Planning 10/27/2015: Receipt of ACP document:  Received: Health Care Directive which was witnessed or notarized on 4/9/14.  Document not previously scanned.  Validation form completed and sent with document to be scanned.  Code Status needs to be updated to reflect choices in most recent ACP document. Notification sent to Dr. Dariel Jack for followup.  Confirmed/documented designated decision maker(s).  Added by Radha Joaquin             Preventive measure 09/26/2013     Priority: Low     APRIMA DATA BASE UNDER THE 9/26/13 NOTE  Colonoscopy 10/09;          10/19; neg except for diverticulosis   PSA 1.81 in 10/12; stable. 1.8 in 11/13, 2.2 in 10/14; 2.04 in 10/15; 1.31 in 11/16; 1.33 in 10/17; 1.1 in 10/18         Past Surgical History:   Procedure Laterality Date     CYSTOSCOPY       CYSTOSCOPY, TRANSURETHRAL RESECTION (TUR) PROSTATE, COMBINED N/A 3/8/2017    Procedure: COMBINED CYSTOSCOPY, TRANSURETHRAL RESECTION (TUR) PROSTATE;  Surgeon: Rosa Macias MD;  Location: [facility] OR     HERNIA REPAIR  1985    Inguinal     JOINT REPLACEMENT, HIP RT/LT Right 1/16    Joint Replacement Hip RT/LT     ORTHOPEDIC SURGERY      R elbow Orif     PROSTATE SURGERY  2007    TUNA for BPH     TONSILLECTOMY      age 5       BP Readings from Last 3 Encounters:   11/05/19 130/74   05/14/19 138/72   04/22/19 120/76    Wt Readings from Last 3 Encounters:   11/05/19 74.4 kg (164 lb)   05/14/19 73.9 kg (163 lb)   04/22/19 75.8 kg (167 lb)                  Current Outpatient Medications   Medication Sig Dispense Refill     ACETAMINOPHEN PO Take 650 mg by mouth every 4 hours as needed for pain       amoxicillin (AMOXIL) 500 MG  "tablet Take 2000 mg 60 min prior to dental work 4 tablet 11     aspirin 81 MG tablet Take 81 mg by mouth every morning        atorvastatin (LIPITOR) 40 MG tablet TAKE 1 TABLET (40 MG) BY MOUTH DAILY 90 tablet 3     busPIRone (BUSPAR) 10 MG tablet Take by mouth 3 times daily 20 mg every morning, 10 mg every afternoon, 20 mg HS       DOXEPIN HCL PO Take by mouth At Bedtime Patient states that he takes 1.25 mL HS (12.5mg/1.25mL).       ibuprofen (ADVIL/MOTRIN) 200 MG tablet Take 600 mg by mouth every 4 hours as needed for mild pain       diclofenac (VOLTAREN) 1 % GEL Place onto the skin as needed for moderate pain Patient uses prn on hands for playing AMEE. (Patient not taking: Reported on 5/14/2019) 100 g 3     Allergies   Allergen Reactions     Animal Dander      cats         Review of Systems  CONSTITUTIONAL: NEGATIVE for fever, chills, change in weight  INTEGUMENTARY/SKIN: NEGATIVE for worrisome rashes, moles or lesions  EYES: NEGATIVE for vision changes or irritation  ENT/MOUTH: NEGATIVE for ear, mouth and throat problems  RESP: NEGATIVE for significant cough or SOB  BREAST: NEGATIVE for masses, tenderness or discharge  CV: NEGATIVE for chest pain, palpitations or peripheral edema  GI: NEGATIVE for nausea, abdominal pain, heartburn, or change in bowel habits  : NEGATIVE for frequency, dysuria, or hematuria  NEURO: NEGATIVE for weakness, dizziness or paresthesias  ENDOCRINE: NEGATIVE for temperature intolerance, skin/hair changes  HEME: NEGATIVE for bleeding problems  PSYCHIATRIC: NEGATIVE for changes in mood or affect    OBJECTIVE:   /74 (BP Location: Right arm, Patient Position: Chair, Cuff Size: Adult Regular)   Pulse 54   Resp 20   Ht 1.778 m (5' 10\")   Wt 74.4 kg (164 lb)   SpO2 96%   BMI 23.53 kg/m   Estimated body mass index is 23.53 kg/m  as calculated from the following:    Height as of this encounter: 1.778 m (5' 10\").    Weight as of this encounter: 74.4 kg (164 lb).  Physical " Exam  GENERAL: healthy, alert and no distress  EYES: Eyes grossly normal to inspection, PERRL and conjunctivae and sclerae normal  HENT: ear canals and TM's normal, nose and mouth without ulcers or lesions  NECK: no adenopathy, no asymmetry, masses, or scars and thyroid normal to palpation  RESP: lungs clear to auscultation - no rales, rhonchi or wheezes  CV: regular rate and rhythm, normal S1 S2, no S3 or S4, no murmur, click or rub, no peripheral edema and peripheral pulses strong  ABDOMEN: soft, nontender, no hepatosplenomegaly, no masses and bowel sounds normal   (male): normal male genitalia without lesions or urethral discharge, no hernia  SKIN: no suspicious lesions or rashes  NEURO: Normal strength and tone, mentation intact and speech normal  PSYCH: mentation appears normal, affect normal/bright    Diagnostic Test Results:  pending    ASSESSMENT / PLAN:   Chester was seen today for physical.    Diagnoses and all orders for this visit:    Routine history and physical examination of adult    Hyperlipidemia LDL goal <100  -     Comprehensive metabolic panel (BMP + Alb, Alk Phos, ALT, AST, Total. Bili, TP)  -     Lipid Profile (Chol, Trig, HDL, LDL calc)  -     atorvastatin (LIPITOR) 40 MG tablet; TAKE 1 TABLET (40 MG) BY MOUTH DAILY    Screening for prostate cancer  -     PSA, screen    Bilateral hearing loss, unspecified hearing loss type    Lower urinary tract symptoms (LUTS)  -     UA with Microscopic reflex to Culture     Summary and implications:  We reviewed multiple issues.           We reviewed all of the issues on the diagnoses list.                      Overall stable.  Check labs and adjust medications as indicated.             Patient Instructions   I will let you know your lab results.                       Have a good, safe winter.                    Return in about 1 year (around 11/5/2020) for yearly wellness visit, labs will be needed.      COUNSELING:  Reviewed preventive health counseling,  "as reflected in patient instructions  Special attention given to:       Regular exercise       Healthy diet/nutrition    Estimated body mass index is 23.53 kg/m  as calculated from the following:    Height as of this encounter: 1.778 m (5' 10\").    Weight as of this encounter: 74.4 kg (164 lb).         reports that he has never smoked. He has never used smokeless tobacco.      Appropriate preventive services were discussed with this patient, including applicable screening as appropriate for cardiovascular disease, diabetes, osteopenia/osteoporosis, and glaucoma.  As appropriate for age/gender, discussed screening for colorectal cancer, prostate cancer, breast cancer, and cervical cancer. Checklist reviewing preventive services available has been given to the patient.    Reviewed patients plan of care and provided an AVS. The Basic Care Plan (routine screening as documented in Health Maintenance) for Chester meets the Care Plan requirement. This Care Plan has been established and reviewed with the Patient.    Counseling Resources:  ATP IV Guidelines  Pooled Cohorts Equation Calculator  Breast Cancer Risk Calculator  FRAX Risk Assessment  ICSI Preventive Guidelines  Dietary Guidelines for Americans, 2010  Lust have it!'s MyPlate  ASA Prophylaxis  Lung CA Screening    Dariel Jack MD  [facility]    Identified Health Risks:  Answers for HPI/ROS submitted by the patient on 11/5/2019   Annual Exam:  If you checked off any problems, how difficult have these problems made it for you to do your work, take care of things at home, or get along with other people?: Not difficult at all  PHQ9 TOTAL SCORE: 4  GLENNA 7 TOTAL SCORE: 10  Results for orders placed or performed in visit on 11/05/19   Comprehensive metabolic panel (BMP + Alb, Alk Phos, ALT, AST, Total. Bili, TP)     Status: None   Result Value Ref Range    Sodium 142 133 - 144 mmol/L    Potassium 3.7 3.4 - 5.3 mmol/L    Chloride 106 94 - 109 mmol/L "    Carbon Dioxide 28 20 - 32 mmol/L    Anion Gap 8 3 - 14 mmol/L    Glucose 88 70 - 99 mg/dL    Urea Nitrogen 11 7 - 30 mg/dL    Creatinine 0.90 0.66 - 1.25 mg/dL    GFR Estimate 84 >60 mL/min/[1.73_m2]    GFR Estimate If Black >90 >60 mL/min/[1.73_m2]    Calcium 9.1 8.5 - 10.1 mg/dL    Bilirubin Total 0.6 0.2 - 1.3 mg/dL    Albumin 4.0 3.4 - 5.0 g/dL    Protein Total 7.9 6.8 - 8.8 g/dL    Alkaline Phosphatase 116 40 - 150 U/L    ALT 29 0 - 70 U/L    AST 22 0 - 45 U/L   Lipid Profile (Chol, Trig, HDL, LDL calc)     Status: Abnormal   Result Value Ref Range    Cholesterol 172 <200 mg/dL    Triglycerides 56 <150 mg/dL    HDL Cholesterol 49 >39 mg/dL    LDL Cholesterol Calculated 112 (H) <100 mg/dL    Non HDL Cholesterol 123 <130 mg/dL   PSA, screen     Status: None   Result Value Ref Range    PSA 1.44 0 - 4 ug/L   UA with Microscopic reflex to Culture     Status: Abnormal   Result Value Ref Range    Color Urine Yellow     Appearance Urine Clear     Glucose Urine Negative NEG^Negative mg/dL    Bilirubin Urine Negative NEG^Negative    Ketones Urine Negative NEG^Negative mg/dL    Specific Gravity Urine 1.015 1.003 - 1.035    pH Urine 7.5 (H) 5.0 - 7.0 pH    Protein Albumin Urine Negative NEG^Negative mg/dL    Urobilinogen Urine 0.2 0.2 - 1.0 EU/dL    Nitrite Urine Negative NEG^Negative    Blood Urine Moderate (A) NEG^Negative    Leukocyte Esterase Urine Negative NEG^Negative    Source Midstream Urine     WBC Urine 0 - 5 OTO5^0 - 5 /HPF    RBC Urine O - 2 OTO2^O - 2 /HPF     My chart message sent.                            The LDL cholesterol is slightly higher this time. Have you missed any doses of atorvastatin?                       The urine shows no signs of infection.                0-2 red blood cells is considered within normal limits.                         Your other lab results are normal,including the liver,kidney,electrolytes, and the PSA level.

## 2019-11-05 NOTE — Clinical Note
Colonoscopy thru [facility]( [place]) 10/31/2019- Diverticulosis, otherwise negative. Repeat in 10 years.

## 2019-11-05 NOTE — PATIENT INSTRUCTIONS
I will let you know your lab results.                       Have a good, safe winter.

## 2019-11-06 ASSESSMENT — ANXIETY QUESTIONNAIRES: GAD7 TOTAL SCORE: 10

## 2019-11-06 ASSESSMENT — PATIENT HEALTH QUESTIONNAIRE - PHQ9: SUM OF ALL RESPONSES TO PHQ QUESTIONS 1-9: 4

## 2019-11-12 ENCOUNTER — THERAPY VISIT (OUTPATIENT)
Dept: PHYSICAL THERAPY | Facility: CLINIC | Age: 74
End: 2019-11-12
Payer: COMMERCIAL

## 2019-11-12 DIAGNOSIS — M25.512 CHRONIC LEFT SHOULDER PAIN: ICD-10-CM

## 2019-11-12 DIAGNOSIS — G89.29 CHRONIC LEFT SHOULDER PAIN: ICD-10-CM

## 2019-11-12 PROCEDURE — 97110 THERAPEUTIC EXERCISES: CPT | Mod: GP | Performed by: PHYSICAL THERAPIST

## 2019-11-12 PROCEDURE — 97112 NEUROMUSCULAR REEDUCATION: CPT | Mod: GP | Performed by: PHYSICAL THERAPIST

## 2019-11-12 PROCEDURE — 97140 MANUAL THERAPY 1/> REGIONS: CPT | Mod: GP | Performed by: PHYSICAL THERAPIST

## 2019-11-18 ENCOUNTER — MYC MEDICAL ADVICE (OUTPATIENT)
Dept: FAMILY MEDICINE | Facility: CLINIC | Age: 74
End: 2019-11-18

## 2019-12-03 ENCOUNTER — THERAPY VISIT (OUTPATIENT)
Dept: PHYSICAL THERAPY | Facility: CLINIC | Age: 74
End: 2019-12-03
Payer: COMMERCIAL

## 2019-12-03 DIAGNOSIS — G89.29 CHRONIC LEFT SHOULDER PAIN: ICD-10-CM

## 2019-12-03 DIAGNOSIS — M25.512 CHRONIC LEFT SHOULDER PAIN: ICD-10-CM

## 2019-12-03 PROCEDURE — 97110 THERAPEUTIC EXERCISES: CPT | Mod: GP | Performed by: PHYSICAL THERAPIST

## 2019-12-03 PROCEDURE — 97112 NEUROMUSCULAR REEDUCATION: CPT | Mod: GP | Performed by: PHYSICAL THERAPIST

## 2019-12-03 NOTE — PROGRESS NOTES
PROGRESS  REPORT    Progress reporting period is from 10-1-19 to 12-3-19.       SUBJECTIVE   Subjective: He swam and didn't feel pain afterwards, only doing breast stroke. Swam 10 laps. He is moving, had to move 20 empty cardboard boxes and took 5 at a time, had some pain with lifting. Has been doing lots of packing. Official move-in is December 16th. Needs to make one more pallet for move. Also had pain yesterday playing tennis. No pain last night with taking Ibuprofen. Pain is infrequent, but if it comes may last whole day, into evening. 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   Current pain level is 2/10 Current Pain level: 1/10.     Previous pain level was  2/10  .   Changes in function:  Yes (See Goal flowsheet attached for changes in current functional level)  Adverse reaction to treatment or activity: None    OBJECTIVE  Changes noted in objective findings:  The objective findings below are from DOS 12-3-19.  Objective: Slight stretch in left arm with full flexion ROM. Good ROM into abduction. Pain and tightness in posterior capsule with passive end-range flexion ROM and tightness with end range Internal rotation. RTC 4 weeks.     ASSESSMENT/PLAN  Updated problem list and treatment plan: Diagnosis 1:  L shoulder pain  Pain -  hot/cold therapy  Decreased ROM/flexibility - manual therapy and therapeutic exercise  Decreased joint mobility - manual therapy and therapeutic exercise  Decreased strength - therapeutic exercise and therapeutic activities  Impaired muscle performance - neuro re-education  Decreased function - therapeutic activities  STG/LTGs have been met or progress has been made towards goals:  Yes (See Goal flow sheet completed today.)  Assessment of Progress: The patient's condition is improving.  Self Management Plans:  Patient has been instructed in a home treatment program.  I have re-evaluated this patient and find that the nature, scope, duration and intensity of the  therapy is appropriate for the medical condition of the patient.  Chester continues to require the following intervention to meet STG and LTG's:  PT    Recommendations:  This patient would benefit from continued therapy.     Frequency:  2 X a month, once daily  Duration:  for 2 months        Please refer to the daily flowsheet for treatment today, total treatment time and time spent performing 1:1 timed codes.

## 2019-12-03 NOTE — LETTER
[facility]TH[facility]  [id number]  [facility] [address] [phone]    2019    Re: Chester Palacios   :   1945  MRN:  [id number]   REFERRING PHYSICIAN:   Armen Rosas    [facility]    Date of Initial Evaluation:  3-15-19  Visits:  Rxs Used: 21(Simultaneous filing. User may not have seen previous data.)  Reason for Referral:  Chronic left shoulder pain    PROGRESS  REPORT  Progress reporting period is from 10-1-19 to 12-3-19.       SUBJECTIVE   Subjective: He swam and didn't feel pain afterwards, only doing breast stroke. Swam 10 laps. He is moving, had to move 20 empty cardboard boxes and took 5 at a time, had some pain with lifting. Has been doing lots of packing. Official move-in is . Needs to make one more pallet for move. Also had pain yesterday playing tennis. No pain last night with taking Ibuprofen. Pain is infrequent, but if it comes may last whole day, into evening. 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   Current pain level is 2/10 Current Pain level: 1/10.     Previous pain level was  2/10.     Changes in function:  Yes Adverse reaction to treatment or activity: None    OBJECTIVE  Changes noted in objective findings:  The objective findings below are from DOS 12-3-19.  Objective: Slight stretch in left arm with full flexion ROM. Good ROM into abduction. Pain and tightness in posterior capsule with passive end-range flexion ROM and tightness with end range Internal rotation. RTC 4 weeks.     ASSESSMENT/PLAN  Updated problem list and treatment plan: Diagnosis 1:  L shoulder pain  Pain -  hot/cold therapy  Decreased ROM/flexibility - manual therapy and therapeutic exercise  Decreased joint mobility - manual therapy and therapeutic exercise  Decreased strength - therapeutic exercise and therapeutic activities  Impaired muscle performance -  neuro re-education  Decreased function - therapeutic activities  STG/LTGs have been met or progress has been made towards goals:  Yes (See Goal flow sheet completed today.)  Assessment of Progress: The patient's condition is improving.  Self Management Plans:  Patient has been instructed in a home treatment program.  I have re-evaluated this patient and find that the nature, scope, duration and intensity of the therapy is appropriate for the medical condition of the patient.  Chester continues to require the following intervention to meet STG and LTG's:  PT  Re: Chester Palacios   :   1945    Recommendations:  This patient would benefit from continued therapy.     Frequency:  2 X a month, once daily  Duration:  for 2 months    Thank you for your referral.    INQUIRIES  Therapist: Yessi David, PT   [facility]  [address] [place] [address] [phone]  [facility] [address]  Phone: [phone]  Fax: [phone]

## 2019-12-27 ENCOUNTER — THERAPY VISIT (OUTPATIENT)
Dept: PHYSICAL THERAPY | Facility: CLINIC | Age: 74
End: 2019-12-27
Payer: COMMERCIAL

## 2019-12-27 DIAGNOSIS — G89.29 CHRONIC LEFT SHOULDER PAIN: ICD-10-CM

## 2019-12-27 DIAGNOSIS — M25.512 CHRONIC LEFT SHOULDER PAIN: ICD-10-CM

## 2019-12-27 PROCEDURE — 97110 THERAPEUTIC EXERCISES: CPT | Mod: GP | Performed by: PHYSICAL THERAPIST

## 2019-12-27 PROCEDURE — 97112 NEUROMUSCULAR REEDUCATION: CPT | Mod: GP | Performed by: PHYSICAL THERAPIST

## 2020-01-14 ENCOUNTER — THERAPY VISIT (OUTPATIENT)
Dept: PHYSICAL THERAPY | Facility: CLINIC | Age: 75
End: 2020-01-14
Payer: COMMERCIAL

## 2020-01-14 DIAGNOSIS — M25.512 CHRONIC LEFT SHOULDER PAIN: ICD-10-CM

## 2020-01-14 DIAGNOSIS — G89.29 CHRONIC LEFT SHOULDER PAIN: ICD-10-CM

## 2020-01-14 PROCEDURE — 97110 THERAPEUTIC EXERCISES: CPT | Mod: GP | Performed by: PHYSICAL THERAPIST

## 2020-01-14 PROCEDURE — 97112 NEUROMUSCULAR REEDUCATION: CPT | Mod: GP | Performed by: PHYSICAL THERAPIST

## 2020-01-15 ENCOUNTER — TRANSFERRED RECORDS (OUTPATIENT)
Dept: HEALTH INFORMATION MANAGEMENT | Facility: CLINIC | Age: 75
End: 2020-01-15

## 2020-01-18 PROBLEM — E78.5 HYPERLIPIDEMIA LDL GOAL <100: Status: ACTIVE | Noted: 2017-04-05

## 2020-02-04 ENCOUNTER — THERAPY VISIT (OUTPATIENT)
Dept: PHYSICAL THERAPY | Facility: CLINIC | Age: 75
End: 2020-02-04
Payer: COMMERCIAL

## 2020-02-04 DIAGNOSIS — G89.29 CHRONIC LEFT SHOULDER PAIN: ICD-10-CM

## 2020-02-04 DIAGNOSIS — M25.512 CHRONIC LEFT SHOULDER PAIN: ICD-10-CM

## 2020-02-04 PROCEDURE — 97110 THERAPEUTIC EXERCISES: CPT | Mod: GP | Performed by: PHYSICAL THERAPIST

## 2020-02-04 PROCEDURE — 97112 NEUROMUSCULAR REEDUCATION: CPT | Mod: GP | Performed by: PHYSICAL THERAPIST

## 2020-02-04 NOTE — PROGRESS NOTES
PROGRESS  REPORT    Progress reporting period is from 12-3-19 to 2-4-20.       SUBJECTIVE    Subjective: Doing well did start the new exs of throwing the ball up 20x and did     Current Pain level: 0/10.     Previous pain level was  1/10  .   Changes in function:  Yes (See Goal flowsheet attached for changes in current functional level)  Adverse reaction to treatment or activity: None    OBJECTIVE  Changes noted in objective findings:  The objective findings below are from DOS 2-4-20.  Objective: Plans to play tennis tomorrow and will try throwing the ball up. Did pull a box off a high shelf 3 days ago and did have some pain in the shoulder for a few days. No pain today. Did swim 2x 10 laps breast stroke and did ok. AROM no pain. PROM slight decreased flexion no pain. He will cont with tennis and swimming. He reports he called MD and was told new orders will be sent over. RTC 4 weeks     ASSESSMENT/PLAN  Updated problem list and treatment plan: Diagnosis 1:  L shoulder pain  Decreased ROM/flexibility - manual therapy and therapeutic exercise  Decreased strength - therapeutic exercise and therapeutic activities  Impaired muscle performance - neuro re-education  Decreased function - therapeutic activities  STG/LTGs have been met or progress has been made towards goals:  Yes (See Goal flow sheet completed today.)  Assessment of Progress: The patient's condition is improving.  Self Management Plans:  Patient has been instructed in a home treatment program.  I have re-evaluated this patient and find that the nature, scope, duration and intensity of the therapy is appropriate for the medical condition of the patient.  Chester continues to require the following intervention to meet STG and LTG's:  PT    Recommendations:  This patient would benefit from continued therapy.     Frequency:  1 X a month, once daily  Duration:  for 2 months        Please refer to the daily flowsheet for treatment today, total treatment time and  time spent performing 1:1 timed codes.

## 2020-02-04 NOTE — LETTER
[facility] ATHLETIC [facility]  [address]  [facility] [id number] [phone]    2020    Re: Chester Palacios   :   1945  MRN:  [id number]   REFERRING PHYSICIAN:   Armen Rosas    [facility]    Date of Initial Evaluation: 3-15-19  Visits:  Rxs Used: 24  Reason for Referral:  Chronic left shoulder pain    PROGRESS  REPORT  Progress reporting period is from 12-3-19 to 20.       SUBJECTIVE  Subjective: Doing well did start the new exs of throwing the ball up 20x and did     Current Pain level: 0/10.     Previous pain level was  1/10.   Changes in function:  Yes (See Goal flowsheet attached for changes in current functional level)  Adverse reaction to treatment or activity: None    OBJECTIVE  Changes noted in objective findings:  The objective findings below are from DOS 20.  Objective: Plans to play tennis tomorrow and will try throwing the ball up. Did pull a box off a high shelf 3 days ago and did have some pain in the shoulder for a few days. No pain today. Did swim 2x 10 laps breast stroke and did ok. AROM no pain. PROM slight decreased flexion no pain. He will cont with tennis and swimming. He reports he called MD and was told new orders will be sent over. RTC 4 weeks     ASSESSMENT/PLAN  Updated problem list and treatment plan: Diagnosis 1:  L shoulder pain  Decreased ROM/flexibility - manual therapy and therapeutic exercise  Decreased strength - therapeutic exercise and therapeutic activities  Impaired muscle performance - neuro re-education  Decreased function - therapeutic activities  STG/LTGs have been met or progress has been made towards goals:  Yes (See Goal flow sheet completed today.)  Assessment of Progress: The patient's condition is improving.  Self Management Plans:  Patient has been instructed in a home treatment program.  I have re-evaluated this patient and find that  the nature, scope, duration and intensity of the therapy is appropriate for the medical condition of the patient.  Chester continues to require the following intervention to meet STG and LTG's:  PT          Re: Chester Palacios   :   1945    Recommendations:  This patient would benefit from continued therapy.     Frequency:  1 X a month, once daily  Duration:  for 2 months    Thank you for your referral.    INQUIRIES  Therapist: Yessi David, PT   [facility] PHYSICAL THERAPY  [address] [place] [address] [id number]  [facility] [address]  Phone: [phone]  Fax: [phone]

## 2020-03-03 ENCOUNTER — THERAPY VISIT (OUTPATIENT)
Dept: PHYSICAL THERAPY | Facility: CLINIC | Age: 75
End: 2020-03-03
Payer: COMMERCIAL

## 2020-03-03 DIAGNOSIS — M25.512 CHRONIC LEFT SHOULDER PAIN: ICD-10-CM

## 2020-03-03 DIAGNOSIS — G89.29 CHRONIC LEFT SHOULDER PAIN: ICD-10-CM

## 2020-03-03 PROCEDURE — 97110 THERAPEUTIC EXERCISES: CPT | Mod: GP | Performed by: PHYSICAL THERAPIST

## 2020-03-03 PROCEDURE — 97112 NEUROMUSCULAR REEDUCATION: CPT | Mod: GP | Performed by: PHYSICAL THERAPIST

## 2020-03-03 NOTE — LETTER
[facility] ATHLETIC [facility] PHYSICAL [facility]  [address]  [facility] [id number] [phone]    2020    Re: Chester Palacios   :   1945  MRN:  [id number]   REFERRING PHYSICIAN:   Armen Rosas    [facility] PH[facility]ICAL [facility]    Date of Initial Evaluation:  3-15-19  Visits:     Reason for Referral:  Chronic left shoulder pain    DISCHARGE REPORT  Progress reporting period is from 20 to 3-3-20.       SUBJECTIVE  Subjective: Doing well has swum 20 laps and did play some tennis with throwing the ball up.   Current Pain level: 0/10.     Previous pain level was  2/10  .   Changes in function:  Yes (See Goal flowsheet attached for changes in current functional level)  Adverse reaction to treatment or activity: None    OBJECTIVE  Changes noted in objective findings:  The objective findings below are from DOS 3-3-20.  Objective: Has been doing well with these so far. Not having pain. Wants to keep progerssing his activity. Thinking of working on his own. Thinking about how to continue with the exs. Plays tennis 2x a week. Wants to swim 1-2 x a week. When it's ski season 1-2x a week. His original shoulder injury occurred whe he was doing a back stroke and felt his shoulder hurt. Then he was taking the swim class that really irritated his shoulder. We discussed  coming back to PT before his pain gets too bad.  AROM PROM painfree. He will continue with his strengthening exs 3x aweek and stretching every day.      ASSESSMENT/PLAN  Updated problem list and treatment plan: Diagnosis 1:  L shoulder pain    STG/LTGs have been met or progress has been made towards goals:  Yes (See Goal flow sheet completed today.)  Assessment of Progress: The patient's condition is improving.  Self Management Plans:  Patient has been instructed in a home treatment program.    Chester continues to require the following intervention to meet STG and LTG's:  PT  intervention is no longer required to meet STG/LTG.          Re: Chester Palacios   :   1945    Recommendations:  This patient is ready to be discharged from therapy and continue their home treatment program.    Thank you for your referral.    INQUIRIES  Therapist: Yessi David PT   [facility] PHYSICAL THERAPY  [address] [place] [address] [address]  [facility] [address]  Phone: [phone]  Fax: [phone]

## 2020-03-03 NOTE — PROGRESS NOTES
DISCHARGE REPORT    Progress reporting period is from 2-4-20 to 3-3-20.       SUBJECTIVE    Subjective: Doing well has swum 20 laps and did play some tennis with throwing the ball up.    Current Pain level: 0/10.     Previous pain level was  2/10  .   Changes in function:  Yes (See Goal flowsheet attached for changes in current functional level)  Adverse reaction to treatment or activity: None    OBJECTIVE  Changes noted in objective findings:  The objective findings below are from DOS 3-3-20.  Objective: Has been doing well with these so far. Not having pain. Wants to keep progerssing his activity. Thinking of working on his own. Thinking about how to continue with the exs. Plays tennis 2x a week. Wants to swim 1-2 x a week. When it's ski season 1-2x a week. His original shoulder injury occurred whe he was doing a back stroke and felt his shoulder hurt. Then he was taking the swim class that really irritated his shoulder. We discussed  coming back to PT before his pain gets too bad.  AROM PROM painfree. He will continue with his strengthening exs 3x aweek and stretching every day.      ASSESSMENT/PLAN  Updated problem list and treatment plan: Diagnosis 1:  L shoulder pain    STG/LTGs have been met or progress has been made towards goals:  Yes (See Goal flow sheet completed today.)  Assessment of Progress: The patient's condition is improving.  Self Management Plans:  Patient has been instructed in a home treatment program.    Chester continues to require the following intervention to meet STG and LTG's:  PT intervention is no longer required to meet STG/LTG.    Recommendations:  This patient is ready to be discharged from therapy and continue their home treatment program.    Please refer to the daily flowsheet for treatment today, total treatment time and time spent performing 1:1 timed codes.

## 2020-03-23 PROBLEM — M67.88 ACHILLES TENDINOSIS OF LEFT LOWER EXTREMITY: Status: RESOLVED | Noted: 2018-04-16 | Resolved: 2020-03-23

## 2020-05-21 ENCOUNTER — OFFICE VISIT (OUTPATIENT)
Dept: FAMILY MEDICINE | Facility: CLINIC | Age: 75
End: 2020-05-21
Payer: COMMERCIAL

## 2020-05-21 VITALS
DIASTOLIC BLOOD PRESSURE: 60 MMHG | RESPIRATION RATE: 14 BRPM | OXYGEN SATURATION: 99 % | BODY MASS INDEX: 23.82 KG/M2 | WEIGHT: 166 LBS | HEART RATE: 57 BPM | SYSTOLIC BLOOD PRESSURE: 110 MMHG | TEMPERATURE: 98 F

## 2020-05-21 DIAGNOSIS — L84 CORN OR CALLUS: ICD-10-CM

## 2020-05-21 DIAGNOSIS — H91.93 BILATERAL HEARING LOSS, UNSPECIFIED HEARING LOSS TYPE: Primary | ICD-10-CM

## 2020-05-21 PROCEDURE — 99214 OFFICE O/P EST MOD 30 MIN: CPT | Performed by: FAMILY MEDICINE

## 2020-05-21 NOTE — PATIENT INSTRUCTIONS
Given the fact the patient is still having issues with hearing in his left ear with his hearing aid I suspect he has a dysfunctional hearing aid.  He will take his hearing aids to the audiologist for repair.    He is right fourth toe laterally had a large corn as noted above.  I pared this down to the base with a 15 blade scalpel.  Patient tolerated the procedure well.  He will use a toe separator to keep pressure off of the area.  He will return if symptoms continue.  I did recommend using a callus file gently starting in about a week to the area to keep the corn from recurring.

## 2020-05-21 NOTE — PROGRESS NOTES
Subjective     Chester Palacios is a 74 year old male who presents to clinic today for the following health issues:    HPI   Ear problem      Duration: n/a    Description (location/character/radiation): pt is having a fullness feeling in left ear, no pain, no drainage    Intensity:  mild, moderate    Accompanying signs and symptoms: no cold sxs, [place] even with hearing aids    History (similar episodes/previous evaluation): None    Precipitating or alleviating factors: None    Therapies tried and outcome: The patient does use hearing aids.  He did try wax remover drops in both ears.  That worked on the right but not on the left.       -corn on right foot, painful, tried liquid OTC    Corn      Duration: Many months    Description (location/character/radiation): Right fourth toe    Intensity:  moderate    Accompanying signs and symptoms: Discomfort    History (similar episodes/previous evaluation): None    Precipitating or alleviating factors: None    Therapies tried and outcome: Over-the-counter liquid corn medication that has not worked       Patient Active Problem List   Diagnosis     Primary localized osteoarthrosis, pelvic region and thigh     Osteoarthritis     Insomnia     Anxiety state     Hearing loss     Preventive measure     Family history of hemochromatosis     ACP (advance care planning)     Arthritis of right hip     Arthritis of both hands     Nonspecific abnormal electrocardiogram (ECG) (EKG)     Lower urinary tract symptoms (LUTS)     Osteoarthritis, unspecified osteoarthritis type, unspecified site     Neutropenia, unspecified type (H)     IFG (impaired fasting glucose)     Abnormal echocardiogram     Coronary artery calcification seen on computed tomography     BPH (benign prostatic hyperplasia)     Hyperlipidemia LDL goal <100     Right shoulder pain, unspecified chronicity     Preventive antibiotic     Achilles tendinosis     Screening for prostate cancer     Past Surgical History:   Procedure  Laterality Date     CYSTOSCOPY       CYSTOSCOPY, TRANSURETHRAL RESECTION (TUR) PROSTATE, COMBINED N/A 3/8/2017    Procedure: COMBINED CYSTOSCOPY, TRANSURETHRAL RESECTION (TUR) PROSTATE;  Surgeon: Rosa Macias MD;  Location: [facility] OR     HERNIA REPAIR      Inguinal     JOINT REPLACEMENT, HIP RT/LT Right     Joint Replacement Hip RT/LT     ORTHOPEDIC SURGERY      R elbow Orif     PROSTATE SURGERY      TUNA for BPH     TONSILLECTOMY      age 5       Social History     Tobacco Use     Smoking status: Never Smoker     Smokeless tobacco: Never Used   Substance Use Topics     Alcohol use: Yes     Comment: occ.     Family History   Problem Relation Age of Onset     Heart Disease Father          age 56; had chest pain,  in the hospital soon after; was a smoker     Genetic Disorder Brother         +HFE gene mutation, but does not have hemochromatosis, but has phlebotomies     Blood Disease Mother         hemolytic anemia;  age 85             Reviewed and updated as needed this visit by Provider         Review of Systems   Constitutional, HEENT, cardiovascular, pulmonary, gi and gu systems are negative, except as otherwise noted.      Objective    /60   Pulse 57   Temp 98  F (36.7  C) (Tympanic)   Resp 14   Wt 75.3 kg (166 lb)   SpO2 99%   BMI 23.82 kg/m    Body mass index is 23.82 kg/m .  Physical Exam   GENERAL APPEARANCE: healthy, alert and no distress  HENT: ear canals and TM's normal and minimal wax bilaterally.  SKIN: There is a 6 mm corn that is about 2 mm elevated from the base.  This is on the right fourth toe laterally.            Assessment & Plan       ICD-10-CM    1. Bilateral hearing loss, unspecified hearing loss type  H91.93    2. Corn or callus  L84           Patient Instructions   Given the fact the patient is still having issues with hearing in his left ear with his hearing aid I suspect he has a dysfunctional hearing aid.  He will take his hearing aids to the  audiologist for repair.    He is right fourth toe laterally had a large corn as noted above.  I pared this down to the base with a 15 blade scalpel.  Patient tolerated the procedure well.  He will use a toe separator to keep pressure off of the area.  He will return if symptoms continue.  I did recommend using a callus file gently starting in about a week to the area to keep the corn from recurring.      Return in about 4 weeks (around 6/18/2020) for If symptoms persist.    Herminio Menon MD  [facility]

## 2020-06-24 ENCOUNTER — NURSE TRIAGE (OUTPATIENT)
Dept: NURSING | Facility: CLINIC | Age: 75
End: 2020-06-24

## 2020-06-24 DIAGNOSIS — E78.5 HYPERLIPIDEMIA LDL GOAL <100: ICD-10-CM

## 2020-06-24 RX ORDER — ATORVASTATIN CALCIUM 80 MG/1
80 TABLET, FILM COATED ORAL DAILY
COMMUNITY
Start: 2019-11-13

## 2020-06-24 NOTE — TELEPHONE ENCOUNTER
Chester is calling regarding Atorvastatin and states that he should be taking 80mg daily and not 40mg.  Chester is requesting records be changed.  Chester was prescribed 80mg daily by a heart specialist.      Additional Information    Negative: Nursing judgment, per information in Reference    Negative: Information only call about a Well Adult (no illness or injury)    Negative: Nursing judgment or information in reference    Negative: Nursing judgment or information in reference    Negative: Nursing judgment or information in reference    Negative: Nursing judgment or information in reference    Negative: Nursing judgment or information in reference    Negative: Nursing judgment or information in reference    Negative: Nursing judgment or information in reference    Negative: Nursing judgment or information in reference    Negative: Nursing judgment or information in reference    Negative: Nursing judgment or information in reference    Negative: Nursing judgment or information in reference    Negative: Nursing judgment or information in reference    Negative: Nursing judgment or information in reference    Nursing judgment or information in reference    Protocols used: NO GUIDELINE [id number]

## 2020-08-26 DIAGNOSIS — Z79.2 PREVENTIVE ANTIBIOTIC: ICD-10-CM

## 2020-08-26 RX ORDER — AMOXICILLIN 500 MG/1
TABLET, FILM COATED ORAL
Qty: 4 TABLET | Refills: 11 | Status: SHIPPED | OUTPATIENT
Start: 2020-08-26 | End: 2021-09-22

## 2020-08-26 NOTE — TELEPHONE ENCOUNTER
Reason for Call:  Other prescription    Detailed comments: Patient needs his prescription for Amoxicillin filled for a dental visit.    Phone Number Patient can be reached at: Home number on file [phone] (home)    Best Time: Anytime    Can we leave a detailed message on this number? YES    Call taken on 8/26/2020 at 9:58 AM by Evan Walker

## 2020-08-26 NOTE — TELEPHONE ENCOUNTER
Routing refill request to provider for review/approval because:  Confirmation of dx required

## 2020-09-10 DIAGNOSIS — N40.1 BENIGN PROSTATIC HYPERPLASIA WITH NOCTURIA: Primary | ICD-10-CM

## 2020-09-10 DIAGNOSIS — R35.1 BENIGN PROSTATIC HYPERPLASIA WITH NOCTURIA: Primary | ICD-10-CM

## 2020-09-14 ENCOUNTER — PRE VISIT (OUTPATIENT)
Dept: UROLOGY | Facility: CLINIC | Age: 75
End: 2020-09-14

## 2020-09-14 ENCOUNTER — ALLIED HEALTH/NURSE VISIT (OUTPATIENT)
Dept: UROLOGY | Facility: CLINIC | Age: 75
End: 2020-09-14
Payer: COMMERCIAL

## 2020-09-14 DIAGNOSIS — R30.0 DYSURIA: Primary | ICD-10-CM

## 2020-09-14 DIAGNOSIS — R35.1 BENIGN PROSTATIC HYPERPLASIA WITH NOCTURIA: Primary | ICD-10-CM

## 2020-09-14 DIAGNOSIS — N40.1 BENIGN PROSTATIC HYPERPLASIA WITH NOCTURIA: Primary | ICD-10-CM

## 2020-09-14 DIAGNOSIS — R35.1 BENIGN PROSTATIC HYPERPLASIA WITH NOCTURIA: ICD-10-CM

## 2020-09-14 DIAGNOSIS — N40.1 BENIGN PROSTATIC HYPERPLASIA WITH NOCTURIA: ICD-10-CM

## 2020-09-14 LAB — PSA SERPL-MCNC: 1.44 UG/L (ref 0–4)

## 2020-09-14 NOTE — NURSING NOTE
Chester Oliver Palacios comes into clinic today at the request of Dr. Clint Leonardo Ordering Provider for PVR/Flow (uroflow).      Uroflow:    Volume - 266  Flow time - 30.8  Qmax - 15.0  Qmean - 8.7  TQmax - 7.8  Total time - 30.3      PVR:    29 mLs        This service provided today was under the supervising provider of the day Dr. Benito Magana, who was available if needed.    Queta Sotomayor, CMA

## 2020-09-15 ENCOUNTER — VIRTUAL VISIT (OUTPATIENT)
Dept: UROLOGY | Facility: CLINIC | Age: 75
End: 2020-09-15
Payer: COMMERCIAL

## 2020-09-15 DIAGNOSIS — N40.0 BENIGN PROSTATIC HYPERPLASIA WITHOUT LOWER URINARY TRACT SYMPTOMS: Primary | ICD-10-CM

## 2020-09-15 PROBLEM — Z96.642 HISTORY OF REVISION OF TOTAL REPLACEMENT OF LEFT HIP JOINT: Status: ACTIVE | Noted: 2018-01-15

## 2020-09-15 PROBLEM — E78.5 DYSLIPIDEMIA: Status: ACTIVE | Noted: 2017-01-06

## 2020-09-15 PROBLEM — I25.10 ASHD (ARTERIOSCLEROTIC HEART DISEASE): Status: ACTIVE | Noted: 2017-01-06

## 2020-09-15 RX ORDER — DOXEPIN HYDROCHLORIDE 10 MG/ML
10 SOLUTION ORAL
COMMUNITY
Start: 2020-06-10

## 2020-09-15 ASSESSMENT — PAIN SCALES - GENERAL: PAINLEVEL: NO PAIN (0)

## 2020-09-15 NOTE — LETTER
9/15/2020       RE: Chester Palacios  [address]  [address]  [facility] [address]     Dear Colleague,    Thank you for referring your patient, Chester Palacios, to the [facility] UROLOGY AND INST FOR PROSTATE AND UROLOGIC CANCERS at [facility]. Please see a copy of my visit note below.    Video Visit Technology for this patient: [employer] Video Visit- Patient was left in waiting room      Chester Palacios is a 75 year old male who is being evaluated via a billable video visit. Video only worked briefly at the beginning, there was an audible echo, remainder done on telephone.         UROLOGY VIDEO FOLLOW UP NOTE           Chief Complaint:   LUTS/BPH         Interval Update    Chester Palacios is a very pleasant 74 yo M previously followed by Dr. Macias.  Underwent TURP last year.  Had some initial bother but has been doing very well for the past several months.     Had UF/PVR yesterday  Volume - 266  Flow time - 30.8  Qmax - 15.0  Qmean - 8.7  TQmax - 7.8  Total time - 30.3     PVR: 29 mLs    He has been quite pleased with his symptoms.  He is not on any prostate medications at this time.  Denies hematuria, dysuria.  He does have very rare urge incontinence on long walks but this is rare, stable, not bothersome.      Physical Exam:   General Appearance: Well groomed, hygenic  Eyes: No redness, discharge  Respiratory: No cough, no respiratory distress or labored breathing  Musculoskeletal:  grossly normal, full range of motion in upper extremities, no gross deficits  Skin: No discoloration or apparent rashes  Neurologic - No tremors  Psychiatric - Alert and oriented  The rest of a comprehensive physical examination is deferred due to public health emergency video visit restrictions      Labs and Pathology:    I personally reviewed all applicable laboratory data and went over findings with patient  Significant for:    CBC RESULTS:  Recent Labs   Lab Test 10/10/18  1042  10/09/17  1346 03/14/17  0717 03/13/17  0735   WBC 5.1 3.7* 5.3 5.8   HGB 16.1 15.7 14.0 13.2*    192 119* 100*        BMP RESULTS:  Recent Labs   Lab Test 11/05/19  0851 10/10/18  1042 10/09/17  1346 03/14/17  0717    142 141 145*   POTASSIUM 3.7 4.1 4.3 3.9   CHLORIDE 106 108 106 111*   CO2 28 29 26 24   ANIONGAP 8 5 9 10   GLC 88 84 90 95   BUN 11 15 15 14   CR 0.90 0.97 0.91 0.87   GFRESTIMATED 84 76 82 86   GFRESTBLACK >90 >90 >90 >90  African American GFR Calc     MARIANO 9.1 9.2 9.3 8.9       UA RESULTS:   Recent Labs   Lab Test 11/05/19  0849 04/05/17  0911 03/12/17  1506   SG 1.015 1.010 1.017   URINEPH 7.5* 5.5 6.0   NITRITE Negative Negative Negative   RBCU O - 2 5-10* >182*   WBCU 0 - 5 5-10* >182*       PSA RESULTS  PSA   Date Value Ref Range Status   09/14/2020 1.44 0 - 4 ug/L Final     Comment:     Assay Method:  Chemiluminescence using Siemens Vista analyzer   11/05/2019 1.44 0 - 4 ug/L Final     Comment:     Assay Method:  Chemiluminescence using Siemens Vista analyzer   10/10/2018 1.10 0 - 4 ug/L Final     Comment:     Assay Method:  Chemiluminescence using Siemens Vista analyzer   10/09/2017 1.33 0 - 4 ug/L Final     Comment:     Assay Method:  Chemiluminescence using Siemens Vista analyzer   11/07/2016 1.31 0 - 4 ug/L Final   10/26/2015 2.04 0 - 4 ug/L Final   10/28/2014 2.20 0 - 4 ug/L Final   11/04/2013 1.80 0 - 4 ug/L Final   10/29/2012 1.81 0 - 4 ug/L Final            Assessment/Plan   75 year old male with LUTS/BPH s/p TURP 2017 doing well  -VOiding well, satisfied with symptoms  -Can f/u on a PRN basis  -Discussed that PSA testing is not necessarily indicated at this time, can consider stopping PSA checks or moving to biannual basis         Past Medical History:     Past Medical History:   Diagnosis Date     Anxiety state 10/26/2012    Works with psychiatrist      Arthritis of right hip 12/21/2015     Complication of anesthesia     pt states yes, but unaware of complicaiton      Coronary artery calcification seen on computed tomography 2016    See Cardiology consult ; cardiac MRI shows EF 60%, no scar       Family history of hemochromatosis 10/25/2015    Brother; Chester had nml labs in       Hyperlipidemia with target LDL less than 130 10/26/2012     Diagnosis updated by automated process. Provider to review and confirm.     Lower urinary tract symptoms (LUTS) 2016    See urology consult 10/16       Mumps     ~1955     Osteoarthritis, unspecified osteoarthritis type, unspecified site 2016            Past Surgical History:     Past Surgical History:   Procedure Laterality Date     CYSTOSCOPY       CYSTOSCOPY, TRANSURETHRAL RESECTION (TUR) PROSTATE, COMBINED N/A 3/8/2017    Procedure: COMBINED CYSTOSCOPY, TRANSURETHRAL RESECTION (TUR) PROSTATE;  Surgeon: Rosa Macias MD;  Location: [facility] OR     HERNIA REPAIR      Inguinal     JOINT REPLACEMENT, HIP RT/LT Right     Joint Replacement Hip RT/LT     ORTHOPEDIC SURGERY      R elbow Orif     PROSTATE SURGERY      TUNA for BPH     TONSILLECTOMY      age 5            Medications     Current Outpatient Medications   Medication     ACETAMINOPHEN PO     amoxicillin (AMOXIL) 500 MG tablet     aspirin 81 MG tablet     atorvastatin (LIPITOR) 80 MG tablet     busPIRone (BUSPAR) 10 MG tablet     diclofenac (VOLTAREN) 1 % GEL     doxepin (SINEQUAN) 10 MG/ML (HIGH CONC) solution     DOXEPIN HCL PO     ibuprofen (ADVIL/MOTRIN) 200 MG tablet     No current facility-administered medications for this visit.             Family History:     Family History   Problem Relation Age of Onset     Heart Disease Father          age 56; had chest pain,  in the hospital soon after; was a smoker     Genetic Disorder Brother         +HFE gene mutation, but does not have hemochromatosis, but has phlebotomies     Blood Disease Mother         hemolytic anemia;  age 85            Social History:     Social History  "    Socioeconomic History     Marital status:      Spouse name: Not on file     Number of children: Not on file     Years of education: Not on file     Highest education level: Not on file   Occupational History     Occupation:      Employer: SELF EMPLOYED.   Social Needs     Financial resource strain: Not on file     Food insecurity     Worry: Not on file     Inability: Not on file     Transportation needs     Medical: Not on file     Non-medical: Not on file   Tobacco Use     Smoking status: Never Smoker     Smokeless tobacco: Never Used   Substance and Sexual Activity     Alcohol use: Yes     Comment: occ.     Drug use: No     Sexual activity: Yes     Partners: Female   Lifestyle     Physical activity     Days per week: Not on file     Minutes per session: Not on file     Stress: Not on file   Relationships     Social connections     Talks on phone: Not on file     Gets together: Not on file     Attends Anabaptist service: Not on file     Active member of club or organization: Not on file     Attends meetings of clubs or organizations: Not on file     Relationship status: Not on file     Intimate partner violence     Fear of current or ex partner: Not on file     Emotionally abused: Not on file     Physically abused: Not on file     Forced sexual activity: Not on file   Other Topics Concern     Parent/sibling w/ CABG, MI or angioplasty before 65F 55M? Yes   Social History Narrative    Retired 2013            Allergies:   Animal dander         Review of Systems:  From intake questionnaire   Negative 14 system review except as noted on HPI, nurse's note.        CC:  Dariel Jack      The patient has been notified of following:     \"This video visit will be conducted via a call between you and your physician/provider. We have found that certain health care needs can be provided without the need for an in-person physical exam.  This service lets us provide the care you need with a video conversation.  " "If a prescription is necessary we can send it directly to your pharmacy.  If lab work is needed we can place an order for that and you can then stop by our lab to have the test done at a later time.    Video visits are billed at different rates depending on your insurance coverage.  Please reach out to your insurance provider with any questions.    If during the course of the call the physician/provider feels a video visit is not appropriate, you will not be charged for this service.\"    Patient has given verbal consent for Video visit? Yes  How would you like to obtain your AVS? MyChart  If you are dropped from the video visit, the video invite should be resent to: [employer]  Will anyone else be joining your video visit? No        Video-Visit Details    Type of service:  Video Visit/Telephone    10 minutes spent on phone    Originating Location (pt. Location): Home    Distant Location (provider location):  [facility] UROLOGY AND [facility] FOR PROSTATE AND UROLOGIC CANCERS     Platform used for Video Visit: Naina Leonardo MD        "

## 2020-09-15 NOTE — PROGRESS NOTES
Video Visit Technology for this patient: [employer] [place] Video Visit- Patient was left in waiting room      Chester Palacios is a 75 year old male who is being evaluated via a billable video visit. Video only worked briefly at the beginning, there was an audible echo, remainder done on telephone.         UROLOGY VIDEO FOLLOW UP NOTE           Chief Complaint:   LUTS/BPH         Interval Update    Chester Palacios is a very pleasant 74 yo M previously followed by Dr. Macias.  Underwent TURP last year.  Had some initial bother but has been doing very well for the past several months.     Had UF/PVR yesterday  Volume - 266  Flow time - 30.8  Qmax - 15.0  Qmean - 8.7  TQmax - 7.8  Total time - 30.3     PVR: 29 mLs    He has been quite pleased with his symptoms.  He is not on any prostate medications at this time.  Denies hematuria, dysuria.  He does have very rare urge incontinence on long walks but this is rare, stable, not bothersome.      Physical Exam:   General Appearance: Well groomed, hygenic  Eyes: No redness, discharge  Respiratory: No cough, no respiratory distress or labored breathing  Musculoskeletal:  grossly normal, full range of motion in upper extremities, no gross deficits  Skin: No discoloration or apparent rashes  Neurologic - No tremors  Psychiatric - Alert and oriented  The rest of a comprehensive physical examination is deferred due to public health emergency video visit restrictions      Labs and Pathology:    I personally reviewed all applicable laboratory data and went over findings with patient  Significant for:    CBC RESULTS:  Recent Labs   Lab Test 10/10/18  1042 10/09/17  1346 03/14/17  0717 03/13/17  0735   WBC 5.1 3.7* 5.3 5.8   HGB 16.1 15.7 14.0 13.2*    192 119* 100*        BMP RESULTS:  Recent Labs   Lab Test 11/05/19  0851 10/10/18  1042 10/09/17  1346 03/14/17  0717    142 141 145*   POTASSIUM 3.7 4.1 4.3 3.9   CHLORIDE 106 108 106 111*   CO2 28 29 26 24   ANIONGAP 8 5 9  10   GLC 88 84 90 95   BUN 11 15 15 14   CR 0.90 0.97 0.91 0.87   GFRESTIMATED 84 76 82 86   GFRESTBLACK >90 >90 >90 >90  African American GFR Calc     MARIANO 9.1 9.2 9.3 8.9       UA RESULTS:   Recent Labs   Lab Test 11/05/19  0849 04/05/17  0911 03/12/17  1506   SG 1.015 1.010 1.017   URINEPH 7.5* 5.5 6.0   NITRITE Negative Negative Negative   RBCU O - 2 5-10* >182*   WBCU 0 - 5 5-10* >182*       PSA RESULTS  PSA   Date Value Ref Range Status   09/14/2020 1.44 0 - 4 ug/L Final     Comment:     Assay Method:  Chemiluminescence using Siemens Vista analyzer   11/05/2019 1.44 0 - 4 ug/L Final     Comment:     Assay Method:  Chemiluminescence using Siemens Vista analyzer   10/10/2018 1.10 0 - 4 ug/L Final     Comment:     Assay Method:  Chemiluminescence using Siemens Vista analyzer   10/09/2017 1.33 0 - 4 ug/L Final     Comment:     Assay Method:  Chemiluminescence using Siemens Vista analyzer   11/07/2016 1.31 0 - 4 ug/L Final   10/26/2015 2.04 0 - 4 ug/L Final   10/28/2014 2.20 0 - 4 ug/L Final   11/04/2013 1.80 0 - 4 ug/L Final   10/29/2012 1.81 0 - 4 ug/L Final            Assessment/Plan   75 year old male with LUTS/BPH s/p TURP 2017 doing well  -VOiding well, satisfied with symptoms  -Can f/u on a PRN basis  -Discussed that PSA testing is not necessarily indicated at this time, can consider stopping PSA checks or moving to biannual basis         Past Medical History:     Past Medical History:   Diagnosis Date     Anxiety state 10/26/2012    Works with psychiatrist      Arthritis of right hip 12/21/2015     Complication of anesthesia     pt states yes, but unaware of complicaiton     Coronary artery calcification seen on computed tomography 12/12/2016    See Cardiology consult 1/17; cardiac MRI shows EF 60%, no scar       Family history of hemochromatosis 10/25/2015    Brother; Chester had nml labs in 2010      Hyperlipidemia with target LDL less than 130 10/26/2012     Diagnosis updated by automated process. Provider  to review and confirm.     Lower urinary tract symptoms (LUTS) 2016    See urology consult 10/16       Mumps     ~1955     Osteoarthritis, unspecified osteoarthritis type, unspecified site 2016            Past Surgical History:     Past Surgical History:   Procedure Laterality Date     CYSTOSCOPY       CYSTOSCOPY, TRANSURETHRAL RESECTION (TUR) PROSTATE, COMBINED N/A 3/8/2017    Procedure: COMBINED CYSTOSCOPY, TRANSURETHRAL RESECTION (TUR) PROSTATE;  Surgeon: Rosa Macias MD;  Location: [facility] OR     HERNIA REPAIR      Inguinal     JOINT REPLACEMENT, HIP RT/LT Right     Joint Replacement Hip RT/LT     ORTHOPEDIC SURGERY      R elbow Orif     PROSTATE SURGERY      TUNA for BPH     TONSILLECTOMY      age 5            Medications     Current Outpatient Medications   Medication     ACETAMINOPHEN PO     amoxicillin (AMOXIL) 500 MG tablet     aspirin 81 MG tablet     atorvastatin (LIPITOR) 80 MG tablet     busPIRone (BUSPAR) 10 MG tablet     diclofenac (VOLTAREN) 1 % GEL     doxepin (SINEQUAN) 10 MG/ML (HIGH CONC) solution     DOXEPIN HCL PO     ibuprofen (ADVIL/MOTRIN) 200 MG tablet     No current facility-administered medications for this visit.             Family History:     Family History   Problem Relation Age of Onset     Heart Disease Father          age 56; had chest pain,  in the hospital soon after; was a smoker     Genetic Disorder Brother         +HFE gene mutation, but does not have hemochromatosis, but has phlebotomies     Blood Disease Mother         hemolytic anemia;  age 85            Social History:     Social History     Socioeconomic History     Marital status:      Spouse name: Not on file     Number of children: Not on file     Years of education: Not on file     Highest education level: Not on file   Occupational History     Occupation:      Employer: SELF EMPLOYED.   Social Needs     Financial resource strain: Not on file     Food insecurity  "    Worry: Not on file     Inability: Not on file     Transportation needs     Medical: Not on file     Non-medical: Not on file   Tobacco Use     Smoking status: Never Smoker     Smokeless tobacco: Never Used   Substance and Sexual Activity     Alcohol use: Yes     Comment: occ.     Drug use: No     Sexual activity: Yes     Partners: Female   Lifestyle     Physical activity     Days per week: Not on file     Minutes per session: Not on file     Stress: Not on file   Relationships     Social connections     Talks on phone: Not on file     Gets together: Not on file     Attends [employer] service: Not on file     Active member of club or organization: Not on file     Attends meetings of clubs or organizations: Not on file     Relationship status: Not on file     Intimate partner violence     Fear of current or ex partner: Not on file     Emotionally abused: Not on file     Physically abused: Not on file     Forced sexual activity: Not on file   Other Topics Concern     Parent/sibling w/ CABG, MI or angioplasty before 65F 55M? Yes   Social History Narrative    Retired 2013            Allergies:   Animal dander         Review of Systems:  From intake questionnaire   Negative 14 system review except as noted on HPI, nurse's note.        CC:  Dariel Jack      The patient has been notified of following:     \"This video visit will be conducted via a call between you and your physician/provider. We have found that certain health care needs can be provided without the need for an in-person physical exam.  This service lets us provide the care you need with a video conversation.  If a prescription is necessary we can send it directly to your pharmacy.  If lab work is needed we can place an order for that and you can then stop by our lab to have the test done at a later time.    Video visits are billed at different rates depending on your insurance coverage.  Please reach out to your insurance provider with any " "questions.    If during the course of the call the physician/provider feels a video visit is not appropriate, you will not be charged for this service.\"    Patient has given verbal consent for Video visit? Yes  How would you like to obtain your AVS? MyChart  If you are dropped from the video visit, the video invite should be resent to: [employer]  Will anyone else be joining your video visit? No        Video-Visit Details    Type of service:  Video Visit/Telephone    10 minutes spent on phone    Originating Location (pt. Location): Home    Distant Location (provider location):  [facility] UROLOGY AND [facility] FOR PROSTATE AND UROLOGIC CANCERS     Platform used for Video Visit: Naina Leonardo MD        "

## 2020-09-15 NOTE — NURSING NOTE
Chief Complaint   Patient presents with     RECHECK     BPH/LUTS follow up     Queta Sotomayor, CMA

## 2020-09-22 NOTE — PATIENT INSTRUCTIONS
Follow up with Dr. Leonardo as needed.    It was a pleasure meeting with you today.  Thank you for allowing me and my team the privilege of caring for you today.  YOU are the reason we are here, and I truly hope we provided you with the excellent service you deserve.  Please let us know if there is anything else we can do for you so that we can be sure you are leaving completely satisfied with your care experience.        Queta Sotomayor, CMA

## 2020-11-16 ENCOUNTER — OFFICE VISIT (OUTPATIENT)
Dept: FAMILY MEDICINE | Facility: CLINIC | Age: 75
End: 2020-11-16
Payer: COMMERCIAL

## 2020-11-16 VITALS
HEIGHT: 70 IN | DIASTOLIC BLOOD PRESSURE: 80 MMHG | HEART RATE: 50 BPM | RESPIRATION RATE: 16 BRPM | WEIGHT: 161 LBS | SYSTOLIC BLOOD PRESSURE: 130 MMHG | OXYGEN SATURATION: 98 % | BODY MASS INDEX: 23.05 KG/M2 | TEMPERATURE: 97.6 F

## 2020-11-16 DIAGNOSIS — Z23 NEED FOR VACCINE FOR DT (DIPHTHERIA-TETANUS): ICD-10-CM

## 2020-11-16 DIAGNOSIS — M25.562 LEFT KNEE PAIN, UNSPECIFIED CHRONICITY: ICD-10-CM

## 2020-11-16 DIAGNOSIS — E78.5 HYPERLIPIDEMIA LDL GOAL <100: ICD-10-CM

## 2020-11-16 DIAGNOSIS — M54.81 OCCIPITAL NEURALGIA OF LEFT SIDE: ICD-10-CM

## 2020-11-16 DIAGNOSIS — Z00.00 ENCOUNTER FOR ANNUAL WELLNESS EXAM IN MEDICARE PATIENT: Primary | ICD-10-CM

## 2020-11-16 LAB
ALBUMIN SERPL-MCNC: 3.6 G/DL (ref 3.4–5)
ALP SERPL-CCNC: 89 U/L (ref 40–150)
ALT SERPL W P-5'-P-CCNC: 26 U/L (ref 0–70)
ANION GAP SERPL CALCULATED.3IONS-SCNC: <1 MMOL/L (ref 3–14)
AST SERPL W P-5'-P-CCNC: 21 U/L (ref 0–45)
BILIRUB SERPL-MCNC: 0.7 MG/DL (ref 0.2–1.3)
BUN SERPL-MCNC: 12 MG/DL (ref 7–30)
CALCIUM SERPL-MCNC: 8.9 MG/DL (ref 8.5–10.1)
CHLORIDE SERPL-SCNC: 110 MMOL/L (ref 94–109)
CHOLEST SERPL-MCNC: 131 MG/DL
CO2 SERPL-SCNC: 32 MMOL/L (ref 20–32)
CREAT SERPL-MCNC: 0.89 MG/DL (ref 0.66–1.25)
GFR SERPL CREATININE-BSD FRML MDRD: 84 ML/MIN/{1.73_M2}
GLUCOSE SERPL-MCNC: 83 MG/DL (ref 70–99)
HDLC SERPL-MCNC: 41 MG/DL
LDLC SERPL CALC-MCNC: 79 MG/DL
NONHDLC SERPL-MCNC: 90 MG/DL
POTASSIUM SERPL-SCNC: 3.9 MMOL/L (ref 3.4–5.3)
PROT SERPL-MCNC: 6.9 G/DL (ref 6.8–8.8)
SODIUM SERPL-SCNC: 142 MMOL/L (ref 133–144)
TRIGL SERPL-MCNC: 57 MG/DL

## 2020-11-16 PROCEDURE — 90471 IMMUNIZATION ADMIN: CPT | Performed by: INTERNAL MEDICINE

## 2020-11-16 PROCEDURE — 80061 LIPID PANEL: CPT | Performed by: INTERNAL MEDICINE

## 2020-11-16 PROCEDURE — 90714 TD VACC NO PRESV 7 YRS+ IM: CPT | Performed by: INTERNAL MEDICINE

## 2020-11-16 PROCEDURE — 99397 PER PM REEVAL EST PAT 65+ YR: CPT | Mod: 25 | Performed by: INTERNAL MEDICINE

## 2020-11-16 PROCEDURE — 36415 COLL VENOUS BLD VENIPUNCTURE: CPT | Performed by: INTERNAL MEDICINE

## 2020-11-16 PROCEDURE — 80053 COMPREHEN METABOLIC PANEL: CPT | Performed by: INTERNAL MEDICINE

## 2020-11-16 ASSESSMENT — MIFFLIN-ST. JEOR: SCORE: 1471.54

## 2020-11-16 ASSESSMENT — ACTIVITIES OF DAILY LIVING (ADL): CURRENT_FUNCTION: NO ASSISTANCE NEEDED

## 2020-11-16 NOTE — PATIENT INSTRUCTIONS
I will let you know your lab results.         You are planning some physical therapy for your left knee.                           Have a good, safe winter!                                 No falls or injuries.

## 2020-11-16 NOTE — PROGRESS NOTES
"SUBJECTIVE:   Chester Palacios is a 75 year old male who presents for Preventive Visit.      Patient has been advised of split billing requirements and indicates understanding: Yes   Are you in the first 12 months of your Medicare coverage?  No    Healthy Habits:    In general, how would you rate your overall health?  Good    Frequency of exercise:  6-7 days/week    Duration of exercise:  Greater than 60 minutes    Do you usually eat at least 4 servings of fruit and vegetables a day, include whole grains    & fiber and avoid regularly eating high fat or \"junk\" foods?  Yes    Taking medications regularly:  Yes    Barriers to taking medications:  None    Medication side effects:  None    Ability to successfully perform activities of daily living:  No assistance needed    Home Safety:  No safety concerns identified    Hearing Impairment:  No hearing concerns    In the past 6 months, have you been bothered by leaking of urine?  No    In general, how would you rate your overall mental or emotional health?  Fair      PHQ-2 Total Score:    Additional concerns today:  Yes    Do you feel safe in your environment? Yes    Have you ever done Advance Care Planning? (For example, a Health Directive, POLST, or a discussion with a medical provider or your loved ones about your wishes): Yes, advance care planning is on file.      Fall risk  Fallen 2 or more times in the past year?: No  Any fall with injury in the past year?: No    Cognitive Screening   1) Repeat 3 items (Leader, Season, Table)    2) Clock draw: NORMAL  3) 3 item recall: Recalls 2 objects   Results: NORMAL clock, 2 items recalled: COGNITIVE IMPAIRMENT LESS LIKELY    Mini-CogTM Copyright SUSHANT Medley. Licensed by the author for use in [facility]; reprinted with permission (shaun@.[facility]). All rights reserved.      Do you have sleep apnea, excessive snoring or daytime drowsiness?: no    Reviewed and updated as needed this visit by clinical staff  Tobacco  " Allergies  Meds   Med Hx  Surg Hx  Fam Hx  Soc Hx        Reviewed and updated as needed this visit by Provider                Social History     Tobacco Use     Smoking status: Never Smoker     Smokeless tobacco: Never Used   Substance Use Topics     Alcohol use: Yes     Comment: occ.     If you drink alcohol do you typically have >3 drinks per day or >7 drinks per week? No    Alcohol Use 11/16/2020   Prescreen: >3 drinks/day or >7 drinks/week? -   Prescreen: >3 drinks/day or >7 drinks/week? No           Left knee pain at night.              Infra and sub patellar.                                L occipital area pain, during a bowel movement/and afterwards.            Sometimes happens for no apparent reason.                   First noticed > 1 yr ago.                  Current providers sharing in care for this patient include:   Patient Care Team:  Dariel Jack MD as PCP - General (Internal Medicine)  Rosa Macias MD as MD (Urology)  Olena Lezama, RN as Registered Nurse (Urology)  Dariel Jack MD as Referring Physician (Internal Medicine)  Dariel Jack MD as Assigned PCP  Clint Leonardo MD as Assigned Surgical Provider    The following health maintenance items are reviewed in Epic and correct as of today:  Health Maintenance   Topic Date Due     AORTIC ANEURYSM SCREENING (SYSTEM ASSIGNED)  07/19/2010     FALL RISK ASSESSMENT  10/10/2019     MEDICARE ANNUAL WELLNESS VISIT  11/16/2021     LIPID  11/16/2025     ADVANCE CARE PLANNING  11/16/2025     COLORECTAL CANCER SCREENING  10/31/2029     DTAP/TDAP/TD IMMUNIZATION (3 - Td) 11/16/2030     HEPATITIS C SCREENING  Completed     PHQ-2  Completed     INFLUENZA VACCINE  Completed     Pneumococcal Vaccine: 65+ Years  Completed     ZOSTER IMMUNIZATION  Completed     Pneumococcal Vaccine: Pediatrics (0 to 5 Years) and At-Risk Patients (6 to 64 Years)  Aged Out     IPV IMMUNIZATION  Aged Out     MENINGITIS IMMUNIZATION  Aged Out      HEPATITIS B IMMUNIZATION  Aged Out     Patient Active Problem List    Diagnosis Date Noted     Abnormal echocardiogram 12/12/2016     Priority: High     Coronary artery calcification seen on computed tomography 12/12/2016     Priority: High     See Cardiology consult 1/17;          Calcium score = 64%;      cardiac MRI shows EF 60%, no scar; switched to atorvastatin.          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 01/15/2018     Priority: Medium     Right shoulder pain, unspecified chronicity 07/31/2017     Priority: Medium     Hyperlipidemia LDL goal <100 04/05/2017     Priority: Medium     LDL down to 83 with 80 mg atorva in 1/20  (per Cardiology)         BPH (benign prostatic hyperplasia) 03/08/2017     Priority: Medium     Dyslipidemia 01/06/2017     Priority: Medium     ASHD (arteriosclerotic heart disease) 01/06/2017     Priority: Medium     Neutropenia, unspecified type (H) 11/08/2016     Priority: Medium     Total white count 3000, with 1.5 neutrophils in November 2016, and platelets 147,000; normal on f/u in 12/16       IFG (impaired fasting glucose) 11/08/2016     Priority: Medium     111 in November 2016;  90 in 10/17       Osteoarthritis, unspecified osteoarthritis type, unspecified site 11/07/2016     Priority: Medium     Lower urinary tract symptoms (LUTS) 11/05/2016     Priority: Medium     See urology consult 10/16         Bradycardia 01/04/2016     Priority: Medium     Overview:   Echocardiogram : Normal LV size, probable hypokinesis of mid apical lateral and inferolateral walls, ejection fraction 50-55%       Insomnia,  idiopathic 01/02/2016     Priority: Medium     Overview:   History of taking alprozolam in 2015       Arthritis of right hip 12/21/2015     Priority: Medium     Arthritis of both hands 12/21/2015     Priority: Medium     Nonspecific abnormal electrocardiogram (ECG) (EKG) 12/21/2015     Priority: Medium     Sinus shelton rate 45; NSSTT changes; echo ordered as a pre-op test.        Family history of hemochromatosis 10/25/2015     Priority: Medium     BrotherTruong Briggs had nml labs in 2010    Overview:   Overview:   Brothgood Briggs had nml labs in 2010       Primary localized osteoarthrosis, pelvic region and thigh 10/26/2012     Priority: Medium     Osteoarthritis 10/26/2012     Priority: Medium     Problem list name updated by automated process. Provider to review       Insomnia 10/26/2012     Priority: Medium     He stopped taking alprazolam in 2015       Hearing loss 10/26/2012     Priority: Medium     Problem list name updated by automated process. Provider to review       OA (osteoarthritis) of hip 08/11/2008     Priority: Medium     Overview:   R total hip arthroplasty  with Dr. Morales  1/7/16       Hypertrophy of prostate with urinary obstruction 01/05/2007     Priority: Medium     Overview:   LW Modifier:  S/P TUNA -- 3/07  ; BPH w Obstruction       Hyperlipidemia 11/21/2003     Priority: Medium     Overview:   LW Onset:  21Nov03       ACP (advance care planning) 10/27/2015     Priority: Low     Advance Care Planning 10/27/2015: Receipt of ACP document:  Received: Health Care Directive which was witnessed or notarized on 4/9/14.  Document not previously scanned.  Validation form completed and sent with document to be scanned.  Code Status needs to be updated to reflect choices in most recent ACP document. Notification sent to Dr. Dariel Jack for followup.  Confirmed/documented designated decision maker(s).  Added by Radha Joaquin             Preventive measure 09/26/2013     Priority: Low     APRIMA DATA BASE  UNDER THE 9/26/13 NOTE  Colonoscopy 10/09;          10/19; neg except for diverticulosis   PSA 1.81 in 10/12; stable. 1.8 in 11/13, 2.2 in 10/14; 2.04 in 10/15; 1.31 in 11/16; 1.33 in 10/17; 1.1 in 10/18        1.44 in 9/20         Past Surgical History:   Procedure Laterality Date     CYSTOSCOPY       CYSTOSCOPY, TRANSURETHRAL RESECTION (TUR) PROSTATE, COMBINED N/A 3/8/2017    Procedure: COMBINED CYSTOSCOPY, TRANSURETHRAL RESECTION (TUR) PROSTATE;  Surgeon: Rosa Macias MD;  Location: [facility] OR     HERNIA REPAIR  1985    Inguinal     JOINT REPLACEMENT, HIP RT/LT Right 1/16    Joint Replacement Hip RT/LT     ORTHOPEDIC SURGERY      R elbow Orif     PROSTATE SURGERY  2007    TUNA for BPH     TONSILLECTOMY      age 5     Social History     Socioeconomic History     Marital status:      Spouse name: Not on file     Number of children: Not on file     Years of education: Not on file     Highest education level: Not on file   Occupational History     Occupation:      Employer: SELF EMPLOYED.   Social Needs     Financial resource strain: Not on file     Food insecurity     Worry: Not on file     Inability: Not on file     Transportation needs     Medical: Not on file     Non-medical: Not on file   Tobacco Use     Smoking status: Never Smoker     Smokeless tobacco: Never Used   Substance and Sexual Activity     Alcohol use: Yes     Comment: occ.     Drug use: No     Sexual activity: Yes     Partners: Female   Lifestyle     Physical activity     Days per week: Not on file     Minutes per session: Not on file     Stress: Not on file   Relationships     Social connections     Talks on phone: Not on file     Gets together: Not on file     Attends Christian service: Not on file     Active member of club or organization: Not on file     Attends meetings of clubs or organizations: Not on file     Relationship status: Not on file     Intimate partner violence     Fear of current or ex partner: Not on file      Emotionally abused: Not on file     Physically abused: Not on file     Forced sexual activity: Not on file   Other Topics Concern     Parent/sibling w/ CABG, MI or angioplasty before 65F 55M? Yes   Social History Narrative    Retired 2013     Immunization History   Administered Date(s) Administered     FLU 6-35 months 12/20/2005, 11/22/2006, 11/06/2007     Flu, Unspecified 11/05/2003, 12/20/2005, 11/22/2006, 11/06/2007     HEPA 03/05/2010, 10/29/2010     HepA-Adult 03/05/2010     HepB 10/29/2010     Influenza (H1N1) 01/18/2010     Influenza (High Dose) 3 valent vaccine 11/04/2013, 10/27/2014, 10/26/2015, 10/09/2017, 10/10/2018, 09/15/2020     Influenza (IIV3) PF 10/10/2011, 10/29/2012     Influenza Vaccine IM > 6 months Valent IIV4 10/26/2015, 11/07/2016     Pneumo Conj 13-V (2010&after) 10/26/2015     Pneumococcal 23 valent 04/29/2011     TD (ADULT, 7+) 12/09/2010, 11/16/2020     Td (Adult), Adsorbed 02/19/2001     Zoster vaccine recombinant adjuvanted (SHINGRIX) 10/15/2019, 12/27/2019     Zoster vaccine, live 10/20/2009       BP Readings from Last 3 Encounters:   11/16/20 130/80   05/21/20 110/60   11/05/19 130/74    Wt Readings from Last 3 Encounters:   11/16/20 73 kg (161 lb)   05/21/20 75.3 kg (166 lb)   11/05/19 74.4 kg (164 lb)                  Current Outpatient Medications   Medication Sig Dispense Refill     ACETAMINOPHEN PO Take 650 mg by mouth every 4 hours as needed for pain       amoxicillin (AMOXIL) 500 MG tablet Take 2000 mg 60 min prior to dental work 4 tablet 11     aspirin 81 MG tablet Take 81 mg by mouth every morning        atorvastatin (LIPITOR) 80 MG tablet Take 80 mg by mouth daily       busPIRone (BUSPAR) 10 MG tablet Take by mouth 3 times daily 20 mg every morning, 10 mg every afternoon, 20 mg HS       diclofenac (VOLTAREN) 1 % GEL Place onto the skin as needed for moderate pain Patient uses prn on hands for playing guitar. 100 g 3     doxepin (SINEQUAN) 10 MG/ML (HIGH CONC) solution TAKE  "1.5 2 ML BY MOUTH AT BEDTIME AS NEEDED FOR SLEEP. REFILL WHEN NEXT DUE           Review of Systems  CONSTITUTIONAL: NEGATIVE for fever, chills, change in weight  INTEGUMENTARY/SKIN: NEGATIVE for worrisome rashes, moles or lesions  EYES: NEGATIVE for vision changes or irritation  ENT/MOUTH: NEGATIVE for ear, mouth and throat problems  RESP: NEGATIVE for significant cough or SOB  BREAST: NEGATIVE for masses, tenderness or discharge  CV: NEGATIVE for chest pain, palpitations or peripheral edema  GI: NEGATIVE for nausea, abdominal pain, heartburn, or change in bowel habits  NEURO: NEGATIVE for weakness, dizziness or paresthesias  ENDOCRINE: NEGATIVE for temperature intolerance, skin/hair changes  HEME: NEGATIVE for bleeding problems  PSYCHIATRIC: NEGATIVE for changes in mood or affect    OBJECTIVE:   /80   Pulse 50   Temp 97.6  F (36.4  C)   Resp 16   Ht 1.778 m (5' 10\")   Wt 73 kg (161 lb)   SpO2 98%   BMI 23.10 kg/m   Estimated body mass index is 23.1 kg/m  as calculated from the following:    Height as of this encounter: 1.778 m (5' 10\").    Weight as of this encounter: 73 kg (161 lb).  Physical Exam  GENERAL: alert and no distress  EYES: Eyes grossly normal to inspection, PERRL and conjunctivae and sclerae normal  HENT: normal cephalic/atraumatic and ear canals and TM's normal  NECK: no adenopathy, no asymmetry, masses, or scars and thyroid normal to palpation  RESP: lungs clear to auscultation - no rales, rhonchi or wheezes  CV: regular rate and rhythm, normal S1 S2, no S3 or S4, no murmur, click or rub, no peripheral edema and peripheral pulses strong  ABDOMEN: soft, nontender, without hepatosplenomegaly or masses  MS: normal range of motion left knee  SKIN: no suspicious lesions or rashes  NEURO: Normal strength and tone, mentation intact and speech normal  PSYCH: mentation appears normal, affect normal/bright  LYMPH: no cervical adenopathy    Diagnostic Test Results:  pending    ASSESSMENT / PLAN: " "  Chester was seen today for physical and knee pain.    Diagnoses and all orders for this visit:    Encounter for annual wellness exam in Medicare patient    Hyperlipidemia LDL goal <100  -     Comprehensive metabolic panel (BMP + Alb, Alk Phos, ALT, AST, Total. Bili, TP)  -     Lipid Profile (Chol, Trig, HDL, LDL calc)    Left knee pain, unspecified chronicity  -     [facility] REFERRAL; Future    Occipital neuralgia of left side    Need for vaccine for DT (diphtheria-tetanus)  -     TD PRESERV FREE, IM (7+ YRS)                          Summary and implications:  We reviewed multiple issues.           We reviewed all of the issues on the diagnoses list.                       Check labs.             Likely has patellofemoral problem L knee.                 Patient Instructions   I will let you know your lab results.         You are planning some physical therapy for your left knee.                           Have a good, safe winter!                                 No falls or injuries.             Return in about 1 year (around 11/16/2021) for yearly wellness visit, labs will be needed.    Patient has been advised of split billing requirements and indicates understanding: Yes  COUNSELING:  Reviewed preventive health counseling, as reflected in patient instructions    Estimated body mass index is 23.1 kg/m  as calculated from the following:    Height as of this encounter: 1.778 m (5' 10\").    Weight as of this encounter: 73 kg (161 lb).        He reports that he has never smoked. He has never used smokeless tobacco.      Appropriate preventive services were discussed with this patient, including applicable screening as appropriate for cardiovascular disease, diabetes, osteopenia/osteoporosis, and glaucoma.  As appropriate for age/gender, discussed screening for colorectal cancer, prostate cancer, breast cancer, and cervical cancer. Checklist reviewing preventive services available has been given to " the patient.    Reviewed patients plan of care and provided an AVS. The Basic Care Plan (routine screening as documented in Health Maintenance) for Chester meets the Care Plan requirement. This Care Plan has been established and reviewed with the Patient.    Counseling Resources:  ATP IV Guidelines  Pooled Cohorts Equation Calculator  Breast Cancer Risk Calculator  Breast Cancer: Medication to Reduce Risk  FRAX Risk Assessment  ICSI Preventive Guidelines  Dietary Guidelines for Americans, 2010  USDA's MyPlate  ASA Prophylaxis  Lung CA Screening    Dariel Jack MD  [facility]    Results for orders placed or performed in visit on 11/16/20   Comprehensive metabolic panel (BMP + Alb, Alk Phos, ALT, AST, Total. Bili, TP)     Status: Abnormal   Result Value Ref Range    Sodium 142 133 - 144 mmol/L    Potassium 3.9 3.4 - 5.3 mmol/L    Chloride 110 (H) 94 - 109 mmol/L    Carbon Dioxide 32 20 - 32 mmol/L    Anion Gap <1 (L) 3 - 14 mmol/L    Glucose 83 70 - 99 mg/dL    Urea Nitrogen 12 7 - 30 mg/dL    Creatinine 0.89 0.66 - 1.25 mg/dL    GFR Estimate 84 >60 mL/min/[1.73_m2]    GFR Estimate If Black >90 >60 mL/min/[1.73_m2]    Calcium 8.9 8.5 - 10.1 mg/dL    Bilirubin Total 0.7 0.2 - 1.3 mg/dL    Albumin 3.6 3.4 - 5.0 g/dL    Protein Total 6.9 6.8 - 8.8 g/dL    Alkaline Phosphatase 89 40 - 150 U/L    ALT 26 0 - 70 U/L    AST 21 0 - 45 U/L   Lipid Profile (Chol, Trig, HDL, LDL calc)     Status: None   Result Value Ref Range    Cholesterol 131 <200 mg/dL    Triglycerides 57 <150 mg/dL    HDL Cholesterol 41 >39 mg/dL    LDL Cholesterol Calculated 79 <100 mg/dL    Non HDL Cholesterol 90 <130 mg/dL              My chart message sent.            The cholesterol levels are good.            Keep taking the same dose of atorvastatin.               Your other lab results are normal,including the liver,kidney,potassium,and the glucose level.                The low anion gap is erroneous; there is a  problem with the lab analyzer.

## 2020-11-18 ENCOUNTER — THERAPY VISIT (OUTPATIENT)
Dept: PHYSICAL THERAPY | Facility: CLINIC | Age: 75
End: 2020-11-18
Payer: COMMERCIAL

## 2020-11-18 DIAGNOSIS — M25.562 LEFT KNEE PAIN, UNSPECIFIED CHRONICITY: ICD-10-CM

## 2020-11-18 DIAGNOSIS — M25.562 ACUTE PAIN OF LEFT KNEE: ICD-10-CM

## 2020-11-18 PROCEDURE — 97161 PT EVAL LOW COMPLEX 20 MIN: CPT | Mod: GP | Performed by: PHYSICAL THERAPIST

## 2020-11-18 PROCEDURE — 97110 THERAPEUTIC EXERCISES: CPT | Mod: GP | Performed by: PHYSICAL THERAPIST

## 2020-11-18 ASSESSMENT — ACTIVITIES OF DAILY LIVING (ADL)
GO UP STAIRS: NOT ANSWERED
RISE FROM A CHAIR: NOT ANSWERED
STIFFNESS: I DO NOT HAVE THE SYMPTOM
PAIN: THE SYMPTOM AFFECTS MY ACTIVITY MODERATELY
SWELLING: I DO NOT HAVE THE SYMPTOM
KNEE_ACTIVITY_OF_DAILY_LIVING_SUM: 26
GIVING WAY, BUCKLING OR SHIFTING OF KNEE: I DO NOT HAVE THE SYMPTOM
WALK: NOT ANSWERED
LIMPING: I DO NOT HAVE THE SYMPTOM
SQUAT: NOT ANSWERED
SIT WITH YOUR KNEE BENT: NOT ANSWERED
WEAKNESS: I DO NOT HAVE THE SYMPTOM
KNEEL ON THE FRONT OF YOUR KNEE: NOT ANSWERED
GO DOWN STAIRS: NOT ANSWERED
STAND: NOT ANSWERED

## 2020-11-18 NOTE — PROGRESS NOTES
[facility] Initial Evaluation     Present: no    Subjective:  Chester Palacios is a 75 year old male with a left knee condition. Pt reports that he started getting pain in the left knee 1-2 months ago in the evening which he relates to walking and home exercises he performs. Denies vague symptoms or prior history. sking(skate ski) and walks 2-3 miles a day. Would like to avoid surgery and get back to PLOF. HOLLY on the left.      Symptoms commenced as a result of: walking/overuse. Condition occurred in the following environment: overtime. Onset of symptoms: 1-2 months ago. Location of symptoms: anterior quad, inferior patellar region . Pain level on number scale: 2/10. Quality of pain: aching, shooting. Associated symptoms: none. Pain frequency (constant/intermittent): comes and goes. Symptoms are exacerbated by: walking, certain exercises, skiing at times. Symptoms are relieved by: rest, avoidance. Progression of symptoms since onset (same/better/worse): same. Special tests (x-ray, MRI, CT scan, EMG, bone scan): none. Previous treatment: none. Improvement with previous treatment: none. General health as reported by patient is good. Pertinent medical history includes: See Epic. Medical allergies: see Epic. Other pertinent surgeries: see Epic. Current medications: See Epic. Occupation: retired. Patient is (working in normal job without restrictions/working in normal job with restrictions/working in an alternate job/not working due to present treatment problem): normal. Primary job tasks: home tasks, walking. Barriers at home/work: None reported by patient. Red flags: None reported by patient.    Objective  Gait:  Forward gait, Wide ROBE  Screening: negative hip    Flexibility: gastroc tight left    Posture Right Left   Genu Valgum     Genu Varum     Genu Recurvatum     Patella [place]     Patella [place]       Knee AROM/PROM: R 0-0-140, L 0-0-140*    Hip ROM: WFL anna(known HOLLY on the  left)    Knee Strength (* = pain) Right Left   FL 4/5 4/5   EXT 5/5 5-/5   Quad Contraction (Good/Fair/Poor) good fair   Hip ABD 4/5 4-/5     Special Tests Right Left   Lachman     Anterior Drawer     Posterior Drawer     Valgus Stress - Medial Instability     Varus Stress - Lateral Instability     Marjorie (Lateral Meniscus)     Marjorie (Medial Meniscus)     Adolph's     Patellar Grind Test       Palpation Tenderness:  Slight medial joint line    Knee Joint Circumference Right Left   Joint Line         Accessory Motion: hypomobile superior/inferior patellar glide with grind pain free on the left, otherwise unremarkable    Functional Squat: fair squat pain free    Balance: good SLS anna    Other tests: none    Assessment/Plan:    Patient is a 75 year old male with left side knee complaints.    Patient has the following significant findings with corresponding treatment plan.                Diagnosis 1:  Left Knee pain   Pain -  manual therapy, self management, education and home program  Decreased ROM/flexibility - manual therapy and therapeutic exercise  Decreased joint mobility - manual therapy and therapeutic exercise  Decreased strength - therapeutic exercise and therapeutic activities  Impaired gait - gait training  Impaired muscle performance - neuro re-education  Decreased function - therapeutic activities    Therapy Evaluation Codes:   1) History comprised of:   Personal factors that impact the plan of care:      Age and Overall behavior pattern.    Comorbidity factors that impact the plan of care are:      Depression and Pain at night/rest.     Medications impacting care: None.  2) Examination of Body Systems comprised of:   Body structures and functions that impact the plan of care:      Knee.   Activity limitations that impact the plan of care are:      Lifting, Running, Sitting, Sports, Squatting/kneeling, Stairs, Standing, Walking and Laying down.  3) Clinical presentation characteristics  are:   Stable/Uncomplicated.  4) Decision-Making    Low complexity using standardized patient assessment instrument and/or measureable assessment of functional outcome.  Cumulative Therapy Evaluation is: Low complexity.    Previous and current functional limitations:  (See Goal Flow Sheet for this information)    Short term and Long term goals: (See Goal Flow Sheet for this information)     Communication ability:  Patient appears to be able to clearly communicate and understand verbal and written communication and follow directions correctly.  Treatment Explanation - The following has been discussed with the patient:   RX ordered/plan of care  Anticipated outcomes  Possible risks and side effects  This patient would benefit from PT intervention to resume normal activities.   Rehab potential is good.    Frequency:  1 X week, once daily  Duration:  for 8 weeks  Discharge Plan:  Achieve all LTG.  Independent in home treatment program.  Reach maximal therapeutic benefit.    Please refer to the daily flowsheet for treatment today, total treatment time and time spent performing 1:1 timed codes.   Please Contact me with any questions or concerns. Thank you for for patience and cooperation.     Delta Ahn PT, DPT, CSCS  Physical Therapist  [facility] [place]  [phone]

## 2020-11-24 ENCOUNTER — THERAPY VISIT (OUTPATIENT)
Dept: PHYSICAL THERAPY | Facility: CLINIC | Age: 75
End: 2020-11-24
Payer: COMMERCIAL

## 2020-11-24 DIAGNOSIS — M25.562 ACUTE PAIN OF LEFT KNEE: ICD-10-CM

## 2020-11-24 PROCEDURE — 97112 NEUROMUSCULAR REEDUCATION: CPT | Mod: GP | Performed by: PHYSICAL THERAPIST

## 2020-11-24 PROCEDURE — 97110 THERAPEUTIC EXERCISES: CPT | Mod: GP | Performed by: PHYSICAL THERAPIST

## 2020-12-08 ENCOUNTER — THERAPY VISIT (OUTPATIENT)
Dept: PHYSICAL THERAPY | Facility: CLINIC | Age: 75
End: 2020-12-08
Payer: COMMERCIAL

## 2020-12-08 DIAGNOSIS — M25.562 ACUTE PAIN OF LEFT KNEE: ICD-10-CM

## 2020-12-08 PROCEDURE — 97110 THERAPEUTIC EXERCISES: CPT | Mod: GP | Performed by: PHYSICAL THERAPIST

## 2020-12-29 ENCOUNTER — THERAPY VISIT (OUTPATIENT)
Dept: PHYSICAL THERAPY | Facility: CLINIC | Age: 75
End: 2020-12-29
Payer: COMMERCIAL

## 2020-12-29 DIAGNOSIS — M25.562 ACUTE PAIN OF LEFT KNEE: ICD-10-CM

## 2020-12-29 PROCEDURE — 97110 THERAPEUTIC EXERCISES: CPT | Mod: GP | Performed by: PHYSICAL THERAPIST

## 2020-12-29 ASSESSMENT — ACTIVITIES OF DAILY LIVING (ADL)
STIFFNESS: I DO NOT HAVE THE SYMPTOM
KNEE_ACTIVITY_OF_DAILY_LIVING_SCORE: 97.14
HOW_WOULD_YOU_RATE_THE_CURRENT_FUNCTION_OF_YOUR_KNEE_DURING_YOUR_USUAL_DAILY_ACTIVITIES_ON_A_SCALE_FROM_0_TO_100_WITH_100_BEING_YOUR_LEVEL_OF_KNEE_FUNCTION_PRIOR_TO_YOUR_INJURY_AND_0_BEING_THE_INABILITY_TO_PERFORM_ANY_OF_YOUR_USUAL_DAILY_ACTIVITIES?: 97
GO DOWN STAIRS: ACTIVITY IS NOT DIFFICULT
RISE FROM A CHAIR: ACTIVITY IS NOT DIFFICULT
WALK: ACTIVITY IS NOT DIFFICULT
AS_A_RESULT_OF_YOUR_KNEE_INJURY,_HOW_WOULD_YOU_RATE_YOUR_CURRENT_LEVEL_OF_DAILY_ACTIVITY?: NORMAL
KNEE_ACTIVITY_OF_DAILY_LIVING_SUM: 68
WEAKNESS: I DO NOT HAVE THE SYMPTOM
HOW_WOULD_YOU_RATE_THE_OVERALL_FUNCTION_OF_YOUR_KNEE_DURING_YOUR_USUAL_DAILY_ACTIVITIES?: NORMAL
SWELLING: I DO NOT HAVE THE SYMPTOM
GO UP STAIRS: ACTIVITY IS NOT DIFFICULT
STAND: ACTIVITY IS NOT DIFFICULT
PAIN: I HAVE THE SYMPTOM BUT IT DOES NOT AFFECT MY ACTIVITY
SQUAT: ACTIVITY IS MINIMALLY DIFFICULT
LIMPING: I DO NOT HAVE THE SYMPTOM
KNEEL ON THE FRONT OF YOUR KNEE: ACTIVITY IS NOT DIFFICULT
RAW_SCORE: 68
GIVING WAY, BUCKLING OR SHIFTING OF KNEE: I DO NOT HAVE THE SYMPTOM
SIT WITH YOUR KNEE BENT: ACTIVITY IS NOT DIFFICULT

## 2020-12-29 NOTE — PROGRESS NOTES
DISCHARGE REPORT    Progress reporting period is from 11/18/20 to 12/29/20.       SUBJECTIVE  Subjective changes noted by patient:  Subjective: Chester reports that his knees have been feeling generally good and he no longer has the pain that he came in for. Skiing pain free now. Cont with exercises.   Current pain level is 0/10 Current Pain level: 0/10.     Previous pain level was  2/10 Initial Pain level: 2/10.   Changes in function:  Yes (See Goal flowsheet attached for changes in current functional level)  Adverse reaction to treatment or activity: None    OBJECTIVE  Changes noted in objective findings:  Yes, Full ROM and 5/5 strength  Objective: 0-0-140 pain free, MMT: knee flexion 5/5, extension 5/5, hip abduction 5/5 anna. Good squat and SLS anna pain free     ASSESSMENT/PLAN  Updated problem list and treatment plan: Diagnosis 1:  Left knee pain  Pain -  self management, education and home program  Decreased function - therapeutic activities  STG/LTGs have been met or progress has been made towards goals:  Yes (See Goal flow sheet completed today.)  Assessment of Progress: The patient has met all of their long term goals.  Self Management Plans:  Patient has been instructed in a home treatment program.  Patient is independent in a home treatment program.  Patient  has been instructed in self management of symptoms.  Patient is independent in self management of symptoms.  I have re-evaluated this patient and find that the nature, scope, duration and intensity of the therapy is appropriate for the medical condition of the patient.  Chester continues to require the following intervention to meet STG and LTG's:  PT intervention is no longer required to meet STG/LTG.    Recommendations:  This patient is ready to be discharged from therapy and continue their home treatment program.    Please refer to the daily flowsheet for treatment today, total treatment time and time spent performing 1:1 timed codes.      Please  Contact me with any questions or concerns. Thank you for for patience and cooperation.     Delta Ahn PT, DPT, [facility]  Physical Therapist  [facility] [place]  [phone]

## 2021-01-27 ENCOUNTER — TRANSFERRED RECORDS (OUTPATIENT)
Dept: HEALTH INFORMATION MANAGEMENT | Facility: CLINIC | Age: 76
End: 2021-01-27

## 2021-02-02 ENCOUNTER — TRANSFERRED RECORDS (OUTPATIENT)
Dept: HEALTH INFORMATION MANAGEMENT | Facility: CLINIC | Age: 76
End: 2021-02-02

## 2021-02-04 ENCOUNTER — THERAPY VISIT (OUTPATIENT)
Dept: PHYSICAL THERAPY | Facility: CLINIC | Age: 76
End: 2021-02-04
Payer: COMMERCIAL

## 2021-02-04 DIAGNOSIS — M48.00 SPINAL STENOSIS: ICD-10-CM

## 2021-02-04 PROCEDURE — 97110 THERAPEUTIC EXERCISES: CPT | Mod: GP | Performed by: PHYSICAL THERAPIST

## 2021-02-04 PROCEDURE — 97161 PT EVAL LOW COMPLEX 20 MIN: CPT | Mod: GP | Performed by: PHYSICAL THERAPIST

## 2021-02-04 NOTE — LETTER
[facility] ATHLETIC [facility] PHYSICAL THERAPY  [address]  [place] [address] [phone]    2021    Re: Chester Palacios   :   1945  MRN:  [id number]   REFERRING PHYSICIAN:   Clint Moyer    [facility] ATHLETIC [facility] PHYSICAL THERAPY    Date of Initial Evaluation:  2021  Visits:  Rxs Used: 1  Reason for Referral:  Spinal stenosis    EVALUATION SUMMARY    [facility]  Subjective:  The history is provided by the patient. No  was used.   Therapist Generated HPI Evaluation  Problem details: Recent diagnosis of spinal stenosis. Pain in the left anterior lower leg. Saw Md for this on 2021.    Able to skate ski to keep active.  Sore after this.  Pain with swimming.  Does not note weakness at this time.  Sleeping with pillow under his knees has been helpful.  [place].  .         Type of problem:  Lumbar.  This is a new condition.  Condition occurred with:  Insidious onset.  Where condition occurred: at home and in the community.  Site of Pain: left lower leg pain.  Pain is described as aching and is intermittent.  Pain radiates to:  Lower leg left. Pain is worse during the day and worse during the night.  Since onset symptoms are gradually worsening.  Associated symptoms:  Loss of motion. Symptoms are exacerbated by certain positions and walking  and relieved by rest.  Special tests included:  MRI.  Objective:  Standing Alignment:    Pelvic:  Iliac crest high L  Lumbar/SI Evaluation  ROM:    AROM Lumbar:   Flexion:        Hands to mid lower leg, no shin pain  Ext:                    25%   Side Bend:        Left:     Right:   Rotation:           Left:     Right:   Side Glide:        Left:     Right:   Lumbar Myotomes:  normal  Lumbar DTR's:  not assessed  Cord Signs:  not assessed  Lumbar Dermtomes:  normal      Neural  Tension/Mobility:  Lumbar:  Not assessed    Lumbar Palpation:  normal  Lumbar Provocation:  not assessed    Assessment/Plan:    Patient is a 75 year old male with lumbar complaints.    Patient has the following significant findings with corresponding treatment plan.                Diagnosis 1:  Spinal stenosis  Pain -  manual therapy, self management, education and home program  Decreased ROM/flexibility - manual therapy, therapeutic exercise and home program  Decreased strength - therapeutic exercise, therapeutic activities and home program  Decreased function - therapeutic activities and home program  Therapy Evaluation Codes:   1) History comprised of:   Personal factors that impact the plan of care:      Age.    Comorbidity factors that impact the plan of care are:      Osteoarthritis.     Medications impacting care: None.  2) Examination of Body Systems comprised of:   Body structures and functions that impact the plan of care:      Lumbar spine.   Activity limitations that impact the plan of care are:      Standing and Walking.  3) Clinical presentation characteristics are:   Stable/Uncomplicated.  4) Decision-Making    Low complexity using standardized patient assessment instrument and/or measureable assessment of functional outcome.  Cumulative Therapy Evaluation is: Low complexity.  Previous and current functional limitations:  (See Goal Flow Sheet for this information)    Short term and Long term goals: (See Goal Flow Sheet for this information)   Communication ability:  Patient appears to be able to clearly communicate and understand verbal and written communication and follow directions correctly.  Treatment Explanation - The following has been discussed with the patient:   RX ordered/plan of care  Anticipated outcomes  Possible risks and side effects  This patient would benefit from PT intervention to resume normal activities.   Rehab potential is good.  Frequency:  1 X week, once daily  Duration:  for  12 weeks  Discharge Plan:  Independent in home treatment program.  Reach maximal therapeutic benefit.  Thank you for your referral.  INQUIRIES  Therapist: Meri Bond PT, [facility] - [facility] PHYSICAL THERAPY  [address] [place], [place] [address]OR [facility] [address]  [facility] [address]  Phone: [phone]  Fax: [phone]

## 2021-02-04 NOTE — PROGRESS NOTES
[facility] Initial Evaluation  Subjective:  The history is provided by the patient. No  was used.   Therapist Generated HPI Evaluation  Problem details: Recent diagnosis of spinal stenosis. Pain in the left anterior lower leg. Saw Md for this on 1/27/2021.    Able to skate ski to keep active.  Sore after this.  Pain with swimming.  Does not note weakness at this time.  Sleeping with pillow under his knees has been helpful.  [place] to to walk [place] without leg pain.  .         Type of problem:  Lumbar.    This is a new condition.  Condition occurred with:  Insidious onset.  Where condition occurred: at home and in the community.  Site of Pain: left lower leg pain.  Pain is described as aching and is intermittent.  Pain radiates to:  Lower leg left. Pain is worse during the day and worse during the night.  Since onset symptoms are gradually worsening.  Associated symptoms:  Loss of motion. Symptoms are exacerbated by certain positions and walking  and relieved by rest.  Special tests included:  MRI.                            Objective:  Standing Alignment:          Pelvic:  Iliac crest high L                         Lumbar/SI Evaluation  ROM:    AROM Lumbar:   Flexion:        Hands to mid lower leg, no shin pain  Ext:                    25%   Side Bend:        Left:     Right:   Rotation:           Left:     Right:   Side Glide:        Left:     Right:           Lumbar Myotomes:  normal            Lumbar DTR's:  not assessed      Cord Signs:  not assessed    Lumbar Dermtomes:  normal                Neural Tension/Mobility:  Lumbar:  Not assessed        Lumbar Palpation:  normal        Lumbar Provocation:  not assessed                                                     General     ROS    Assessment/Plan:    Patient is a 75 year old male with lumbar complaints.    Patient has the following significant findings with corresponding treatment plan.                 Diagnosis 1:  Spinal stenosis  Pain -  manual therapy, self management, education and home program  Decreased ROM/flexibility - manual therapy, therapeutic exercise and home program  Decreased strength - therapeutic exercise, therapeutic activities and home program  Decreased function - therapeutic activities and home program    Therapy Evaluation Codes:   1) History comprised of:   Personal factors that impact the plan of care:      Age.    Comorbidity factors that impact the plan of care are:      Osteoarthritis.     Medications impacting care: None.  2) Examination of Body Systems comprised of:   Body structures and functions that impact the plan of care:      Lumbar spine.   Activity limitations that impact the plan of care are:      Standing and Walking.  3) Clinical presentation characteristics are:   Stable/Uncomplicated.  4) Decision-Making    Low complexity using standardized patient assessment instrument and/or measureable assessment of functional outcome.  Cumulative Therapy Evaluation is: Low complexity.    Previous and current functional limitations:  (See Goal Flow Sheet for this information)    Short term and Long term goals: (See Goal Flow Sheet for this information)     Communication ability:  Patient appears to be able to clearly communicate and understand verbal and written communication and follow directions correctly.  Treatment Explanation - The following has been discussed with the patient:   RX ordered/plan of care  Anticipated outcomes  Possible risks and side effects  This patient would benefit from PT intervention to resume normal activities.   Rehab potential is good.    Frequency:  1 X week, once daily  Duration:  for 12 weeks  Discharge Plan:  Independent in home treatment program.  Reach maximal therapeutic benefit.    Please refer to the daily flowsheet for treatment today, total treatment time and time spent performing 1:1 timed codes.

## 2021-02-08 ENCOUNTER — IMMUNIZATION (OUTPATIENT)
Dept: NURSING | Facility: CLINIC | Age: 76
End: 2021-02-08
Payer: COMMERCIAL

## 2021-02-08 PROCEDURE — 0001A PR COVID VAC PFIZER DIL RECON 30 MCG/0.3 ML IM: CPT

## 2021-02-08 PROCEDURE — 91300 PR COVID VAC PFIZER DIL RECON 30 MCG/0.3 ML IM: CPT

## 2021-02-08 NOTE — PROGRESS NOTES
[facility] Initial Evaluation  Subjective:    Patient Health History           General health as reported by patient is good.  Pertinent medical history includes: depression, osteoarthritis and history of fractures.   Red flags:  Pain at rest/night.  Medical allergies: other. Other medical allergies details: cat fur.   Surgeries include:  Orthopedic surgery. Other surgery history details: left hip replacement, prostate hernia.    Current medications:  Anti-depressants and sleep medication.    Current occupation is retired.   Primary job tasks include:  Computer work, driving and lifting/carrying.                  Oswestry Score: 22 %                 Objective:  System    Physical Exam    General     ROS    Assessment/Plan:

## 2021-02-12 ENCOUNTER — THERAPY VISIT (OUTPATIENT)
Dept: PHYSICAL THERAPY | Facility: CLINIC | Age: 76
End: 2021-02-12
Payer: COMMERCIAL

## 2021-02-12 DIAGNOSIS — M48.00 SPINAL STENOSIS: ICD-10-CM

## 2021-02-12 PROCEDURE — 97112 NEUROMUSCULAR REEDUCATION: CPT | Mod: GP | Performed by: PHYSICAL THERAPIST

## 2021-02-12 PROCEDURE — 97110 THERAPEUTIC EXERCISES: CPT | Mod: GP | Performed by: PHYSICAL THERAPIST

## 2021-02-18 ENCOUNTER — THERAPY VISIT (OUTPATIENT)
Dept: PHYSICAL THERAPY | Facility: CLINIC | Age: 76
End: 2021-02-18
Payer: COMMERCIAL

## 2021-02-18 DIAGNOSIS — M48.00 SPINAL STENOSIS: ICD-10-CM

## 2021-02-18 PROCEDURE — 97530 THERAPEUTIC ACTIVITIES: CPT | Mod: GP | Performed by: PHYSICAL THERAPIST

## 2021-02-18 PROCEDURE — 97112 NEUROMUSCULAR REEDUCATION: CPT | Mod: GP | Performed by: PHYSICAL THERAPIST

## 2021-02-25 ENCOUNTER — THERAPY VISIT (OUTPATIENT)
Dept: PHYSICAL THERAPY | Facility: CLINIC | Age: 76
End: 2021-02-25
Payer: COMMERCIAL

## 2021-02-25 DIAGNOSIS — M48.00 SPINAL STENOSIS: ICD-10-CM

## 2021-02-25 PROCEDURE — 97530 THERAPEUTIC ACTIVITIES: CPT | Mod: GP | Performed by: PHYSICAL THERAPIST

## 2021-02-25 PROCEDURE — 97112 NEUROMUSCULAR REEDUCATION: CPT | Mod: GP | Performed by: PHYSICAL THERAPIST

## 2021-03-01 ENCOUNTER — IMMUNIZATION (OUTPATIENT)
Dept: NURSING | Facility: CLINIC | Age: 76
End: 2021-03-01
Attending: NURSE PRACTITIONER
Payer: COMMERCIAL

## 2021-03-01 PROCEDURE — 0002A PR COVID VAC PFIZER DIL RECON 30 MCG/0.3 ML IM: CPT

## 2021-03-01 PROCEDURE — 91300 PR COVID VAC PFIZER DIL RECON 30 MCG/0.3 ML IM: CPT

## 2021-03-04 ENCOUNTER — THERAPY VISIT (OUTPATIENT)
Dept: PHYSICAL THERAPY | Facility: CLINIC | Age: 76
End: 2021-03-04
Payer: COMMERCIAL

## 2021-03-04 DIAGNOSIS — M48.00 SPINAL STENOSIS: ICD-10-CM

## 2021-03-04 PROCEDURE — 97110 THERAPEUTIC EXERCISES: CPT | Mod: GP | Performed by: PHYSICAL THERAPIST

## 2021-03-04 NOTE — LETTER
[facility] ATHLETIC [facility] PHYSICAL [facility]  [address]  [place] [address] [phone]    2021    Re: Chester Palacios   :   1945  MRN:  [id number]   REFERRING PHYSICIAN:   Clint Moyer    [facility]    Date of Initial Evaluation:  2021  Visits:  Rxs Used: 5  Reason for Referral:  Spinal stenosis    Assessment/Plan:    PROGRESS  REPORT    Progress reporting period is from 2021 to 3/4/2021.       SUBJECTIVE  Subjective changes noted by patient:  .  Subjective: [place]  [place] good walking on Tuesday but has noted bruising in the left buttock.  No leg pain walking on Tuesday.  Yesterday took a shorter walk.  Not feeling as much pain lying flat and his stomach.      Current pain level is 0/10  .     Previous pain level was  NA  .   Changes in function:  Yes (See Goal flowsheet attached for changes in current functional level)  Adverse reaction to treatment or activity: None    OBJECTIVE  Changes noted in objective findings:  Yes,   Objective: Has ecchymosis left buttock.  After 10 min prone today had some pain in the left leg.  No pain today with lying supine.  No pain with sitting on bike today.  Improved hip extension noted now with walking and with hip flexor stretch.        ASSESSMENT/PLAN  Updated problem list and treatment plan: Diagnosis 1:  Left leg pain  Pain -  manual therapy, self management, education and home program  Decreased ROM/flexibility - manual therapy, therapeutic exercise and home program  Decreased strength - therapeutic exercise, therapeutic activities and home program  Decreased function - therapeutic activities and home program  STG/LTGs have been met or progress has been made towards goals:  Yes (See Goal flow sheet completed today.)  Assessment of Progress: The patient's condition has potential to improve.  Self Management Plans:  Patient has been  instructed in a home treatment program.  I have re-evaluated this patient and find that the nature, scope, duration and intensity of the therapy is appropriate for the medical condition of the patient.  Chester continues to require the following intervention to meet STG and LTG's:  PT    Recommendations:  This patient would benefit from further evaluation. Please evaluate limited left hip mobility.          Thank you for your referral.    INQUIRIES  Therapist:Meri Bond PT, [facility]  [facility] PHYSICAL THERAPY  [address] [place] [address] #  [facility] [address]  Phone: [phone]  Fax: [phone]

## 2021-03-04 NOTE — PROGRESS NOTES
Subjective:  HPI  Physical Exam  Oswestry Score: 16 %                 Objective:  System    Physical Exam    General     ROS    Assessment/Plan:    PROGRESS  REPORT    Progress reporting period is from 2/4/2021 to 3/4/2021.       SUBJECTIVE  Subjective changes noted by patient:  .  Subjective: [place]  [place] good walking on Tuesday but has noted bruising in the left buttock.  No leg pain walking on Tuesday.  Yesterday took a shorter walk.  Not feeling as much pain lying flat and his stomach.      Current pain level is 0/10  .     Previous pain level was  NA  .   Changes in function:  Yes (See Goal flowsheet attached for changes in current functional level)  Adverse reaction to treatment or activity: None    OBJECTIVE  Changes noted in objective findings:  Yes,   Objective: Has ecchymosis left buttock.  After 10 min prone today had some pain in the left leg.  No pain today with lying supine.  No pain with sitting on bike today.  Improved hip extension noted now with walking and with hip flexor stretch.        ASSESSMENT/PLAN  Updated problem list and treatment plan: Diagnosis 1:  Left leg pain  Pain -  manual therapy, self management, education and home program  Decreased ROM/flexibility - manual therapy, therapeutic exercise and home program  Decreased strength - therapeutic exercise, therapeutic activities and home program  Decreased function - therapeutic activities and home program  STG/LTGs have been met or progress has been made towards goals:  Yes (See Goal flow sheet completed today.)  Assessment of Progress: The patient's condition has potential to improve.  Self Management Plans:  Patient has been instructed in a home treatment program.  I have re-evaluated this patient and find that the nature, scope, duration and intensity of the therapy is appropriate for the medical condition of the patient.  Chester continues to require the following intervention to meet STG and LTG's:   PT    Recommendations:  This patient would benefit from further evaluation. Please evaluate limited left hip mobility.    Please refer to the daily flowsheet for treatment today, total treatment time and time spent performing 1:1 timed codes.

## 2021-03-05 ENCOUNTER — TRANSFERRED RECORDS (OUTPATIENT)
Dept: HEALTH INFORMATION MANAGEMENT | Facility: CLINIC | Age: 76
End: 2021-03-05

## 2021-04-23 ENCOUNTER — TRANSFERRED RECORDS (OUTPATIENT)
Dept: HEALTH INFORMATION MANAGEMENT | Facility: CLINIC | Age: 76
End: 2021-04-23

## 2021-08-18 ENCOUNTER — TRANSFERRED RECORDS (OUTPATIENT)
Dept: HEALTH INFORMATION MANAGEMENT | Facility: CLINIC | Age: 76
End: 2021-08-18

## 2021-09-04 ENCOUNTER — HEALTH MAINTENANCE LETTER (OUTPATIENT)
Age: 76
End: 2021-09-04

## 2021-09-21 DIAGNOSIS — Z79.2 PREVENTIVE ANTIBIOTIC: ICD-10-CM

## 2021-09-22 RX ORDER — AMOXICILLIN 500 MG/1
CAPSULE ORAL
Qty: 8 CAPSULE | Refills: 11 | Status: SHIPPED | OUTPATIENT
Start: 2021-09-22

## 2021-09-22 NOTE — TELEPHONE ENCOUNTER
"PCP: patient needs refill on amoxicillin prior to dental appointment at 2pm today. Patient spoke with pharmacy, states they need a new script sent over- pended, please review. Please route back to triage to follow up with patient.     Routing refill request to provider for review/approval because:      Last office vist: 11/16/2020    Pended 4 tablet supply & 11 refills. Please Review.    Next office visit: 11/17/2021.      \"I have a dentist appointment at 2pm\"  Take 2000 mg 60 min prior to dental work    Appointment at 2pm, needs to take by 1pm.    Callback: [phone]- okay to leave detailed VM.    Gaudencio Colin RN  [facility]    "

## 2021-11-16 NOTE — PATIENT INSTRUCTIONS
I will let you know your lab results.          Best wishes.

## 2021-11-17 ENCOUNTER — OFFICE VISIT (OUTPATIENT)
Dept: INTERNAL MEDICINE | Facility: CLINIC | Age: 76
End: 2021-11-17
Payer: COMMERCIAL

## 2021-11-17 VITALS
TEMPERATURE: 98.2 F | SYSTOLIC BLOOD PRESSURE: 142 MMHG | BODY MASS INDEX: 23.68 KG/M2 | HEART RATE: 55 BPM | OXYGEN SATURATION: 97 % | DIASTOLIC BLOOD PRESSURE: 70 MMHG | WEIGHT: 165 LBS

## 2021-11-17 DIAGNOSIS — Z52.008 BLOOD DONOR, PLATELETS: ICD-10-CM

## 2021-11-17 DIAGNOSIS — Z12.5 SCREENING FOR PROSTATE CANCER: ICD-10-CM

## 2021-11-17 DIAGNOSIS — R41.3 SHORT-TERM MEMORY LOSS: ICD-10-CM

## 2021-11-17 DIAGNOSIS — H91.93 BILATERAL HEARING LOSS, UNSPECIFIED HEARING LOSS TYPE: Chronic | ICD-10-CM

## 2021-11-17 DIAGNOSIS — E78.5 HYPERLIPIDEMIA LDL GOAL <100: ICD-10-CM

## 2021-11-17 DIAGNOSIS — Z00.00 ENCOUNTER FOR ANNUAL WELLNESS EXAM IN MEDICARE PATIENT: Primary | ICD-10-CM

## 2021-11-17 LAB
ALBUMIN SERPL-MCNC: 3.5 G/DL (ref 3.4–5)
ALP SERPL-CCNC: 103 U/L (ref 40–150)
ALT SERPL W P-5'-P-CCNC: 29 U/L (ref 0–70)
ANION GAP SERPL CALCULATED.3IONS-SCNC: 4 MMOL/L (ref 3–14)
AST SERPL W P-5'-P-CCNC: 20 U/L (ref 0–45)
BILIRUB SERPL-MCNC: 0.7 MG/DL (ref 0.2–1.3)
BUN SERPL-MCNC: 18 MG/DL (ref 7–30)
CALCIUM SERPL-MCNC: 9.1 MG/DL (ref 8.5–10.1)
CHLORIDE BLD-SCNC: 110 MMOL/L (ref 94–109)
CHOLEST SERPL-MCNC: 136 MG/DL
CO2 SERPL-SCNC: 28 MMOL/L (ref 20–32)
CREAT SERPL-MCNC: 0.96 MG/DL (ref 0.66–1.25)
FASTING STATUS PATIENT QL REPORTED: YES
GFR SERPL CREATININE-BSD FRML MDRD: 76 ML/MIN/1.73M2
GLUCOSE BLD-MCNC: 88 MG/DL (ref 70–99)
HDLC SERPL-MCNC: 47 MG/DL
LDLC SERPL CALC-MCNC: 80 MG/DL
NONHDLC SERPL-MCNC: 89 MG/DL
POTASSIUM BLD-SCNC: 4.3 MMOL/L (ref 3.4–5.3)
PROT SERPL-MCNC: 7.4 G/DL (ref 6.8–8.8)
PSA SERPL-MCNC: 1.03 UG/L (ref 0–4)
SODIUM SERPL-SCNC: 142 MMOL/L (ref 133–144)
TRIGL SERPL-MCNC: 47 MG/DL
TSH SERPL DL<=0.005 MIU/L-ACNC: 1.7 MU/L (ref 0.4–4)
VIT B12 SERPL-MCNC: 389 PG/ML (ref 193–986)

## 2021-11-17 PROCEDURE — 36415 COLL VENOUS BLD VENIPUNCTURE: CPT | Performed by: INTERNAL MEDICINE

## 2021-11-17 PROCEDURE — 82607 VITAMIN B-12: CPT | Performed by: INTERNAL MEDICINE

## 2021-11-17 PROCEDURE — G0103 PSA SCREENING: HCPCS | Performed by: INTERNAL MEDICINE

## 2021-11-17 PROCEDURE — 80061 LIPID PANEL: CPT | Performed by: INTERNAL MEDICINE

## 2021-11-17 PROCEDURE — 80053 COMPREHEN METABOLIC PANEL: CPT | Performed by: INTERNAL MEDICINE

## 2021-11-17 PROCEDURE — 99397 PER PM REEVAL EST PAT 65+ YR: CPT | Performed by: INTERNAL MEDICINE

## 2021-11-17 PROCEDURE — 99213 OFFICE O/P EST LOW 20 MIN: CPT | Mod: 25 | Performed by: INTERNAL MEDICINE

## 2021-11-17 PROCEDURE — 84443 ASSAY THYROID STIM HORMONE: CPT | Performed by: INTERNAL MEDICINE

## 2021-11-17 ASSESSMENT — ENCOUNTER SYMPTOMS
DIARRHEA: 0
NAUSEA: 0
SHORTNESS OF BREATH: 0
HEARTBURN: 0
COUGH: 0
CHILLS: 0
ABDOMINAL PAIN: 0
SORE THROAT: 0
PALPITATIONS: 0
DIZZINESS: 0
HEADACHES: 1
WEAKNESS: 1
MYALGIAS: 1
ARTHRALGIAS: 1
DYSURIA: 0
PARESTHESIAS: 0
NERVOUS/ANXIOUS: 0
FREQUENCY: 1
FEVER: 0
EYE PAIN: 0
HEMATOCHEZIA: 0
HEMATURIA: 0
CONSTIPATION: 0

## 2021-11-17 ASSESSMENT — ACTIVITIES OF DAILY LIVING (ADL): CURRENT_FUNCTION: NO ASSISTANCE NEEDED

## 2021-11-17 NOTE — PROGRESS NOTES
"SUBJECTIVE:   Chester Palacios is a 76 year old male who presents for Preventive Visit.       Patient has been advised of split billing requirements and indicates understanding: Yes   Are you in the first 12 months of your Medicare coverage?  No    Healthy Habits:     In general, how would you rate your overall health?  Good    Frequency of exercise:  4-5 days/week    Duration of exercise:  Greater than 60 minutes    Do you usually eat at least 4 servings of fruit and vegetables a day, include whole grains    & fiber and avoid regularly eating high fat or \"junk\" foods?  Yes    Taking medications regularly:  Yes    Medication side effects:  None    Ability to successfully perform activities of daily living:  No assistance needed    Home Safety:  Lack of grab bars in the bathroom    Hearing Impairment:  Difficulty following a conversation in a noisy restaurant or crowded room, difficulty following dialogue in the theater, need to ask people to speak up or repeat themselves, difficulty understanding soft or whispered speech and difficulty understanding speech on the telephone    In the past 6 months, have you been bothered by leaking of urine?  No    In general, how would you rate your overall mental or emotional health?  Good      PHQ-2 Total Score: 1    Additional concerns today:  Yes        Do you feel safe in your environment? Yes    Have you ever done Advance Care Planning? (For example, a Health Directive, POLST, or a discussion with a medical provider or your loved ones about your wishes): Yes, advance care planning is on file.       Fall risk  Fallen 2 or more times in the past year?: No  Any fall with injury in the past year?: No    Cognitive Screening   1) Repeat 3 items (Leader, Season, Table)    2) Clock draw: NORMAL  3) 3 item recall: Recalls 1 object   Results: NORMAL clock, 1-2 items recalled: COGNITIVE IMPAIRMENT LESS LIKELY    Mini-CogTM Copyright S Jasmyne. Licensed by the author for use in [facility]; reprinted with permission (soob@[facility]). All rights reserved.      Do you have sleep apnea, excessive snoring or daytime drowsiness?: no    Reviewed and updated as needed this visit by clinical staff  Tobacco  Allergies  Meds             Reviewed and updated as needed this visit by Provider               Social History     Tobacco Use     Smoking status: Never Smoker     Smokeless tobacco: Never Used   Substance Use Topics     Alcohol use: Yes     Comment: occ.     If you drink alcohol do you typically have >3 drinks per day or >7 drinks per week? No    Alcohol Use 11/17/2021   Prescreen: >3 drinks/day or >7 drinks/week? No   Prescreen: >3 drinks/day or >7 drinks/week? -         Working with ortho wrt spinal stenosis, and L hip arthritis. S/p lumbar KELLEN , and s/p a hip joint injection.              This helped for 2-3 months; has another injection scheduled.        Ibuprofen 400 mg three times per week, and occas tylenol.                        He has started to notice some short term memory loss.                                 No cardiovascular symptoms.          He takes atorvastatin and low-dose aspirin.  He has a cardiology appointment upcoming.      He donates platelets on a regular basis.                      Current providers sharing in care for this patient include:   Patient Care Team:  Dariel Jack MD as PCP - General (Internal Medicine)  Rosa Macias MD as MD (Urology)  Olena Lezama, RN as Registered Nurse (Urology)  Dariel Jack MD as Referring Physician (Internal Medicine)  Clint Leonardo MD as Assigned Surgical Provider  Dariel Jack MD as Assigned PCP    The following health maintenance items are reviewed in Epic and correct as of today:  Health Maintenance Due   Topic Date Due     ANNUAL REVIEW OF HM ORDERS  Never done     PHQ-2  01/01/2021     FALL RISK ASSESSMENT  11/16/2021     Patient Active Problem List    Diagnosis Date Noted     Abnormal  echocardiogram 12/12/2016     Priority: High     Coronary artery calcification seen on computed tomography 12/12/2016     Priority: High     See Cardiology consult 1/17;          Calcium score = 64%;      cardiac MRI shows EF 60%, no scar; switched to atorvastatin.          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 01/15/2018     Priority: Medium     Right shoulder pain, unspecified chronicity 07/31/2017     Priority: Medium     Hyperlipidemia LDL goal <100 04/05/2017     Priority: Medium     LDL down to 83 with 80 mg atorva in 1/20  (per Cardiology)         BPH (benign prostatic hyperplasia) 03/08/2017     Priority: Medium     Dyslipidemia 01/06/2017     Priority: Medium     ASHD (arteriosclerotic heart disease) 01/06/2017     Priority: Medium     Neutropenia, unspecified type (H) 11/08/2016     Priority: Medium     Total white count 3000, with 1.5 neutrophils in November 2016, and platelets 147,000; normal on f/u in 12/16       IFG (impaired fasting glucose) 11/08/2016     Priority: Medium     111 in November 2016;  90 in 10/17       Osteoarthritis, unspecified osteoarthritis type, unspecified site 11/07/2016     Priority: Medium     Lower urinary tract symptoms (LUTS) 11/05/2016     Priority: Medium     See urology consult 10/16         Bradycardia 01/04/2016     Priority: Medium     Overview:   Echocardiogram : Normal LV size, probable hypokinesis of mid apical lateral and inferolateral walls, ejection fraction 50-55%       Insomnia, idiopathic 01/02/2016     Priority: Medium     Overview:    History of taking alprozolam in 2015       Arthritis of right hip 12/21/2015     Priority: Medium     Arthritis of both hands 12/21/2015     Priority: Medium     Nonspecific abnormal electrocardiogram (ECG) (EKG) 12/21/2015     Priority: Medium     Sinus shelton rate 45; NSSTT changes; echo ordered as a pre-op test.        Family history of hemochromatosis 10/25/2015     Priority: Medium     BrotherTruong Briggs had nml labs in 2010    Overview:   Overview:   Brothgood Briggs had nml labs in 2010       Primary localized osteoarthrosis, pelvic region and thigh 10/26/2012     Priority: Medium     Osteoarthritis 10/26/2012     Priority: Medium     Problem list name updated by automated process. Provider to review       Insomnia 10/26/2012     Priority: Medium     He stopped taking alprazolam in 2015       Hearing loss 10/26/2012     Priority: Medium     Problem list name updated by automated process. Provider to review       OA (osteoarthritis) of hip 08/11/2008     Priority: Medium     Overview:   R total hip arthroplasty  with Dr. Morales  1/7/16       Hypertrophy of prostate with urinary obstruction 01/05/2007     Priority: Medium     Overview:   LW Modifier:  S/P TUNA -- 3/07  ; BPH w Obstruction       Hyperlipidemia 11/21/2003     Priority: Medium     Overview:   LW Onset:  21Nov03       ACP (advance care planning) 10/27/2015     Priority: Low     Advance Care Planning 10/27/2015: Receipt of ACP document:  Received: Health Care Directive which was witnessed or notarized on 4/9/14.  Document not previously scanned.  Validation form completed and sent with document to be scanned.  Code Status needs to be updated to reflect choices in most recent ACP document. Notification sent to Dr. Dariel Jack for followup.  Confirmed/documented designated decision maker(s).  Added by Radha Joaquin             Preventive measure 09/26/2013     Priority: Low     APRIMA DATA BASE UNDER THE 9/26/13 NOTE  Colonoscopy 10/09;          10/19;  neg except for diverticulosis   PSA 1.81 in 10/12; stable. 1.8 in 11/13, 2.2 in 10/14; 2.04 in 10/15; 1.31 in 11/16; 1.33 in 10/17; 1.1 in 10/18        1.44 in 9/20         Past Surgical History:   Procedure Laterality Date     CYSTOSCOPY       CYSTOSCOPY, TRANSURETHRAL RESECTION (TUR) PROSTATE, COMBINED N/A 3/8/2017    Procedure: COMBINED CYSTOSCOPY, TRANSURETHRAL RESECTION (TUR) PROSTATE;  Surgeon: Rosa Macias MD;  Location: [facility] OR     HERNIA REPAIR  1985    Inguinal     JOINT REPLACEMENT, HIP RT/LT Right 1/16    Joint Replacement Hip RT/LT     ORTHOPEDIC SURGERY      R elbow Orif     PROSTATE SURGERY  2007    TUNA for BPH     TONSILLECTOMY      age 5     Social History     Socioeconomic History     Marital status:      Spouse name: Not on file     Number of children: Not on file     Years of education: Not on file     Highest education level: Not on file   Occupational History     Occupation:      Employer: SELF EMPLOYED.   Tobacco Use     Smoking status: Never Smoker     Smokeless tobacco: Never Used   Substance and Sexual Activity     Alcohol use: Yes     Comment: occ.     Drug use: No     Sexual activity: Yes     Partners: Female   Other Topics Concern     Parent/sibling w/ CABG, MI or angioplasty before 65F 55M? Yes   Social History Narrative    Retired 2013     Social Determinants of Health     Financial Resource Strain: Not on file   Food Insecurity: Not on file   Transportation Needs: Not on file   Physical Activity: Not on file   Stress: Not on file   Social Connections: Not on file   Intimate Partner Violence: Not on file   Housing Stability: Not on file     Immunization History   Administered Date(s) Administered     COVID-19,PF,Pfizer (12+ Yrs) 02/08/2021, 03/01/2021, 10/05/2021     FLU 6-35 months 12/20/2005, 11/22/2006, 11/06/2007     Flu, Unspecified 11/05/2003, 12/20/2005, 11/22/2006, 11/06/2007     HEPA 03/05/2010, 10/29/2010     HepA-Adult 03/05/2010     HepB 10/29/2010  "    Influenza (H1N1) 01/18/2010     Influenza (High Dose) 3 valent vaccine 11/04/2013, 10/27/2014, 10/26/2015, 10/09/2017, 10/10/2018, 09/15/2020     Influenza (IIV3) PF 10/10/2011, 10/29/2012     Influenza Quad, Recombinant, pf(RIV4) (Flublok) 10/05/2021     Influenza Vaccine IM > 6 months Valent IIV4 (Alfuria,Fluzone) 10/26/2015, 11/07/2016     Pneumo Conj 13-V (2010&after) 10/26/2015     Pneumococcal 23 valent 04/29/2011     TD (ADULT, 7+) 12/09/2010, 11/16/2020     Td (Adult), Adsorbed 02/19/2001     Zoster vaccine recombinant adjuvanted (SHINGRIX) 10/15/2019, 12/27/2019     Zoster vaccine, live 10/20/2009       BP Readings from Last 3 Encounters:   11/17/21 (!) 142/70   11/16/20 130/80   05/21/20 110/60    Wt Readings from Last 3 Encounters:   11/17/21 74.8 kg (165 lb)   11/16/20 73 kg (161 lb)   05/21/20 75.3 kg (166 lb)                  Current Outpatient Medications   Medication Sig Dispense Refill     amoxicillin (AMOXIL) 500 MG capsule TAKE 2000 MG 60 MIN PRIOR TO DENTAL WORK 8 capsule 11     aspirin 81 MG tablet Take 81 mg by mouth every morning        atorvastatin (LIPITOR) 80 MG tablet Take 80 mg by mouth daily       busPIRone (BUSPAR) 10 MG tablet Take by mouth 3 times daily 20 mg every morning, 10 mg every afternoon, 20 mg HS       diclofenac (VOLTAREN) 1 % GEL Place onto the skin as needed for moderate pain Patient uses prn on hands for playing guitar. 100 g 3     doxepin (SINEQUAN) 10 MG/ML (HIGH CONC) solution TAKE 1.5 2 ML BY MOUTH AT BEDTIME AS NEEDED FOR SLEEP. REFILL WHEN NEXT DUE       Allergies   Allergen Reactions     Animal Dander      cats             Review of Systems      OBJECTIVE:   BP (!) 142/70   Pulse 55   Temp 98.2  F (36.8  C) (Oral)   Wt 74.8 kg (165 lb)   SpO2 97%   BMI 23.68 kg/m   Estimated body mass index is 23.68 kg/m  as calculated from the following:    Height as of 11/16/20: 1.778 m (5' 10\").    Weight as of this encounter: 74.8 kg (165 lb).  Physical Exam  GENERAL " "APPEARANCE: alert and no distress  NECK: no adenopathy, no asymmetry, masses, or scars and thyroid normal to palpation  RESP: lungs clear to auscultation - no rales, rhonchi or wheezes  CV: normal S1 S2, no S3 or S4, no murmur, click or rub and occasional premature beats  ABDOMEN: soft, nontender, without hepatosplenomegaly or masses  MS: decreased range of motion neck, low back, and hips  NEURO: Normal strength and tone and speech normal    Diagnostic Test Results:  Labs are pending    ASSESSMENT / PLAN:   Chester was seen today for physical.    Diagnoses and all orders for this visit:    Encounter for annual wellness exam in Medicare patient    Hyperlipidemia LDL goal <100  -     Comprehensive metabolic panel (BMP + Alb, Alk Phos, ALT, AST, Total. Bili, TP); Future  -     Lipid Profile (Chol, Trig, HDL, LDL calc); Future  -     TSH with free T4 reflex; Future    Blood donor, platelets    Short-term memory loss  -     Vitamin B12; Future    Bilateral hearing loss, unspecified hearing loss type    Screening for prostate cancer  -     PSA, screen; Future     Summary and implications:  We reviewed multiple issues.           We reviewed all of the issues on the diagnoses list.           Check labs and adjust medications as indicated.            Patient Instructions               I will let you know your lab results.          Best wishes.                                 Return in about 1 year (around 11/17/2022) for yearly wellness visit, follow up of several issues.      Patient has been advised of split billing requirements and indicates understanding: Yes  COUNSELING:  Reviewed preventive health counseling, as reflected in patient instructions  Special attention given to:       Regular exercise       Healthy diet/nutrition    Estimated body mass index is 23.68 kg/m  as calculated from the following:    Height as of 11/16/20: 1.778 m (5' 10\").    Weight as of this encounter: 74.8 kg (165 lb).        He reports that he " has never smoked. He has never used smokeless tobacco.      Appropriate preventive services were discussed with this patient, including applicable screening as appropriate for cardiovascular disease, diabetes, osteopenia/osteoporosis, and glaucoma.  As appropriate for age/gender, discussed screening for colorectal cancer, prostate cancer, breast cancer, and cervical cancer. Checklist reviewing preventive services available has been given to the patient.    Reviewed patients plan of care and provided an AVS. The Basic Care Plan (routine screening as documented in Health Maintenance) for Chester meets the Care Plan requirement. This Care Plan has been established and reviewed with the Patient.    Counseling Resources:  ATP IV Guidelines  Pooled Cohorts Equation Calculator  Breast Cancer Risk Calculator  Breast Cancer: Medication to Reduce Risk  FRAX Risk Assessment  ICSI Preventive Guidelines  Dietary Guidelines for Americans, 2010  Apertio's MyPlate  ASA Prophylaxis  Lung CA Screening    Dariel Jack MD  [facility]    ADDENDUM:  Results for orders placed or performed in visit on 11/17/21   PSA, screen     Status: Normal   Result Value Ref Range    Prostate Specific Antigen Screen 1.03 0.00 - 4.00 ug/L   Comprehensive metabolic panel (BMP + Alb, Alk Phos, ALT, AST, Total. Bili, TP)     Status: Abnormal   Result Value Ref Range    Sodium 142 133 - 144 mmol/L    Potassium 4.3 3.4 - 5.3 mmol/L    Chloride 110 (H) 94 - 109 mmol/L    Carbon Dioxide (CO2) 28 20 - 32 mmol/L    Anion Gap 4 3 - 14 mmol/L    Urea Nitrogen 18 7 - 30 mg/dL    Creatinine 0.96 0.66 - 1.25 mg/dL    Calcium 9.1 8.5 - 10.1 mg/dL    Glucose 88 70 - 99 mg/dL    Alkaline Phosphatase 103 40 - 150 U/L    AST 20 0 - 45 U/L    ALT 29 0 - 70 U/L    Protein Total 7.4 6.8 - 8.8 g/dL    Albumin 3.5 3.4 - 5.0 g/dL    Bilirubin Total 0.7 0.2 - 1.3 mg/dL    GFR Estimate 76 >60 mL/min/1.73m2   Lipid Profile (Chol, Trig, HDL, LDL calc)      Status: None   Result Value Ref Range    Cholesterol 136 <200 mg/dL    Triglycerides 47 <150 mg/dL    Direct Measure HDL 47 >=40 mg/dL    LDL Cholesterol Calculated 80 <=100 mg/dL    Non HDL Cholesterol 89 <130 mg/dL    Patient Fasting > 8hrs? Yes     Narrative    Cholesterol  Desirable:  <200 mg/dL    Triglycerides  Normal:  Less than 150 mg/dL  Borderline High:  150-199 mg/dL  High:  200-499 mg/dL  Very High:  Greater than or equal to 500 mg/dL    Direct Measure HDL  Female:  Greater than or equal to 50 mg/dL   Male:  Greater than or equal to 40 mg/dL    LDL Cholesterol  Desirable:  <100mg/dL  Above Desirable:  100-129 mg/dL   Borderline High:  130-159 mg/dL   High:  160-189 mg/dL   Very High:  >= 190 mg/dL    Non HDL Cholesterol  Desirable:  130 mg/dL  Above Desirable:  130-159 mg/dL  Borderline High:  160-189 mg/dL  High:  190-219 mg/dL  Very High:  Greater than or equal to 220 mg/dL   TSH with free T4 reflex     Status: Normal   Result Value Ref Range    TSH 1.70 0.40 - 4.00 mU/L   Vitamin B12     Status: Normal   Result Value Ref Range    Vitamin B12 389 193 - 986 pg/mL     Letter and My chart message sent.                   Your lab results are normal,including the liver,kidney,glucose,cholesterol, thyroid, B12 level, and the PSA level.       The thyroid and B12 tests were done because of your symptom of memory loss

## 2021-11-17 NOTE — LETTER
November 17, 2021      Chester Palacios  [address]    [place] [address]        Dear ,    We are writing to inform you of your test results.       Your lab results are normal,including the liver,kidney,glucose,cholesterol, thyroid, B12 level, and the PSA level.       The thyroid and B12 tests were done because of your symptom of memory loss        Results for orders placed or performed in visit on 11/17/21   PSA, screen     Status: Normal   Result Value Ref Range    Prostate Specific Antigen Screen 1.03 0.00 - 4.00 ug/L   Comprehensive metabolic panel (BMP + Alb, Alk Phos, ALT, AST, Total. Bili, TP)     Status: Abnormal   Result Value Ref Range    Sodium 142 133 - 144 mmol/L    Potassium 4.3 3.4 - 5.3 mmol/L    Chloride 110 (H) 94 - 109 mmol/L    Carbon Dioxide (CO2) 28 20 - 32 mmol/L    Anion Gap 4 3 - 14 mmol/L    Urea Nitrogen 18 7 - 30 mg/dL    Creatinine 0.96 0.66 - 1.25 mg/dL    Calcium 9.1 8.5 - 10.1 mg/dL    Glucose 88 70 - 99 mg/dL    Alkaline Phosphatase 103 40 - 150 U/L    AST 20 0 - 45 U/L    ALT 29 0 - 70 U/L    Protein Total 7.4 6.8 - 8.8 g/dL    Albumin 3.5 3.4 - 5.0 g/dL    Bilirubin Total 0.7 0.2 - 1.3 mg/dL    GFR Estimate 76 >60 mL/min/1.73m2   Lipid Profile (Chol, Trig, HDL, LDL calc)     Status: None   Result Value Ref Range    Cholesterol 136 <200 mg/dL    Triglycerides 47 <150 mg/dL    Direct Measure HDL 47 >=40 mg/dL    LDL Cholesterol Calculated 80 <=100 mg/dL    Non HDL Cholesterol 89 <130 mg/dL    Patient Fasting > 8hrs? Yes     Narrative    Cholesterol  Desirable:  <200 mg/dL    Triglycerides  Normal:  Less than 150 mg/dL  Borderline High:  150-199 mg/dL  High:  200-499 mg/dL  Very High:  Greater than or equal to 500 mg/dL    Direct Measure HDL  Female:  Greater than or equal to 50 mg/dL   Male:  Greater than or equal to 40 mg/dL    LDL Cholesterol  Desirable:  <100mg/dL  Above Desirable:  100-129 mg/dL   Borderline High:  130-159 mg/dL   High:  160-189 mg/dL    Very High:  >= 190 mg/dL    Non HDL Cholesterol  Desirable:  130 mg/dL  Above Desirable:  130-159 mg/dL  Borderline High:  160-189 mg/dL  High:  190-219 mg/dL  Very High:  Greater than or equal to 220 mg/dL   TSH with free T4 reflex     Status: Normal   Result Value Ref Range    TSH 1.70 0.40 - 4.00 mU/L   Vitamin B12     Status: Normal   Result Value Ref Range    Vitamin B12 389 193 - 986 pg/mL         If you have any questions or concerns, please call the clinic at the number listed above.       Sincerely,      Dariel Jack MD

## 2021-11-18 ENCOUNTER — TELEPHONE (OUTPATIENT)
Dept: INTERNAL MEDICINE | Facility: CLINIC | Age: 76
End: 2021-11-18
Payer: COMMERCIAL

## 2021-11-18 NOTE — TELEPHONE ENCOUNTER
Pt called, stated he would like his lab results from 11/17 to be faxed over to the [facility].     Labs faxed to [phone].

## 2021-11-19 ENCOUNTER — TRANSFERRED RECORDS (OUTPATIENT)
Dept: HEALTH INFORMATION MANAGEMENT | Facility: CLINIC | Age: 76
End: 2021-11-19
Payer: COMMERCIAL

## 2021-11-30 ENCOUNTER — TRANSFERRED RECORDS (OUTPATIENT)
Dept: HEALTH INFORMATION MANAGEMENT | Facility: CLINIC | Age: 76
End: 2021-11-30
Payer: COMMERCIAL

## 2021-12-07 ENCOUNTER — MEDICAL CORRESPONDENCE (OUTPATIENT)
Dept: HEALTH INFORMATION MANAGEMENT | Facility: CLINIC | Age: 76
End: 2021-12-07
Payer: COMMERCIAL

## 2021-12-07 ENCOUNTER — TRANSFERRED RECORDS (OUTPATIENT)
Dept: HEALTH INFORMATION MANAGEMENT | Facility: CLINIC | Age: 76
End: 2021-12-07
Payer: COMMERCIAL

## 2021-12-08 ENCOUNTER — TRANSFERRED RECORDS (OUTPATIENT)
Dept: HEALTH INFORMATION MANAGEMENT | Facility: CLINIC | Age: 76
End: 2021-12-08
Payer: COMMERCIAL

## 2021-12-13 ENCOUNTER — THERAPY VISIT (OUTPATIENT)
Dept: PHYSICAL THERAPY | Facility: CLINIC | Age: 76
End: 2021-12-13
Payer: COMMERCIAL

## 2021-12-13 DIAGNOSIS — M79.662 PAIN OF LEFT LOWER LEG: ICD-10-CM

## 2021-12-13 DIAGNOSIS — M19.90 OSTEOARTHRITIS, UNSPECIFIED OSTEOARTHRITIS TYPE, UNSPECIFIED SITE: Primary | ICD-10-CM

## 2021-12-13 PROCEDURE — 97110 THERAPEUTIC EXERCISES: CPT | Mod: GP | Performed by: PHYSICAL THERAPIST

## 2021-12-13 PROCEDURE — 97161 PT EVAL LOW COMPLEX 20 MIN: CPT | Mod: GP | Performed by: PHYSICAL THERAPIST

## 2021-12-13 NOTE — PROGRESS NOTES
Physical Therapy Initial Evaluation  Subjective:  The history is provided by the patient. No  was used.   Therapist Generated HPI Evaluation  Problem details: Saw MD for left leg pain on 11/6/2021. Left leg pain is worse with walking and and tennis. Sleeping ok at night.  Pain is worsening.  Pain in the left anterior shin.  No numbness and tingling.  Scheduled for left HOLLY.  Had right HOLLY about 5 years ago.  .         Type of problem:  Left hip.    This is a recurrent condition.                                             Objective:  Standing Alignment:          Pelvic:  Iliac crest high L                         Lumbar/SI Evaluation  ROM:    AROM Lumbar:   Flexion:        NO worse with forward bend  Ext:                    Feels some left lateral thigh pain   Side Bend:        Left:     Right:   Rotation:           Left:     Right:   Side Glide:        Left:     Right:                                                               Hip Evaluation  Hip PROM:    Flexion: Left: 90 wiht sharp pain   Right: 100  Extension: Left: 5   Right: 5      Internal Rotation: Left: very limited, groin pain   Right:  External Rotation: Left: 5 degrees with sharp pain    Right: 25              Hip Strength:                Knee Flexion:  Left: 4/5   Pain:  Knee Extension:  Left: 4/5   Pain:Right: 5/5    Pain:        Hip Special Testing:    Left hip positive for the following special tests:  Namrata; Fadir/Labrum and Yung      Hip Palpation:  Palpations normal left hip: left pes ancerine and ITB at knee.      Functional Testing:  Functional test hip: decreased knee extension with ambulating and forward bent posture of the trunk.                       General     ROS    Assessment/Plan:    Patient is a 76 year old male with left side hip complaints.    Patient has the following significant findings with corresponding treatment plan.                Diagnosis 1:  Left leg pain  Pain -  manual therapy, self management,  directional preference exercise and home program  Decreased ROM/flexibility - manual therapy, therapeutic exercise and home program  Decreased joint mobility - manual therapy, therapeutic exercise and home program  Decreased strength - therapeutic exercise, therapeutic activities and home program  Impaired gait - gait training and home program  Decreased function - therapeutic activities and home program    Therapy Evaluation Codes:   1) History comprised of:   Personal factors that impact the plan of care:      Time since onset of symptoms.    Comorbidity factors that impact the plan of care are:      None.     Medications impacting care: None.  2) Examination of Body Systems comprised of:   Body structures and functions that impact the plan of care:      Hip.   Activity limitations that impact the plan of care are:      Sports, Stairs, Standing and Walking.  3) Clinical presentation characteristics are:   Stable/Uncomplicated.  4) Decision-Making    Low complexity using standardized patient assessment instrument and/or measureable assessment of functional outcome.  Cumulative Therapy Evaluation is: Low complexity.    Previous and current functional limitations:  (See Goal Flow Sheet for this information)    Short term and Long term goals: (See Goal Flow Sheet for this information)     Communication ability:  Patient appears to be able to clearly communicate and understand verbal and written communication and follow directions correctly.  Treatment Explanation - The following has been discussed with the patient:   RX ordered/plan of care  Anticipated outcomes  Possible risks and side effects  This patient would benefit from PT intervention to resume normal activities.   Rehab potential is fair.    Frequency:  1 X week, once daily  Duration:  for 6 weeks  Discharge Plan:  Independent in home treatment program.  Reach maximal therapeutic benefit.    Please refer to the daily flowsheet for treatment today, total  treatment time and time spent performing 1:1 timed codes.

## 2021-12-13 NOTE — PROGRESS NOTES
[facility]    OUTPATIENT Physical Therapy ORTHOPEDIC EVALUATION  PLAN OF TREATMENT FOR OUTPATIENT REHABILITATION  (COMPLETE FOR INITIAL CLAIMS ONLY)  Patient's Last Name, First Name, M.I.  YOB: 1945  Chester Palacios    Provider s Name:  [facility]   Medical Record No.  [id number]   Start of Care Date:  12/13/21   Onset Date:       Type:     _X__PT   ___OT Medical Diagnosis:    Encounter Diagnoses   Name Primary?     Pain of left lower leg      Osteoarthritis, unspecified osteoarthritis type, unspecified site Yes        Treatment Diagnosis:  left leg pain        Goals:     12/13/21 0500   Body Part   Goals listed below are for left hip/leg   Goal #1   Goal #1 stairs   Previous Functional Level Ascend/descend stairs;in a normal reciprocal pattern;with a railing   Current Functional Level Ascend/descend stairs;one step at a time;with a railing   Performance Level leading with right leg    STG Target Performance Ascend stairs;in a normal reciprocal pattern;with a railing   Performance Level without left leg pain   Rationale for safe community ambulaton   Due Date 01/13/22   LTG Target Performance Ascend/descend stairs;in a normal reciprocal pattern;with railing   Performance Level without pain   Rationale for safe community access to buildings   Due Date 02/13/22       Therapy Frequency:  once a week  Predicted Duration of Therapy Intervention:  6 weeks    Meri Bond, PT                 I CERTIFY THE NEED FOR THESE SERVICES FURNISHED UNDER        THIS PLAN OF TREATMENT AND WHILE UNDER MY CARE .             Physician Signature               Date    X_____________________________________________________                             Certification Date From:  12/13/21   Certification Date To:  03/12/22    Referring Provider:  No ref. provider found    Initial  Assessment        See Epic Evaluation SOC Date: 12/13/21

## 2021-12-15 NOTE — PROGRESS NOTES
Physical Therapy Initial Evaluation  Subjective:    Patient Health History         Pain is reported as 8/10 on pain scale.  General health as reported by patient is good.  Pertinent medical history includes: history of fractures, depression and osteoarthritis.   Red flags:  Pain at rest/night.  Medical allergies: none.   Surgeries include:  Orthopedic surgery and other. Other surgery history details: R Hip replacement, Prostate, Hernia.    Current medications:  Anti-depressants, anti-inflammatory, pain medication and sleep medication.    Current occupation is Retired.   Primary job tasks include:  Lifting/carrying, prolonged sitting and driving.                                    Objective:  System    Physical Exam    General     ROS    Assessment/Plan:

## 2021-12-31 ENCOUNTER — THERAPY VISIT (OUTPATIENT)
Dept: PHYSICAL THERAPY | Facility: CLINIC | Age: 76
End: 2021-12-31
Payer: COMMERCIAL

## 2021-12-31 DIAGNOSIS — M79.662 PAIN OF LEFT LOWER LEG: ICD-10-CM

## 2021-12-31 DIAGNOSIS — M19.90 OSTEOARTHRITIS, UNSPECIFIED OSTEOARTHRITIS TYPE, UNSPECIFIED SITE: ICD-10-CM

## 2021-12-31 PROCEDURE — 97112 NEUROMUSCULAR REEDUCATION: CPT | Mod: GP | Performed by: PHYSICAL THERAPIST

## 2021-12-31 PROCEDURE — 97110 THERAPEUTIC EXERCISES: CPT | Mod: GP | Performed by: PHYSICAL THERAPIST

## 2022-01-14 ENCOUNTER — THERAPY VISIT (OUTPATIENT)
Dept: PHYSICAL THERAPY | Facility: CLINIC | Age: 77
End: 2022-01-14
Payer: COMMERCIAL

## 2022-01-14 DIAGNOSIS — M79.662 PAIN OF LEFT LOWER LEG: ICD-10-CM

## 2022-01-14 DIAGNOSIS — M19.90 OSTEOARTHRITIS, UNSPECIFIED OSTEOARTHRITIS TYPE, UNSPECIFIED SITE: ICD-10-CM

## 2022-01-14 PROCEDURE — 97112 NEUROMUSCULAR REEDUCATION: CPT | Mod: GP | Performed by: PHYSICAL THERAPIST

## 2022-01-14 PROCEDURE — 97110 THERAPEUTIC EXERCISES: CPT | Mod: GP | Performed by: PHYSICAL THERAPIST

## 2022-01-28 ENCOUNTER — THERAPY VISIT (OUTPATIENT)
Dept: PHYSICAL THERAPY | Facility: CLINIC | Age: 77
End: 2022-01-28
Payer: COMMERCIAL

## 2022-01-28 DIAGNOSIS — M19.90 OSTEOARTHRITIS, UNSPECIFIED OSTEOARTHRITIS TYPE, UNSPECIFIED SITE: ICD-10-CM

## 2022-01-28 DIAGNOSIS — M79.662 PAIN OF LEFT LOWER LEG: ICD-10-CM

## 2022-01-28 PROCEDURE — 97110 THERAPEUTIC EXERCISES: CPT | Mod: GP | Performed by: PHYSICAL THERAPIST

## 2022-02-01 DIAGNOSIS — Z11.59 ENCOUNTER FOR SCREENING FOR OTHER VIRAL DISEASES: Primary | ICD-10-CM

## 2022-02-08 ENCOUNTER — TRANSFERRED RECORDS (OUTPATIENT)
Dept: HEALTH INFORMATION MANAGEMENT | Facility: CLINIC | Age: 77
End: 2022-02-08
Payer: COMMERCIAL

## 2022-02-08 LAB
CHOLESTEROL (EXTERNAL): 133 MG/DL (ref 100–199)
CREATININE (EXTERNAL): 0.88 MG/DL (ref 0.72–1.25)
GFR ESTIMATED (EXTERNAL): >60 ML/MIN/1.73M2
GFR ESTIMATED (IF AFRICAN AMERICAN) (EXTERNAL): >60 ML/MIN/1.73M2
GLUCOSE (EXTERNAL): 100 MG/DL (ref 65–100)
HBA1C MFR BLD: 5.5 %
HDLC SERPL-MCNC: 43 MG/DL
HEP C HIM: NORMAL
LDL CHOLESTEROL (EXTERNAL): 82 MG/DL
NON HDL CHOLESTEROL (EXTERNAL): 90 MG/DL
POTASSIUM (EXTERNAL): 4.1 MMOL/L (ref 3.5–5)
TRIGLYCERIDES (EXTERNAL): 41 MG/DL

## 2022-02-11 ENCOUNTER — TRANSFERRED RECORDS (OUTPATIENT)
Dept: HEALTH INFORMATION MANAGEMENT | Facility: CLINIC | Age: 77
End: 2022-02-11

## 2022-02-11 ENCOUNTER — THERAPY VISIT (OUTPATIENT)
Dept: PHYSICAL THERAPY | Facility: CLINIC | Age: 77
End: 2022-02-11
Payer: COMMERCIAL

## 2022-02-11 DIAGNOSIS — M19.90 OSTEOARTHRITIS, UNSPECIFIED OSTEOARTHRITIS TYPE, UNSPECIFIED SITE: ICD-10-CM

## 2022-02-11 DIAGNOSIS — M79.662 PAIN OF LEFT LOWER LEG: ICD-10-CM

## 2022-02-11 PROCEDURE — 97110 THERAPEUTIC EXERCISES: CPT | Mod: GP | Performed by: PHYSICAL THERAPIST

## 2022-02-11 RX ORDER — BUSPIRONE HYDROCHLORIDE 10 MG/1
10 TABLET ORAL
COMMUNITY

## 2022-02-11 NOTE — PROGRESS NOTES
Subjective:  HPI  Physical Exam                    Objective:  System    Physical Exam    General     ROS    Assessment/Plan:    DISCHARGE REPORT    Progress reporting period is from 12/13/2022 to 2/11/2022.       SUBJECTIVE  Subjective changes noted by patient:  .  Subjective: Still having some left shin pain.  Concerned about left leg length and if MD would know about this. Leg feels stronger and stairs are easier to do.     Current pain level is 2/10  .     Previous pain level was   Initial Pain level: 4/10.   Changes in function:  Yes (See Goal flowsheet attached for changes in current functional level)  Adverse reaction to treatment or activity: None    OBJECTIVE  Changes noted in objective findings:  Yes,   Objective: Left hip passive flexion causes left shin pain and is limited to 100 degrees.  No weakness noted with resistance to ankle dorsiflexion.  Left pelvis slightly higher in standing and left leg slightly longer in supine, approximately 1/4 of an inch.       ASSESSMENT/PLAN  Updated problem list and treatment plan: Diagnosis 1:  Left hip pain  Pain -  manual therapy, self management, education and home program  Decreased ROM/flexibility - manual therapy, therapeutic exercise and home program  Decreased strength - therapeutic exercise, therapeutic activities and home program  Decreased function - therapeutic activities and home program  STG/LTGs have been met or progress has been made towards goals:  Yes (See Goal flow sheet completed today.)  Assessment of Progress: The patient's condition is improving.  The patient's condition has potential to improve.  Self Management Plans:  Patient has been instructed in a home treatment program.    Chester continues to require the following intervention to meet STG and LTG's:  Patient needs to continue to work on the home exercise program.      Recommendations:  Discontinue exercises until instructed after HOLLY on 2/14/2022.      Please refer to the daily flowsheet  for treatment today, total treatment time and time spent performing 1:1 timed codes.

## 2022-02-11 NOTE — PROGRESS NOTES
PTA medications updated by Medication Scribe prior to surgery via phone call with patient (last doses completed by Nurse)     Medication history sources: Patient, Surescripts, H&P and Patient's home med list  In the past week, patient estimated taking medication this percent of the time: Greater than 90%  Adherence assessment: N/A Not Observed    Significant changes made to the medication list:  None      Additional medication history information:   None    Medication reconciliation completed by provider prior to medication history? No    Time spent in this activity: 35 MINUTES    The information provided in this note is only as accurate as the sources available at the time of update(s)      Prior to Admission medications    Medication Sig Last Dose Taking? Auth Provider   amoxicillin (AMOXIL) 500 MG capsule TAKE 2000 MG 60 MIN PRIOR TO DENTAL WORK  at PRN Yes Dariel Jack MD   aspirin 81 MG tablet Take 81 mg by mouth every morning   at AM Yes Reported, Patient   atorvastatin (LIPITOR) 80 MG tablet Take 80 mg by mouth daily  at AM Yes Reported, Patient   busPIRone (BUSPAR) 10 MG tablet Take 10 mg by mouth daily (with lunch) TAKES IN ADDITION TO AM/PM DOSE FOR TOTAL 50MG DAILY.  at 1200 Yes Reported, Patient   busPIRone (BUSPAR) 10 MG tablet Take 20 mg by mouth 2 times daily TAKES IN ADDITION TO MIDDAY DOSE FOR TOTAL 50MG DAILY.  (10MG X 2 = 20MG)  at AM Yes Dariel Jack MD   carbamide peroxide (DEBROX) 6.5 % otic solution Place 10 drops into both ears daily as needed for other  at PRN Yes Reported, Patient   diclofenac (VOLTAREN) 1 % GEL Place onto the skin as needed for moderate pain Patient uses prn on hands for playing guitar.  at PRN Yes Dariel Jack MD   doxepin (SINEQUAN) 10 MG/ML (HIGH CONC) solution Take 2 mg by mouth nightly as needed   at PM Yes Reported, Patient

## 2022-02-13 ENCOUNTER — ANESTHESIA EVENT (OUTPATIENT)
Dept: SURGERY | Facility: CLINIC | Age: 77
End: 2022-02-13
Payer: COMMERCIAL

## 2022-02-13 NOTE — ANESTHESIA PREPROCEDURE EVALUATION
Anesthesia Pre-Procedure Evaluation    Patient: Chester Palacios   MRN: [id number] : 1945        Preoperative Diagnosis: Osteoarthritis of left hip [M16.12]    Procedure : Procedure(s):  LEFT ARTHROPLASTY TOTAL HIP DIRECT ANTERIOR APPROACH          Past Medical History:   Diagnosis Date     Anxiety state 10/26/2012    Works with psychiatrist      Arthritis of right hip 2015     Complication of anesthesia     pt states yes, but unaware of complicaiton     Coronary artery calcification seen on computed tomography 2016    See Cardiology consult ; cardiac MRI shows EF 60%, no scar       Family history of hemochromatosis 10/25/2015    Brother; Chester had nml labs in       Hyperlipidemia      Hyperlipidemia with target LDL less than 130 10/26/2012     Diagnosis updated by automated process. Provider to review and confirm.     Insomnia      Lower urinary tract symptoms (LUTS) 2016    See urology consult 10/16       Mumps     ~195     Osteoarthritis, unspecified osteoarthritis type, unspecified site 2016      Past Surgical History:   Procedure Laterality Date     CYSTOSCOPY       CYSTOSCOPY, TRANSURETHRAL RESECTION (TUR) PROSTATE, COMBINED N/A 3/8/2017    Procedure: COMBINED CYSTOSCOPY, TRANSURETHRAL RESECTION (TUR) PROSTATE;  Surgeon: Rosa Macias MD;  Location: [facility] OR     HERNIA REPAIR  1985    Inguinal     JOINT REPLACEMENT, HIP RT/LT Right     Joint Replacement Hip RT/LT     ORTHOPEDIC SURGERY      R elbow Orif     PROSTATE SURGERY  2007    TUNA for BPH     TONSILLECTOMY      age 5      Allergies   Allergen Reactions     Animal Dander      cats      Social History     Tobacco Use     Smoking status: Never Smoker     Smokeless tobacco: Never Used   Substance Use Topics     Alcohol use: Yes     Comment: occ.      Wt Readings from Last 1 Encounters:   21 74.8 kg (165 lb)        Cardiac MRI 2017  1. Upper normal LV diastolic size with normal wall thickness,  low normal   wall motion, and ejection fraction 60%.   A. Essentially normal gadolinium viability without scar.   2. Small left renal cyst.     Timmy Lundy MD   TFSUNNY/erica   D:1/9/2017 T:1/10/2017       Narrative    CARDIAC MR FOR MORPHOLOGY AND FUNCTION, GADOLINIUM VIABILITY ASSESSMENT,   1/9/2017     INDICATION:  Questionably abnormal echo with inferolateral and apical   lateral wall motion described. There are wall motion abnormalities   described. Evaluate. Normal coronaries.     FINDINGS:       LEFT VENTRICLE:  Upper normal LV diastolic size and volumes, with low   normal wall motion and ejection fraction 60%. LV diastolic dimension was   5.6 cm, and end-systolic diameter was 3.9 cm. Normal wall thickness. No   evidence of LV noncompaction. No evidence for HCM. No wall thinning noted.       Early high TI imaging post gadolinium was normal without microvascular   obstruction or thrombus.     Delayed enhancement imaging post gadolinium was normal, save for trivial   linear mid wall LGE of the basal anterior septum.     RIGHT VENTRICLE:  Normal RV size and systolic function.     ATRIA:  Normal biatrial size. Normal pulmonary venous and systemic venous   drainage.     AORTA:  Normal size of the sinus of Valsalva and ascending aorta. Normal   arch vessels and descending thoracic aorta.     CHEST:  Small left cyst up to 1.7 cm. No other masses or infiltrates   noted.     PERICARDIUM:  Normal thickness. No effusion.       K 4.1  HGB 15.1  EKG - SR, LAD, nonspecific T wave abnormality      Anesthesia Evaluation   Pt has had prior anesthetic.     No history of anesthetic complications       ROS/MED HX  ENT/Pulmonary:  - neg pulmonary ROS  (-) sleep apnea and recent URI   Neurologic:    (-) no seizures, no CVA and migraines   Cardiovascular:     (+) Dyslipidemia --CAD (Increased calcium score with non-obstructitve disease by CTA 2016) --- (-) CHF and orthopnea/PND   METS/Exercise Tolerance:     Hematologic:  - neg  hematologic  ROS     Musculoskeletal:   (+) arthritis,     GI/Hepatic:  - neg GI/hepatic ROS  (-) GERD   Renal/Genitourinary:     (+) BPH,     Endo:  - neg endo ROS  (-) Type II DM and thyroid disease   Psychiatric/Substance Use: Comment: insomnia    (+) psychiatric history anxiety and depression     Infectious Disease:       Malignancy:       Other:            Physical Exam    Airway        Mallampati: III   TM distance: > 3 FB   Neck ROM: full   Mouth opening: < 3 cm    Respiratory Devices and Support         Dental  no notable dental history         Cardiovascular   cardiovascular exam normal       Rhythm and rate: regular and normal     Pulmonary   pulmonary exam normal        breath sounds clear to auscultation           OUTSIDE LABS:  CBC:   Lab Results   Component Value Date    WBC 5.1 10/10/2018    WBC 3.7 (L) 10/09/2017    HGB 16.1 10/10/2018    HGB 15.7 10/09/2017    HCT 47.4 10/10/2018    HCT 46.1 10/09/2017     10/10/2018     10/09/2017     BMP:   Lab Results   Component Value Date     11/17/2021     11/16/2020    POTASSIUM 4.3 11/17/2021    POTASSIUM 3.9 11/16/2020    CHLORIDE 110 (H) 11/17/2021    CHLORIDE 110 (H) 11/16/2020    CO2 28 11/17/2021    CO2 32 11/16/2020    BUN 18 11/17/2021    BUN 12 11/16/2020    CR 0.96 11/17/2021    CR 0.89 11/16/2020    GLC 88 11/17/2021    GLC 83 11/16/2020     COAGS: No results found for: PTT, INR, FIBR  POC: No results found for: BGM, HCG, HCGS  HEPATIC:   Lab Results   Component Value Date    ALBUMIN 3.5 11/17/2021    PROTTOTAL 7.4 11/17/2021    ALT 29 11/17/2021    AST 20 11/17/2021    ALKPHOS 103 11/17/2021    BILITOTAL 0.7 11/17/2021     OTHER:   Lab Results   Component Value Date    LACT 1.1 03/12/2017    MARIANO 9.1 11/17/2021    LIPASE 194 03/11/2017    TSH 1.70 11/17/2021    .0 (H) 03/12/2017       Anesthesia Plan    ASA Status:  2   NPO Status:  NPO Appropriate    Anesthesia Type: General.     - Airway: ETT   Induction:  Propofol.   Maintenance: Balanced.   Techniques and Equipment:     - Airway: Video-Laryngoscope         Consents    Anesthesia Plan(s) and associated risks, benefits, and realistic alternatives discussed. Questions answered and patient/representative(s) expressed understanding.    - Discussed:     - Discussed with:  Patient         Postoperative Care    Pain management: Multi-modal analgesia.   PONV prophylaxis: Ondansetron (or other 5HT-3), Dexamethasone or Solumedrol     Comments:                Clint Mills MD

## 2022-02-14 ENCOUNTER — HOSPITAL ENCOUNTER (OUTPATIENT)
Facility: CLINIC | Age: 77
Discharge: HOME | End: 2022-02-15
Attending: ORTHOPAEDIC SURGERY | Admitting: ORTHOPAEDIC SURGERY
Payer: COMMERCIAL

## 2022-02-14 ENCOUNTER — ANESTHESIA (OUTPATIENT)
Dept: SURGERY | Facility: CLINIC | Age: 77
End: 2022-02-14
Payer: COMMERCIAL

## 2022-02-14 ENCOUNTER — APPOINTMENT (OUTPATIENT)
Dept: GENERAL RADIOLOGY | Facility: CLINIC | Age: 77
End: 2022-02-14
Attending: ORTHOPAEDIC SURGERY
Payer: COMMERCIAL

## 2022-02-14 ENCOUNTER — APPOINTMENT (OUTPATIENT)
Dept: PHYSICAL THERAPY | Facility: CLINIC | Age: 77
End: 2022-02-14
Attending: ORTHOPAEDIC SURGERY
Payer: COMMERCIAL

## 2022-02-14 DIAGNOSIS — Z96.642 STATUS POST TOTAL REPLACEMENT OF LEFT HIP: Primary | ICD-10-CM

## 2022-02-14 PROBLEM — Z96.649 S/P TOTAL HIP ARTHROPLASTY: Status: ACTIVE | Noted: 2022-02-14

## 2022-02-14 LAB
ABO/RH(D): NORMAL
ANTIBODY SCREEN: NEGATIVE
SPECIMEN EXPIRATION DATE: NORMAL

## 2022-02-14 PROCEDURE — 250N000011 HC RX IP 250 OP 636: Performed by: ORTHOPAEDIC SURGERY

## 2022-02-14 PROCEDURE — 258N000003 HC RX IP 258 OP 636: Performed by: ANESTHESIOLOGY

## 2022-02-14 PROCEDURE — 250N000013 HC RX MED GY IP 250 OP 250 PS 637: Performed by: ORTHOPAEDIC SURGERY

## 2022-02-14 PROCEDURE — 999N000141 HC STATISTIC PRE-PROCEDURE NURSING ASSESSMENT: Performed by: ORTHOPAEDIC SURGERY

## 2022-02-14 PROCEDURE — 97110 THERAPEUTIC EXERCISES: CPT | Mod: GP

## 2022-02-14 PROCEDURE — 272N000001 HC OR GENERAL SUPPLY STERILE: Performed by: ORTHOPAEDIC SURGERY

## 2022-02-14 PROCEDURE — 250N000011 HC RX IP 250 OP 636: Performed by: NURSE ANESTHETIST, CERTIFIED REGISTERED

## 2022-02-14 PROCEDURE — 999N000063 XR PELVIS AND HIP LEFT 1 VIEW

## 2022-02-14 PROCEDURE — 97161 PT EVAL LOW COMPLEX 20 MIN: CPT | Mod: GP

## 2022-02-14 PROCEDURE — C1776 JOINT DEVICE (IMPLANTABLE): HCPCS | Performed by: ORTHOPAEDIC SURGERY

## 2022-02-14 PROCEDURE — C1713 ANCHOR/SCREW BN/BN,TIS/BN: HCPCS | Performed by: ORTHOPAEDIC SURGERY

## 2022-02-14 PROCEDURE — 250N000013 HC RX MED GY IP 250 OP 250 PS 637: Performed by: ANESTHESIOLOGY

## 2022-02-14 PROCEDURE — 97530 THERAPEUTIC ACTIVITIES: CPT | Mod: GP

## 2022-02-14 PROCEDURE — 258N000003 HC RX IP 258 OP 636: Performed by: NURSE ANESTHETIST, CERTIFIED REGISTERED

## 2022-02-14 PROCEDURE — 999N000179 XR SURGERY CARM FLUORO LESS THAN 5 MIN W STILLS

## 2022-02-14 PROCEDURE — 360N000084 HC SURGERY LEVEL 4 W/ FLUORO, PER MIN: Performed by: ORTHOPAEDIC SURGERY

## 2022-02-14 PROCEDURE — 250N000009 HC RX 250: Performed by: ORTHOPAEDIC SURGERY

## 2022-02-14 PROCEDURE — 258N000003 HC RX IP 258 OP 636: Performed by: ORTHOPAEDIC SURGERY

## 2022-02-14 PROCEDURE — 97116 GAIT TRAINING THERAPY: CPT | Mod: GP

## 2022-02-14 PROCEDURE — 250N000011 HC RX IP 250 OP 636: Performed by: ANESTHESIOLOGY

## 2022-02-14 PROCEDURE — 86850 RBC ANTIBODY SCREEN: CPT | Performed by: ANESTHESIOLOGY

## 2022-02-14 PROCEDURE — 370N000017 HC ANESTHESIA TECHNICAL FEE, PER MIN: Performed by: ORTHOPAEDIC SURGERY

## 2022-02-14 PROCEDURE — 710N000009 HC RECOVERY PHASE 1, LEVEL 1, PER MIN: Performed by: ORTHOPAEDIC SURGERY

## 2022-02-14 PROCEDURE — 258N000001 HC RX 258: Performed by: ORTHOPAEDIC SURGERY

## 2022-02-14 PROCEDURE — 250N000009 HC RX 250: Performed by: NURSE ANESTHETIST, CERTIFIED REGISTERED

## 2022-02-14 PROCEDURE — 250N000025 HC SEVOFLURANE, PER MIN: Performed by: ORTHOPAEDIC SURGERY

## 2022-02-14 PROCEDURE — 36415 COLL VENOUS BLD VENIPUNCTURE: CPT | Performed by: ANESTHESIOLOGY

## 2022-02-14 DEVICE — IMPLANTABLE DEVICE: Type: IMPLANTABLE DEVICE | Site: HIP | Status: FUNCTIONAL

## 2022-02-14 DEVICE — IMPLANTABLE DEVICE
Type: IMPLANTABLE DEVICE | Site: HIP | Status: FUNCTIONAL
Brand: KINECTIV®

## 2022-02-14 DEVICE — IMPLANTABLE DEVICE
Type: IMPLANTABLE DEVICE | Site: HIP | Status: FUNCTIONAL
Brand: G7® ACETABULAR SYSTEM

## 2022-02-14 DEVICE — IMPLANTABLE DEVICE
Type: IMPLANTABLE DEVICE | Site: HIP | Status: FUNCTIONAL
Brand: G7 ACETABULAR LINER

## 2022-02-14 RX ORDER — POLYETHYLENE GLYCOL 3350 17 G/17G
17 POWDER, FOR SOLUTION ORAL DAILY
Status: DISCONTINUED | OUTPATIENT
Start: 2022-02-15 | End: 2022-02-15 | Stop reason: HOSPADM

## 2022-02-14 RX ORDER — AMOXICILLIN 250 MG
1-2 CAPSULE ORAL 2 TIMES DAILY
Qty: 30 TABLET | Refills: 0 | Status: SHIPPED | OUTPATIENT
Start: 2022-02-14

## 2022-02-14 RX ORDER — FENTANYL CITRATE 0.05 MG/ML
50 INJECTION, SOLUTION INTRAMUSCULAR; INTRAVENOUS EVERY 5 MIN PRN
Status: DISCONTINUED | OUTPATIENT
Start: 2022-02-14 | End: 2022-02-14 | Stop reason: HOSPADM

## 2022-02-14 RX ORDER — NALOXONE HYDROCHLORIDE 0.4 MG/ML
0.4 INJECTION, SOLUTION INTRAMUSCULAR; INTRAVENOUS; SUBCUTANEOUS
Status: DISCONTINUED | OUTPATIENT
Start: 2022-02-14 | End: 2022-02-15 | Stop reason: HOSPADM

## 2022-02-14 RX ORDER — OXYCODONE HYDROCHLORIDE 5 MG/1
5 TABLET ORAL EVERY 4 HOURS PRN
Status: DISCONTINUED | OUTPATIENT
Start: 2022-02-14 | End: 2022-02-15 | Stop reason: HOSPADM

## 2022-02-14 RX ORDER — ACETAMINOPHEN 325 MG/1
650 TABLET ORAL EVERY 4 HOURS PRN
Qty: 100 TABLET | Refills: 0 | Status: SHIPPED | OUTPATIENT
Start: 2022-02-14

## 2022-02-14 RX ORDER — DIPHENHYDRAMINE HCL 12.5MG/5ML
12.5 LIQUID (ML) ORAL EVERY 6 HOURS PRN
Status: DISCONTINUED | OUTPATIENT
Start: 2022-02-14 | End: 2022-02-15 | Stop reason: HOSPADM

## 2022-02-14 RX ORDER — CEFAZOLIN SODIUM 1 G/3ML
1 INJECTION, POWDER, FOR SOLUTION INTRAMUSCULAR; INTRAVENOUS EVERY 8 HOURS
Status: COMPLETED | OUTPATIENT
Start: 2022-02-14 | End: 2022-02-15

## 2022-02-14 RX ORDER — EPHEDRINE SULFATE 50 MG/ML
INJECTION, SOLUTION INTRAMUSCULAR; INTRAVENOUS; SUBCUTANEOUS PRN
Status: DISCONTINUED | OUTPATIENT
Start: 2022-02-14 | End: 2022-02-14

## 2022-02-14 RX ORDER — FENTANYL CITRATE 50 UG/ML
INJECTION, SOLUTION INTRAMUSCULAR; INTRAVENOUS PRN
Status: DISCONTINUED | OUTPATIENT
Start: 2022-02-14 | End: 2022-02-14

## 2022-02-14 RX ORDER — HYDROMORPHONE HCL IN WATER/PF 6 MG/30 ML
0.4 PATIENT CONTROLLED ANALGESIA SYRINGE INTRAVENOUS
Status: DISCONTINUED | OUTPATIENT
Start: 2022-02-14 | End: 2022-02-15 | Stop reason: HOSPADM

## 2022-02-14 RX ORDER — CEFAZOLIN SODIUM/WATER 2 G/20 ML
2 SYRINGE (ML) INTRAVENOUS
Status: COMPLETED | OUTPATIENT
Start: 2022-02-14 | End: 2022-02-14

## 2022-02-14 RX ORDER — BISACODYL 10 MG
10 SUPPOSITORY, RECTAL RECTAL DAILY PRN
Status: DISCONTINUED | OUTPATIENT
Start: 2022-02-14 | End: 2022-02-15 | Stop reason: HOSPADM

## 2022-02-14 RX ORDER — ONDANSETRON 2 MG/ML
4 INJECTION INTRAMUSCULAR; INTRAVENOUS EVERY 30 MIN PRN
Status: DISCONTINUED | OUTPATIENT
Start: 2022-02-14 | End: 2022-02-14 | Stop reason: HOSPADM

## 2022-02-14 RX ORDER — ONDANSETRON 4 MG/1
4 TABLET, ORALLY DISINTEGRATING ORAL EVERY 30 MIN PRN
Status: DISCONTINUED | OUTPATIENT
Start: 2022-02-14 | End: 2022-02-14 | Stop reason: HOSPADM

## 2022-02-14 RX ORDER — HYDROMORPHONE HCL IN WATER/PF 6 MG/30 ML
0.2 PATIENT CONTROLLED ANALGESIA SYRINGE INTRAVENOUS
Status: DISCONTINUED | OUTPATIENT
Start: 2022-02-14 | End: 2022-02-15 | Stop reason: HOSPADM

## 2022-02-14 RX ORDER — ACETAMINOPHEN 650 MG/1
650 SUPPOSITORY RECTAL ONCE
Status: COMPLETED | OUTPATIENT
Start: 2022-02-14 | End: 2022-02-14

## 2022-02-14 RX ORDER — ACETAMINOPHEN 500 MG
1000 TABLET ORAL ONCE
Status: DISCONTINUED | OUTPATIENT
Start: 2022-02-14 | End: 2022-02-14 | Stop reason: HOSPADM

## 2022-02-14 RX ORDER — AMOXICILLIN 250 MG
1 CAPSULE ORAL 2 TIMES DAILY
Status: DISCONTINUED | OUTPATIENT
Start: 2022-02-14 | End: 2022-02-15 | Stop reason: HOSPADM

## 2022-02-14 RX ORDER — VANCOMYCIN HYDROCHLORIDE 1 G/20ML
INJECTION, POWDER, LYOPHILIZED, FOR SOLUTION INTRAVENOUS PRN
Status: DISCONTINUED | OUTPATIENT
Start: 2022-02-14 | End: 2022-02-14 | Stop reason: HOSPADM

## 2022-02-14 RX ORDER — NALOXONE HYDROCHLORIDE 0.4 MG/ML
0.2 INJECTION, SOLUTION INTRAMUSCULAR; INTRAVENOUS; SUBCUTANEOUS
Status: DISCONTINUED | OUTPATIENT
Start: 2022-02-14 | End: 2022-02-15 | Stop reason: HOSPADM

## 2022-02-14 RX ORDER — HYDROMORPHONE HCL IN WATER/PF 6 MG/30 ML
0.4 PATIENT CONTROLLED ANALGESIA SYRINGE INTRAVENOUS EVERY 5 MIN PRN
Status: DISCONTINUED | OUTPATIENT
Start: 2022-02-14 | End: 2022-02-14 | Stop reason: HOSPADM

## 2022-02-14 RX ORDER — SODIUM CHLORIDE, SODIUM LACTATE, POTASSIUM CHLORIDE, CALCIUM CHLORIDE 600; 310; 30; 20 MG/100ML; MG/100ML; MG/100ML; MG/100ML
INJECTION, SOLUTION INTRAVENOUS CONTINUOUS
Status: DISCONTINUED | OUTPATIENT
Start: 2022-02-14 | End: 2022-02-14 | Stop reason: HOSPADM

## 2022-02-14 RX ORDER — OXYCODONE HYDROCHLORIDE 5 MG/1
5 TABLET ORAL EVERY 4 HOURS PRN
Status: CANCELLED | OUTPATIENT
Start: 2022-02-14

## 2022-02-14 RX ORDER — BUSPIRONE HYDROCHLORIDE 10 MG/1
20 TABLET ORAL 2 TIMES DAILY
Status: DISCONTINUED | OUTPATIENT
Start: 2022-02-14 | End: 2022-02-15 | Stop reason: HOSPADM

## 2022-02-14 RX ORDER — ONDANSETRON 4 MG/1
4 TABLET, ORALLY DISINTEGRATING ORAL EVERY 6 HOURS PRN
Status: DISCONTINUED | OUTPATIENT
Start: 2022-02-14 | End: 2022-02-15 | Stop reason: HOSPADM

## 2022-02-14 RX ORDER — CEFAZOLIN SODIUM/WATER 2 G/20 ML
2 SYRINGE (ML) INTRAVENOUS SEE ADMIN INSTRUCTIONS
Status: DISCONTINUED | OUTPATIENT
Start: 2022-02-14 | End: 2022-02-14 | Stop reason: HOSPADM

## 2022-02-14 RX ORDER — NEOSTIGMINE METHYLSULFATE 1 MG/ML
VIAL (ML) INJECTION PRN
Status: DISCONTINUED | OUTPATIENT
Start: 2022-02-14 | End: 2022-02-14

## 2022-02-14 RX ORDER — BUSPIRONE HYDROCHLORIDE 10 MG/1
10 TABLET ORAL
Status: DISCONTINUED | OUTPATIENT
Start: 2022-02-14 | End: 2022-02-15 | Stop reason: HOSPADM

## 2022-02-14 RX ORDER — METHOCARBAMOL 500 MG/1
500 TABLET, FILM COATED ORAL EVERY 6 HOURS PRN
Status: DISCONTINUED | OUTPATIENT
Start: 2022-02-14 | End: 2022-02-15 | Stop reason: HOSPADM

## 2022-02-14 RX ORDER — OXYCODONE HYDROCHLORIDE 5 MG/1
10 TABLET ORAL EVERY 4 HOURS PRN
Status: DISCONTINUED | OUTPATIENT
Start: 2022-02-14 | End: 2022-02-15 | Stop reason: HOSPADM

## 2022-02-14 RX ORDER — HYDROXYZINE HYDROCHLORIDE 10 MG/1
10 TABLET, FILM COATED ORAL EVERY 6 HOURS PRN
Qty: 30 TABLET | Refills: 0 | Status: SHIPPED | OUTPATIENT
Start: 2022-02-14

## 2022-02-14 RX ORDER — ACETAMINOPHEN 325 MG/1
975 TABLET ORAL EVERY 8 HOURS
Status: DISCONTINUED | OUTPATIENT
Start: 2022-02-14 | End: 2022-02-15 | Stop reason: HOSPADM

## 2022-02-14 RX ORDER — PROPOFOL 10 MG/ML
INJECTION, EMULSION INTRAVENOUS PRN
Status: DISCONTINUED | OUTPATIENT
Start: 2022-02-14 | End: 2022-02-14

## 2022-02-14 RX ORDER — TRANEXAMIC ACID 650 MG/1
1950 TABLET ORAL ONCE
Status: COMPLETED | OUTPATIENT
Start: 2022-02-14 | End: 2022-02-14

## 2022-02-14 RX ORDER — ONDANSETRON 2 MG/ML
INJECTION INTRAMUSCULAR; INTRAVENOUS PRN
Status: DISCONTINUED | OUTPATIENT
Start: 2022-02-14 | End: 2022-02-14

## 2022-02-14 RX ORDER — DEXAMETHASONE SODIUM PHOSPHATE 4 MG/ML
INJECTION, SOLUTION INTRA-ARTICULAR; INTRALESIONAL; INTRAMUSCULAR; INTRAVENOUS; SOFT TISSUE PRN
Status: DISCONTINUED | OUTPATIENT
Start: 2022-02-14 | End: 2022-02-14

## 2022-02-14 RX ORDER — OXYCODONE HYDROCHLORIDE 5 MG/1
5-10 TABLET ORAL
Qty: 30 TABLET | Refills: 0 | Status: SHIPPED | OUTPATIENT
Start: 2022-02-14

## 2022-02-14 RX ORDER — ACETAMINOPHEN 325 MG/1
650 TABLET ORAL EVERY 4 HOURS PRN
Status: DISCONTINUED | OUTPATIENT
Start: 2022-02-17 | End: 2022-02-15 | Stop reason: HOSPADM

## 2022-02-14 RX ORDER — GLYCOPYRROLATE 0.2 MG/ML
INJECTION, SOLUTION INTRAMUSCULAR; INTRAVENOUS PRN
Status: DISCONTINUED | OUTPATIENT
Start: 2022-02-14 | End: 2022-02-14

## 2022-02-14 RX ORDER — LIDOCAINE HYDROCHLORIDE 20 MG/ML
INJECTION, SOLUTION INFILTRATION; PERINEURAL PRN
Status: DISCONTINUED | OUTPATIENT
Start: 2022-02-14 | End: 2022-02-14

## 2022-02-14 RX ORDER — ONDANSETRON 2 MG/ML
4 INJECTION INTRAMUSCULAR; INTRAVENOUS EVERY 6 HOURS PRN
Status: DISCONTINUED | OUTPATIENT
Start: 2022-02-14 | End: 2022-02-15 | Stop reason: HOSPADM

## 2022-02-14 RX ORDER — ACETAMINOPHEN 500 MG
1000 TABLET ORAL ONCE
Status: COMPLETED | OUTPATIENT
Start: 2022-02-14 | End: 2022-02-14

## 2022-02-14 RX ORDER — MAGNESIUM HYDROXIDE 1200 MG/15ML
LIQUID ORAL PRN
Status: DISCONTINUED | OUTPATIENT
Start: 2022-02-14 | End: 2022-02-14 | Stop reason: HOSPADM

## 2022-02-14 RX ORDER — ATORVASTATIN CALCIUM 40 MG/1
80 TABLET, FILM COATED ORAL DAILY
Status: DISCONTINUED | OUTPATIENT
Start: 2022-02-15 | End: 2022-02-15 | Stop reason: HOSPADM

## 2022-02-14 RX ORDER — DOXEPIN HYDROCHLORIDE 10 MG/ML
10 SOLUTION ORAL
Status: DISCONTINUED | OUTPATIENT
Start: 2022-02-14 | End: 2022-02-15 | Stop reason: HOSPADM

## 2022-02-14 RX ORDER — LIDOCAINE 40 MG/G
CREAM TOPICAL
Status: DISCONTINUED | OUTPATIENT
Start: 2022-02-14 | End: 2022-02-15 | Stop reason: HOSPADM

## 2022-02-14 RX ORDER — SODIUM CHLORIDE, SODIUM LACTATE, POTASSIUM CHLORIDE, CALCIUM CHLORIDE 600; 310; 30; 20 MG/100ML; MG/100ML; MG/100ML; MG/100ML
INJECTION, SOLUTION INTRAVENOUS CONTINUOUS
Status: DISCONTINUED | OUTPATIENT
Start: 2022-02-14 | End: 2022-02-15 | Stop reason: HOSPADM

## 2022-02-14 RX ORDER — PROCHLORPERAZINE MALEATE 5 MG
5 TABLET ORAL EVERY 6 HOURS PRN
Status: DISCONTINUED | OUTPATIENT
Start: 2022-02-14 | End: 2022-02-15 | Stop reason: HOSPADM

## 2022-02-14 RX ORDER — HYDROXYZINE HYDROCHLORIDE 10 MG/1
10 TABLET, FILM COATED ORAL EVERY 6 HOURS PRN
Status: DISCONTINUED | OUTPATIENT
Start: 2022-02-14 | End: 2022-02-15 | Stop reason: HOSPADM

## 2022-02-14 RX ORDER — PROPOFOL 10 MG/ML
INJECTION, EMULSION INTRAVENOUS CONTINUOUS PRN
Status: DISCONTINUED | OUTPATIENT
Start: 2022-02-14 | End: 2022-02-14

## 2022-02-14 RX ADMIN — TRANEXAMIC ACID 1950 MG: 650 TABLET ORAL at 06:41

## 2022-02-14 RX ADMIN — ACETAMINOPHEN 975 MG: 325 TABLET, FILM COATED ORAL at 14:28

## 2022-02-14 RX ADMIN — SODIUM CHLORIDE, POTASSIUM CHLORIDE, SODIUM LACTATE AND CALCIUM CHLORIDE: 600; 310; 30; 20 INJECTION, SOLUTION INTRAVENOUS at 18:48

## 2022-02-14 RX ADMIN — ONDANSETRON 4 MG: 2 INJECTION INTRAMUSCULAR; INTRAVENOUS at 09:36

## 2022-02-14 RX ADMIN — PHENYLEPHRINE HYDROCHLORIDE 100 MCG: 10 INJECTION INTRAVENOUS at 08:54

## 2022-02-14 RX ADMIN — HYDROMORPHONE HYDROCHLORIDE 0.5 MG: 1 INJECTION, SOLUTION INTRAMUSCULAR; INTRAVENOUS; SUBCUTANEOUS at 08:22

## 2022-02-14 RX ADMIN — ROCURONIUM BROMIDE 20 MG: 50 INJECTION, SOLUTION INTRAVENOUS at 08:11

## 2022-02-14 RX ADMIN — Medication 5 MG: at 08:03

## 2022-02-14 RX ADMIN — FENTANYL CITRATE 100 MCG: 50 INJECTION, SOLUTION INTRAMUSCULAR; INTRAVENOUS at 07:38

## 2022-02-14 RX ADMIN — FENTANYL CITRATE 25 MCG: 50 INJECTION, SOLUTION INTRAMUSCULAR; INTRAVENOUS at 10:11

## 2022-02-14 RX ADMIN — Medication 2 G: at 07:44

## 2022-02-14 RX ADMIN — SODIUM CHLORIDE, POTASSIUM CHLORIDE, SODIUM LACTATE AND CALCIUM CHLORIDE: 600; 310; 30; 20 INJECTION, SOLUTION INTRAVENOUS at 09:25

## 2022-02-14 RX ADMIN — Medication 10 MG: at 08:52

## 2022-02-14 RX ADMIN — SENNOSIDES AND DOCUSATE SODIUM 1 TABLET: 50; 8.6 TABLET ORAL at 21:31

## 2022-02-14 RX ADMIN — OXYCODONE HYDROCHLORIDE 5 MG: 5 TABLET ORAL at 21:31

## 2022-02-14 RX ADMIN — DEXAMETHASONE SODIUM PHOSPHATE 4 MG: 4 INJECTION, SOLUTION INTRA-ARTICULAR; INTRALESIONAL; INTRAMUSCULAR; INTRAVENOUS; SOFT TISSUE at 07:51

## 2022-02-14 RX ADMIN — NEOSTIGMINE METHYLSULFATE 4 MG: 1 INJECTION, SOLUTION INTRAVENOUS at 09:38

## 2022-02-14 RX ADMIN — LIDOCAINE HYDROCHLORIDE 100 MG: 20 INJECTION, SOLUTION INFILTRATION; PERINEURAL at 07:38

## 2022-02-14 RX ADMIN — DOXEPIN HYDROCHLORIDE 10 MG: 10 SOLUTION ORAL at 21:38

## 2022-02-14 RX ADMIN — FENTANYL CITRATE 50 MCG: 50 INJECTION, SOLUTION INTRAMUSCULAR; INTRAVENOUS at 11:39

## 2022-02-14 RX ADMIN — SODIUM CHLORIDE, POTASSIUM CHLORIDE, SODIUM LACTATE AND CALCIUM CHLORIDE: 600; 310; 30; 20 INJECTION, SOLUTION INTRAVENOUS at 14:31

## 2022-02-14 RX ADMIN — FENTANYL CITRATE 50 MCG: 50 INJECTION, SOLUTION INTRAMUSCULAR; INTRAVENOUS at 08:28

## 2022-02-14 RX ADMIN — ASPIRIN 325 MG: 325 TABLET, COATED ORAL at 14:28

## 2022-02-14 RX ADMIN — PHENYLEPHRINE HYDROCHLORIDE 100 MCG: 10 INJECTION INTRAVENOUS at 09:22

## 2022-02-14 RX ADMIN — ACETAMINOPHEN 1000 MG: 500 TABLET ORAL at 06:23

## 2022-02-14 RX ADMIN — MIDAZOLAM 1 MG: 1 INJECTION INTRAMUSCULAR; INTRAVENOUS at 07:32

## 2022-02-14 RX ADMIN — PROPOFOL 130 MG: 10 INJECTION, EMULSION INTRAVENOUS at 07:38

## 2022-02-14 RX ADMIN — BUSPIRONE HYDROCHLORIDE 10 MG: 10 TABLET ORAL at 17:13

## 2022-02-14 RX ADMIN — GLYCOPYRROLATE 0.6 MG: 0.2 INJECTION, SOLUTION INTRAMUSCULAR; INTRAVENOUS at 09:38

## 2022-02-14 RX ADMIN — ROCURONIUM BROMIDE 30 MG: 50 INJECTION, SOLUTION INTRAVENOUS at 08:44

## 2022-02-14 RX ADMIN — BUSPIRONE HYDROCHLORIDE 20 MG: 10 TABLET ORAL at 21:31

## 2022-02-14 RX ADMIN — SODIUM CHLORIDE, POTASSIUM CHLORIDE, SODIUM LACTATE AND CALCIUM CHLORIDE: 600; 310; 30; 20 INJECTION, SOLUTION INTRAVENOUS at 06:39

## 2022-02-14 RX ADMIN — ACETAMINOPHEN 975 MG: 325 TABLET, FILM COATED ORAL at 21:31

## 2022-02-14 RX ADMIN — SENNOSIDES AND DOCUSATE SODIUM 1 TABLET: 50; 8.6 TABLET ORAL at 14:28

## 2022-02-14 RX ADMIN — ROCURONIUM BROMIDE 40 MG: 50 INJECTION, SOLUTION INTRAVENOUS at 07:38

## 2022-02-14 RX ADMIN — PROPOFOL 25 MCG/KG/MIN: 10 INJECTION, EMULSION INTRAVENOUS at 07:47

## 2022-02-14 RX ADMIN — CEFAZOLIN 1 G: 1 INJECTION, POWDER, FOR SOLUTION INTRAMUSCULAR; INTRAVENOUS at 17:14

## 2022-02-14 ASSESSMENT — MIFFLIN-ST. JEOR: SCORE: 1486.05

## 2022-02-14 ASSESSMENT — ENCOUNTER SYMPTOMS
SEIZURES: 0
ORTHOPNEA: 0

## 2022-02-14 NOTE — ANESTHESIA PROCEDURE NOTES
Airway       Patient location during procedure: OR       Procedure Start/Stop Times: 2/14/2022 7:43 AM  Staff -        Anesthesiologist:  Clint Mills MD       CRNA: Bridgett Chery APRN CRNA       Other Anesthesia Staff: Yisel Estrada       Performed By: LADARIUS and with CRNAs       Procedure performed by resident/fellow/CRNA in presence of a teaching physician.    Consent for Airway        Urgency: elective  Indications and Patient Condition       Indications for airway management: danuta-procedural       Induction type:intravenous       Mask difficulty assessment: 2 - vent by mask + OA or adjuvant +/- NMBA    Final Airway Details       Final airway type: endotracheal airway       Successful airway: ETT - single  Endotracheal Airway Details        ETT size (mm): 8.0       Cuffed: yes       Cuff volume (mL): 7       Successful intubation technique: video laryngoscopy       VL Blade Size: Glidescope 4       Grade View of Cords: 1       Adjucts: stylet       Position: Right       Measured from: gums/teeth       Secured at (cm): 23       Bite block used: None    Post intubation assessment        Placement verified by: capnometry, equal breath sounds and chest rise        Number of attempts at approach: 1       Number of other approaches attempted: 0       Secured with: pink tape       Ease of procedure: easy       Dentition: Intact and Unchanged

## 2022-02-14 NOTE — OP NOTE
Procedure Date: 02/14/2022    PREOPERATIVE DIAGNOSIS:  Advanced degenerative arthritis, left hip.    POSTOPERATIVE DIAGNOSIS:  Advanced degenerative arthritis, left hip.    PROCEDURE:  Left direct anterior total hip arthroplasty.    SURGEON:  Clint Moyer MD    FIRST ASSISTANT:  Chetan Galvez PA-C    SECOND ASSISTANT:  Kelly Whitehead ATC.    DESCRIPTION OF PROCEDURE:  The patient was brought to the operating room, given a general anesthetic.  He was placed supine on the radiolucent operating table and his left hip and left lower extremity were then prepped and draped in sterile fashion.  An incision was made over the anterior aspect of the hip.  This was carried down to subcutaneous tissues, down to the fascia.  The fascia was incised longitudinally, and the interval between the tensor fascia huang and sartorius was developed deep.  The rectus muscle was reflected medially, exposing the circumflex vessels, which were cauterized.  The hip capsule was then exposed.  We excised the anterior capsule, exposing the femoral head and neck.  An osteotomy was made in the femoral neck at the level of our preoperatively templated osteotomy level.  Another osteotomy was made just below the femoral head, and the neck and head fragments were then removed.  There were advanced degenerative changes with complete loss of articular cartilage over a widespread area of the weightbearing surface of the hip joint consistent with his preoperative x-rays.  Attention was then turned to the acetabulum.  The acetabulum was carefully reamed to 53 mm.  A 54 mm Biomet G7 cup was then impacted into place.  This had excellent fixation, which was supplemented with 1 screw, which also had excellent fixation.  A standard highly crosslink polyethylene liner to accept a size 40 head was then snapped into the acetabular component.  Attention was then turned to the femur.  The piriformis was released, allowing excellent exposure of the opening of  the femoral canal.  The canal was opened with a starter reamer and then lateralized with a lateralizing reamer, and then we sequentially broached the hip up to a size 13.5 M/L taper broach.  This appeared to fit nicely.  The broach was removed and a real size 13.5 Kinectiv stem was then impacted into the femur. This had excellent fixation.  We then trialed the hip with various neck lengths and offsets, and it appeared that an extended offset -8 neck gave the best fit.  A real extended offset -8 neck along with a size 40, +0 ceramic head were then impacted onto the stem.  The hip was relocated.  The soft tissue tension appeared satisfactory.  The hip was stable through full range of motion, and the leg length and offset appeared to be restored.  The wound was then irrigated with a dilute solution of Betadine, which was allowed to sit within the wound for 3 minutes and was thoroughly irrigated from the wound with a liter of normal saline.  We then sprinkled a gram of vancomycin powder into the hip joint.  The fascia was closed with interrupted 0 Vicryl sutures.  The skin was closed with 2-0 Vicryl subcutaneous sutures and a 3-0 Monocryl running subcuticular suture.  A sterile dressing was then applied to the wound.  The patient was then awakened from anesthesia and transferred to postanesthesia recovery in satisfactory condition.    It should be noted that my assistants were necessary throughout the entire procedure to assist with retraction and positioning.    Clint Moyer MD        D: 2022   T: 2022   MT: SANDYMT    Name:     LUIS EDUARDO TYSarah  MRN:      [id number]        Account:        [id number]   :      1945           Procedure Date: 2022     Document: [id number]

## 2022-02-14 NOTE — OR NURSING
Pt arrived from OR with right arm moving as if conducting in band. Left arm tight to chest trembling. Dr Mills updated. Rounded on pt. Pt able to respond to name and states where he is. Right arm continues to to move.

## 2022-02-14 NOTE — ANESTHESIA CARE TRANSFER NOTE
Patient: Chester Palacios    Procedure: Procedure(s):  LEFT ARTHROPLASTY TOTAL HIP DIRECT ANTERIOR APPROACH       Diagnosis: Osteoarthritis of left hip [M16.12]  Diagnosis Additional Information: No value filed.    Anesthesia Type:   General     Note:    Oropharynx: oropharynx clear of all foreign objects and spontaneously breathing  Level of Consciousness: awake  Oxygen Supplementation: face mask  Level of Supplemental Oxygen (L/min / FiO2): 8  Independent Airway: airway patency satisfactory and stable  Dentition: dentition unchanged  Vital Signs Stable: post-procedure vital signs reviewed and stable  Report to RN Given: handoff report given  Patient transferred to: PACU    Handoff Report: Identifed the Patient, Identified the Reponsible Provider, Reviewed the pertinent medical history, Discussed the surgical course, Reviewed Intra-OP anesthesia mangement and issues during anesthesia, Set expectations for post-procedure period and Allowed opportunity for questions and acknowledgement of understanding      Vitals:  Vitals Value Taken Time   /98 02/14/22 1027   Temp     Pulse 70 02/14/22 1033   Resp 21 02/14/22 1033   SpO2 96 % 02/14/22 1033   Vitals shown include unvalidated device data.    Electronically Signed By: MIL Abdullahi CRNA  February 14, 2022  10:34 AM

## 2022-02-14 NOTE — BRIEF OP NOTE
[facility]    Brief Operative Note    Pre-operative diagnosis: Osteoarthritis of left hip [M16.12]  Post-operative diagnosis Same as pre-operative diagnosis    Procedure: Procedure(s):  LEFT ARTHROPLASTY TOTAL HIP DIRECT ANTERIOR APPROACH  Surgeon: Surgeon(s) and Role:     * Clint Moyer MD - Primary     * Chetan Galvez PA-C - Assisting  Anesthesia: General   Estimated Blood Loss: 300 ml    Drains: None  Specimens:   ID Type Source Tests Collected by Time Destination   A : Bone and Tissue Tissue Hip, Left OR DOCUMENTATION ONLY Clint Moyer MD 2/14/2022  8:21 AM      Findings:   Advanced DJD left hip.  Complications: None.  Implants:   Implant Name Type Inv. Item Serial No.  Lot No. LRB No. Used Action   IMP SHELL BIOM G7 ACETAB PPS TORRES HOLE 54MM SZ F [id number] - [id number] Total Joint Component/Insert IMP SHELL BIOM G7 ACETAB PPS TORRES HOLE 54MM SZ F [id number]  [employer] [id number] Left 1 Implanted   G7 Acetabular Liner 40mm F     [id number] Left 1 Implanted   IMP SCR ZIM 6.5X30MM ACET CUP SELF TAP -192-30 - [id number] Metallic Hardware/[employer] IMP SCR ZIM 6.5X30MM ACET CUP SELF TAP -211-30  [employer] [id number] Left 1 Implanted   IMP STEM ZIM TAPER KINECTIV M/L SZ 13.5 -603-00 - [id number] Total Joint Component/Insert IMP STEM ZIM TAPER KINE[employer]IV M/L SZ 13.5 -707-00  [employer] [id number] Left 1 Implanted   Femoral Head 40     [id number] Left 1 Implanted   IMP FEMORAL NECK ZIM MOD TAPER KINECTIV S -990-00 - [id number] Total Joint Component/Insert IMP FEMORAL NECK ZIM MOD TAPER KINECTIV S -331-00  [employer] [id number] Left 1 Implanted

## 2022-02-14 NOTE — ANESTHESIA POSTPROCEDURE EVALUATION
Patient: Chester Palacios    Procedure: Procedure(s):  LEFT ARTHROPLASTY TOTAL HIP DIRECT ANTERIOR APPROACH       Diagnosis:Osteoarthritis of left hip [M16.12]  Diagnosis Additional Information: No value filed.    Anesthesia Type:  General    Note:     Postop Pain Control: Uneventful            Sign Out: Well controlled pain   PONV: No   Neuro/Psych: Uneventful            Sign Out: Acceptable/Baseline neuro status   Airway/Respiratory: Uneventful            Sign Out: Acceptable/Baseline resp. status   CV/Hemodynamics: Uneventful            Sign Out: Acceptable CV status; No obvious hypovolemia; No obvious fluid overload   Other NRE: NONE   DID A NON-ROUTINE EVENT OCCUR? No           Last vitals:  Vitals Value Taken Time   /89 02/14/22 1200   Temp 36.8  C (98.3  F) 02/14/22 1140   Pulse 72 02/14/22 1200   Resp 20 02/14/22 1200   SpO2 97 % 02/14/22 1200       Electronically Signed By: Clint Mills MD  February 14, 2022  5:11 PM

## 2022-02-14 NOTE — PLAN OF CARE
Pt A&Ox4. VSS on RA, slightly hypertensive. POD0, dressing CDI.  Tolerated reg diet. Not OOB yet. CMS intact. Pain 2/10, declined pain meds. Using ice pack. PIV infusing 100 ml/hr LR. AUOP in urinal. Hypoactive bowel sounds. Plan to discharge tomorrow.

## 2022-02-15 ENCOUNTER — APPOINTMENT (OUTPATIENT)
Dept: OCCUPATIONAL THERAPY | Facility: CLINIC | Age: 77
End: 2022-02-15
Attending: ORTHOPAEDIC SURGERY
Payer: COMMERCIAL

## 2022-02-15 ENCOUNTER — APPOINTMENT (OUTPATIENT)
Dept: PHYSICAL THERAPY | Facility: CLINIC | Age: 77
End: 2022-02-15
Attending: ORTHOPAEDIC SURGERY
Payer: COMMERCIAL

## 2022-02-15 VITALS
RESPIRATION RATE: 16 BRPM | WEIGHT: 165.3 LBS | TEMPERATURE: 97.9 F | DIASTOLIC BLOOD PRESSURE: 73 MMHG | OXYGEN SATURATION: 97 % | HEIGHT: 70 IN | BODY MASS INDEX: 23.66 KG/M2 | SYSTOLIC BLOOD PRESSURE: 141 MMHG | HEART RATE: 59 BPM

## 2022-02-15 LAB
GLUCOSE BLDC GLUCOMTR-MCNC: 136 MG/DL (ref 70–99)
HGB BLD-MCNC: 12.8 G/DL (ref 13.3–17.7)

## 2022-02-15 PROCEDURE — 97165 OT EVAL LOW COMPLEX 30 MIN: CPT | Mod: GO

## 2022-02-15 PROCEDURE — 97535 SELF CARE MNGMENT TRAINING: CPT | Mod: GO

## 2022-02-15 PROCEDURE — 85018 HEMOGLOBIN: CPT | Performed by: ORTHOPAEDIC SURGERY

## 2022-02-15 PROCEDURE — 82962 GLUCOSE BLOOD TEST: CPT

## 2022-02-15 PROCEDURE — 97110 THERAPEUTIC EXERCISES: CPT | Mod: GP

## 2022-02-15 PROCEDURE — 36415 COLL VENOUS BLD VENIPUNCTURE: CPT | Performed by: ORTHOPAEDIC SURGERY

## 2022-02-15 PROCEDURE — 97530 THERAPEUTIC ACTIVITIES: CPT | Mod: GP

## 2022-02-15 PROCEDURE — 250N000011 HC RX IP 250 OP 636: Performed by: ORTHOPAEDIC SURGERY

## 2022-02-15 PROCEDURE — 250N000013 HC RX MED GY IP 250 OP 250 PS 637: Performed by: ORTHOPAEDIC SURGERY

## 2022-02-15 PROCEDURE — 97116 GAIT TRAINING THERAPY: CPT | Mod: GP

## 2022-02-15 RX ADMIN — BUSPIRONE HYDROCHLORIDE 20 MG: 10 TABLET ORAL at 08:05

## 2022-02-15 RX ADMIN — ACETAMINOPHEN 975 MG: 325 TABLET, FILM COATED ORAL at 05:22

## 2022-02-15 RX ADMIN — POLYETHYLENE GLYCOL 3350 17 G: 17 POWDER, FOR SOLUTION ORAL at 08:05

## 2022-02-15 RX ADMIN — CEFAZOLIN 1 G: 1 INJECTION, POWDER, FOR SOLUTION INTRAMUSCULAR; INTRAVENOUS at 00:58

## 2022-02-15 RX ADMIN — OXYCODONE HYDROCHLORIDE 5 MG: 5 TABLET ORAL at 06:27

## 2022-02-15 RX ADMIN — SENNOSIDES AND DOCUSATE SODIUM 1 TABLET: 50; 8.6 TABLET ORAL at 08:05

## 2022-02-15 RX ADMIN — ATORVASTATIN CALCIUM 80 MG: 40 TABLET, FILM COATED ORAL at 08:05

## 2022-02-15 RX ADMIN — ASPIRIN 325 MG: 325 TABLET, COATED ORAL at 08:05

## 2022-02-15 ASSESSMENT — ACTIVITIES OF DAILY LIVING (ADL): IADL_COMMENTS: INDP AT BASELINE

## 2022-02-15 NOTE — PLAN OF CARE
[facility]      OUTPATIENT PHYSICAL THERAPY EVALUATION  PLAN OF TREATMENT FOR OUTPATIENT REHABILITATION  (COMPLETE FOR INITIAL CLAIMS ONLY)  Patient's Last Name, First Name, M.I.  YOB: 1945  Chester Palacios                        Provider's Name  [facility] Medical Record No.  [id number]                               Onset Date:  02/14/22   Start of Care Date:  02/14/22      Type:     _X_PT   ___OT   ___SLP Medical Diagnosis:  Total hip replacement                        PT Diagnosis:  Difficulty with ambulation.   Visits from SOC:  1   _________________________________________________________________________________  Plan of Treatment/Functional Goals    Planned Interventions: bed mobility training,home exercise program,gait training,ROM (range of motion),strengthening,transfer training     Goals: See Physical Therapy Goals on Care Plan in [facility] electronic health record.    Therapy Frequency: Daily  Predicted Duration of Therapy Intervention: 2  _________________________________________________________________________________    I CERTIFY THE NEED FOR THESE SERVICES FURNISHED UNDER        THIS PLAN OF TREATMENT AND WHILE UNDER MY CARE     (Physician co-signature of this document indicates review and certification of the therapy plan).                Certification date from: 02/14/22, Certification date to: 02/15/22    Referring Physician: Clint Moyer MD            Initial Assessment        See Physical Therapy evaluation dated 02/14/22 in Epic electronic health record.

## 2022-02-15 NOTE — PLAN OF CARE
Physical Therapy Discharge Summary    Reason for therapy discharge:    Discharged to home.    Progress towards therapy goal(s). See goals on Care Plan in [facility] electronic health record for goal details.  Goals partially met.  Barriers to achieving goals:   limited tolerance for therapy and discharge from facility.    Therapy recommendation(s):    Continue home exercise program.

## 2022-02-15 NOTE — PLAN OF CARE
Occupational Therapy Discharge Summary    Reason for therapy discharge:    Discharged to home.    Progress towards therapy goal(s). See goals on Care Plan in [facility] electronic health record for goal details.  Goals partially met.  Barriers to achieving goals:   limited tolerance for therapy.    Therapy recommendation(s):    Wife to assist at home with ADL and mobility at min A. Overall moderate tolerance for therapy. Goals adequately met for discharge and all education and training provided. Defer therapy recs to ortho

## 2022-02-15 NOTE — PLAN OF CARE
[id number]    A+Ox4. Ax1 GBW. Up at bedside with urinal. Urinary frequency with 90-100mL every time.  L) hip dressing CDI, denies pain or rates 1/10, barely noticeable.Controlled with scheduled tylenol. IV abx. VSS except htn (baseline) CMS intact. Reg diet. IV saline locked.    Will continue to monitor  Plan- discharge     addentum PRN oxycodone given x1

## 2022-02-15 NOTE — PROGRESS NOTES
02/14/22 1634   Quick Adds   Type of Visit Initial PT Evaluation   Living Environment   People in home spouse   Current Living Arrangements condominium   Home Accessibility no concerns   Transportation Anticipated family or friend will provide   Living Environment Comments Pt has no steps in or within condo, is able to use elevator. Pt has 4WW and walking poles at home. Spouse plans to check what type of walker they own prior to AM session tomorrow.    Self-Care   Usual Activity Tolerance good   Current Activity Tolerance fair   Regular Exercise Yes   Activity/Exercise Type walking   Exercise Amount/Frequency 3-5 times/wk   Equipment Currently Used at Home none   Disability/Function   Fall history within last six months no   General Information   Onset of Illness/Injury or Date of Surgery 02/14/22   Referring Physician Clint Moyer MD   Patient/Family Therapy Goals Statement (PT) return home   Pertinent History of Current Problem (include personal factors and/or comorbidities that impact the POC) Pt is a 77 yo male s/p L HOLLY anterior approach POD #0    Existing Precautions/Restrictions fall   Weight-Bearing Status - LLE weight-bearing as tolerated   Pain Assessment   Patient Currently in Pain Yes, see Vital Sign flowsheet   Integumentary/Edema   Integumentary/Edema other (describe)   Integumentary/Edema Comments Pt has small increase in edema after surgery on this date, but not formally assessed.    Posture    Posture Not impaired   Range of Motion (ROM)   ROM Quick Adds ROM WFL   ROM Comment Not formally assessed, but pt demonstrates adequate ROM durning HOLLY exercises and with functional mobility including transfers and gait.    Strength   Manual Muscle Testing Quick Adds Strength WFL   Bed Mobility   Bed Mobility supine-sit   Supine-Sit [place] (Bed Mobility) minimum assist (75% patient effort)   Bed Mobility Limitations other (see comments)   Impairments Contributing to Impaired Bed Mobility  pain;decreased ROM   Assistive Device (Bed Mobility) bed rails   Comment (Bed Mobility) Pt's primary limitation is pain and fear of movement at this time.    Transfers   Transfers sit-stand transfer   Impairments Contributing to Impaired Transfers pain;decreased ROM;decreased strength   Transfer Safety Comments Pt demonstrates STS transfer from bed with Sherry.   Gait/Stairs (Locomotion)   [place] Level (Gait) minimum assist (75% patient effort)   Assistive Device (Gait) walker, front-wheeled   Distance in Feet (Required for LE Total Joints) 10' x 2   (5' during eval, remainder (5' + 10') for intervention)   Pattern (Gait) step-to   Deviations/Abnormal Patterns (Gait) antalgic;gait speed decreased;stride length decreased   Maintains Weight-bearing Status (Gait) able to maintain   Comment (Gait/Stairs) Pt ambulates with FWW with Sherry and has significant pain during second bout evident by loud breathing while stepping with L LE.    Sensory Examination   Sensory Perception WFL   Clinical Impression   Criteria for Skilled Therapeutic Intervention yes, treatment indicated   PT Diagnosis (PT) Difficulty with ambulation.   Influenced by the following impairments pain, decreased ROM, decreased strength   Functional limitations due to impairments difficulty with functional mobility    Clinical Presentation Stable/Uncomplicated   Clinical Presentation Rationale Per patient's PMH and therapists clinical judgement given pt's current status.    Clinical Decision Making (Complexity) low complexity   Therapy Frequency (PT) Daily   Predicted Duration of Therapy Intervention (days/wks) 2   Planned Therapy Interventions (PT) bed mobility training;home exercise program;gait training;ROM (range of motion);strengthening;transfer training   Anticipated Equipment Needs at Discharge (PT) walker, rolling   Risk & Benefits of therapy have been explained patient   PT Discharge Planning    PT Rationale for DC Rec Pt is below baseline;  however, he has 24/7 support and demonstrates ROM and strength WFL at this time. Pt will benefit from 1 additional PT session in order to practice transfers and ambulation prior to returning home.   PT Brief overview of current status  Sherry with bed mobility, Sherry with STS transfer, Sherry with ambulation.   Total Evaluation Time   Total Evaluation Time (Minutes) 10

## 2022-02-15 NOTE — PROGRESS NOTES
"POD# 1    SUBJECTIVE  Pain well controlled    OBJECTIVE:   BP (!) 147/74 (BP Location: Right arm)   Pulse 69   Temp 97.9  F (36.6  C) (Oral)   Resp 16   Ht 1.778 m (5' 10\")   Wt 75 kg (165 lb 4.8 oz)   SpO2 95%   BMI 23.72 kg/m     Hemoglobin   Date Value Ref Range Status   10/10/2018 16.1 g/dL Final   ]   Wound: dressing dry  Left LE NVI      ASSESSMENT   stable    PLAN   Mobilize in PT  Home later today  Follow up 2 weeks postop in clinic    Clint Moyer MD   "

## 2022-02-15 NOTE — PROGRESS NOTES
POD-0 Left HOLLY  A&O x4.   CMS Intact.   VSS on RA.   Up with Ax1 GB and walker.   Voiding in Urinal or BR.   Pain controlled with Oxycodone, Tylenol.   Dressing CDI.  Continue to monitor.    Patient vital signs are at baseline: Yes  Patient able to ambulate as they were prior to admission or with assist devices provided by therapies during their stay:  Yes  Patient MUST void prior to discharge:  Yes  Patient able to tolerate oral intake:  Yes  Pain has adequate pain control using Oral analgesics:  Yes

## 2022-02-15 NOTE — PROGRESS NOTES
Patient vital signs are at baseline: Yes  Patient able to ambulate as they were prior to admission or with assist devices provided by therapies during their stay:  Yes  Patient MUST void prior to discharge:  Yes  Patient able to tolerate oral intake:  Yes  Pain has adequate pain control using Oral analgesics:  Yes     VSS on RA. IV SL removed. Discharge instructions reviewed, and questions answered appropriately with pt. & pt.'s spouse. Discharge home w/ all the belongings via pvt. ride.

## 2022-02-15 NOTE — PROGRESS NOTES
[facility]      OUTPATIENT OCCUPATIONAL THERAPY  EVALUATION  PLAN OF TREATMENT FOR OUTPATIENT REHABILITATION  (COMPLETE FOR INITIAL CLAIMS ONLY)  Patient's Last Name, First Name, M.I.  YOB: 1945  Chester Palacios                          Provider's Name  [facility] Medical Record No.  [id number]                               Onset Date:  02/14/22   Start of Care Date:  02/15/22     Type:     ___PT   _X_OT   ___SLP Medical Diagnosis:  TKA anterior                         OT Diagnosis:  decreased function in ADL    Visits from SOC:  1   _________________________________________________________________________________  Plan of Treatment/Functional Goals    Planned Interventions: ADL retraining,IADL retraining,bed mobility training,strengthening,home program guidelines,progressive activity/exercise   Goals: See Occupational Therapy Goals on Care Plan in [facility] electronic health record.    Therapy Frequency: 1x eval and treat  Predicted Duration of Therapy Intervention:    _________________________________________________________________________________    I CERTIFY THE NEED FOR THESE SERVICES FURNISHED UNDER        THIS PLAN OF TREATMENT AND WHILE UNDER MY CARE     (Physician co-signature of this document indicates review and certification of the therapy plan).                Certification date from: 02/15/22, Certification date to: 02/15/22    Referring Physician: Clint Moyer MD            Initial Assessment        See Occupational Therapy evaluation dated 02/15/22 in Epic electronic health record.

## 2022-02-15 NOTE — PROGRESS NOTES
02/15/22 1213   Quick Adds   Type of Visit Initial Occupational Therapy Evaluation   Living Environment   People in home spouse   Current Living Arrangements condominium   Home Accessibility no concerns   Transportation Anticipated family or friend will provide   Living Environment Comments tub shower and walk in shower. low toilet   Self-Care   Usual Activity Tolerance good   Current Activity Tolerance fair   Regular Exercise Yes   Activity/Exercise Type walking   Exercise Amount/Frequency 3-5 times/wk   Equipment Currently Used at Home tub bench;raised toilet seat   Activity/Exercise/Self-Care Comment indp at baseline   Instrumental Activities of Daily Living (IADL)   IADL Comments indp at baseline    Disability/Function   Hearing Difficulty or Deaf yes   Patient's preferred means of communication English speaker with hearing loss, no speech problems.   Describe hearing loss bilateral hearing loss   Use of hearing assistive devices right hearing aid;left hearing aid   Fall history within last six months no   General Information   Onset of Illness/Injury or Date of Surgery 02/14/22   Referring Physician Clint Moyer MD   Patient/Family Therapy Goal Statement (OT) pt and wife want to discharge home   Additional Occupational Profile Info/Pertinent History of Current Problem POD 1 anterior HOLLY   Performance Patterns (Routines, Roles, Habits) enjoys guitar and walking    Existing Precautions/Restrictions fall   Left Lower Extremity (Weight-bearing Status) weight-bearing as tolerated (WBAT)   General Observations and Info no precautions per chart    Cognitive Status Examination   Orientation Status orientation to person, place and time   Cognitive Status Comments requires repetition and cues to attend to task. cues for safety though not impulsive   Visual Perception   Visual Impairment/Limitations corrective lenses full-time   Sensory   Sensory Quick Adds No deficits were identified   Pain Assessment    Patient Currently in Pain Yes, see Vital Sign flowsheet   Posture   Posture forward head position;protracted shoulders   Range of Motion Comprehensive   General Range of Motion bilateral upper extremity ROM WFL   Strength Comprehensive (MMT)   Comment, General Manual Muscle Testing (MMT) Assessment defer LE to PT. WFL UE   Coordination   Upper Extremity Coordination No deficits were identified   Activities of Daily Living   BADL Assessment grooming   Grooming Assessment   [place] Level (Grooming) set up   Comment (Grooming) seated EOB   Clinical Impression   Criteria for Skilled Therapeutic Interventions Met (OT) yes;meets criteria;skilled treatment is necessary   OT Diagnosis decreased function in ADL    OT Problem List-Impairments impacting ADL problems related to;activity tolerance impaired;pain;mobility;fear & anxiety;flexibility   Assessment of Occupational Performance 3-5 Performance Deficits   Identified Performance Deficits bathing, dressing, toileting, mobility, self cares   Planned Therapy Interventions (OT) ADL retraining;IADL retraining;bed mobility training;strengthening;home program guidelines;progressive activity/exercise   Clinical Decision Making Complexity (OT) low complexity   Therapy Frequency (OT) 1x eval and treat   Anticipated Equipment Needs Upon Discharge (OT) shower chair;reacher   Risk & Benefits of therapy have been explained evaluation/treatment results reviewed;care plan/treatment goals reviewed;risks/benefits reviewed;current/potential barriers reviewed;participants voiced agreement with care plan;participants included;patient   Comment-Clinical Impression decreased function in ADL warrants skilled IP OT tx   OT Discharge Planning    OT Rationale for DC Rec anticipate that pt will be able able to return to home with A for ADL and mobility from wife. good potential for increasing function for home safety    Therapy Certification   Start of Care Date 02/15/22   Certification date  from 02/15/22   Certification date to 02/15/22   Medical Diagnosis TKA anterior    Total Evaluation Time (Minutes)   Total Evaluation Time (Minutes) 10

## 2022-02-15 NOTE — DISCHARGE SUMMARY
[facility] Discharge Summary    Chester Palacios MRN# [id number]   Age: 76 year old YOB: 1945     Date of Admission:  2/14/2022  Date of Discharge::  2/15/2022  Admitting Physician:  Clint Moyer MD  Discharge Physician:  Clint Moyer MD     Home clinic: [facility], Jax Hermosillo MD          Admission Diagnoses:   Osteoarthritis          Discharge Diagnosis:   Arthoplasty of the left hip          Procedures:   Procedure(s): Total hip arthoplasty (Left)       No other procedures performed during this admission           Medications Prior to Admission:     Medications Prior to Admission   Medication Sig Dispense Refill Last Dose     amoxicillin (AMOXIL) 500 MG capsule TAKE 2000 MG 60 MIN PRIOR TO DENTAL WORK 8 capsule 11  at PRN     atorvastatin (LIPITOR) 80 MG tablet Take 80 mg by mouth daily    at AM     busPIRone (BUSPAR) 10 MG tablet Take 10 mg by mouth daily (with lunch) TAKES IN ADDITION TO AM/PM DOSE FOR TOTAL 50MG DAILY.    at 1200     busPIRone (BUSPAR) 10 MG tablet Take 20 mg by mouth 2 times daily TAKES IN ADDITION TO MIDDAY DOSE FOR TOTAL 50MG DAILY.  (10MG X 2 = 20MG)    at AM     carbamide peroxide (DEBROX) 6.5 % otic solution Place 10 drops into both ears daily as needed for other    at PRN     diclofenac (VOLTAREN) 1 % GEL Place onto the skin as needed for moderate pain Patient uses prn on hands for playing guitar. 100 g 3  at PRN     doxepin (SINEQUAN) 10 MG/ML (HIGH CONC) solution Take 10 mg by mouth nightly as needed     at PM     [DISCONTINUED] aspirin 81 MG tablet Take 81 mg by mouth every morning     at AM             Discharge Medications:     Current Discharge Medication List      START taking these medications    Details   acetaminophen (TYLENOL) 325 MG tablet Take 2 tablets (650 mg) by mouth every 4 hours as needed for other (mild pain)  Qty: 100 tablet, Refills: 0    Associated Diagnoses: Status post total replacement of left hip       aspirin (ASA) 325 MG EC tablet Take 1 tablet (325 mg) by mouth daily Take one adult strength aspirin (325 mg) daily for 4 weeks to help prevent blood clots, and then resume your preoperative dose of one baby aspirin (81 mg) daily.  Qty: 30 tablet, Refills: 0    Associated Diagnoses: Status post total replacement of left hip      hydrOXYzine (ATARAX) 10 MG tablet Take 1 tablet (10 mg) by mouth every 6 hours as needed for itching or anxiety (with pain, moderate pain)  Qty: 30 tablet, Refills: 0    Associated Diagnoses: Status post total replacement of left hip      oxyCODONE (ROXICODONE) 5 MG tablet Take 1-2 tablets (5-10 mg) by mouth every 3 hours as needed for pain (Moderate to Severe)  Qty: 30 tablet, Refills: 0    Associated Diagnoses: Status post total replacement of left hip      senna-docusate (SENOKOT-S/PERICOLACE) 8.6-50 MG tablet Take 1-2 tablets by mouth 2 times daily Take while on oral narcotics to prevent or treat constipation.  Qty: 30 tablet, Refills: 0    Comments: While taking narcotics  Associated Diagnoses: Status post total replacement of left hip         CONTINUE these medications which have NOT CHANGED    Details   amoxicillin (AMOXIL) 500 MG capsule TAKE 2000 MG 60 MIN PRIOR TO DENTAL WORK  Qty: 8 capsule, Refills: 11    Associated Diagnoses: Preventive antibiotic      atorvastatin (LIPITOR) 80 MG tablet Take 80 mg by mouth daily      !! busPIRone (BUSPAR) 10 MG tablet Take 10 mg by mouth daily (with lunch) TAKES IN ADDITION TO AM/PM DOSE FOR TOTAL 50MG DAILY.      !! busPIRone (BUSPAR) 10 MG tablet Take 20 mg by mouth 2 times daily TAKES IN ADDITION TO MIDDAY DOSE FOR TOTAL 50MG DAILY.  (10MG X 2 = 20MG)      carbamide peroxide (DEBROX) 6.5 % otic solution Place 10 drops into both ears daily as needed for other      diclofenac (VOLTAREN) 1 % GEL Place onto the skin as needed for moderate pain Patient uses prn on hands for playing guitar.  Qty: 100 g, Refills: 3    Associated Diagnoses:  Arthritis of both hands      doxepin (SINEQUAN) 10 MG/ML (HIGH CONC) solution Take 10 mg by mouth nightly as needed        !! - Potential duplicate medications found. Please discuss with provider.      STOP taking these medications       aspirin 81 MG tablet Comments:   Reason for Stopping:                     Consultations:   No consultations were requested during this admission          Brief History of Illness:   This patient was a 76 year old male with a known history of osteoarthritis.  He underwent a period of non-operative treatment which only provided mild and intermittent relief.  After a lengthy discussion and consultation with Dr. Clint Moyer, the patient chose to undergo a left side hip arthroplasty.  He was explained the risks,complications, and benefits of the procedure and elected to have the surgical procedure.  Patient was cleared by his primary care physician for joint replacement.           Hospital Course:   The patient tolerated the procedure well and was taken to postop recovery in stable condition.  Please refer to the full operative note for complete details.   In recovery, his post-operative films show components in excellent position.     On post-operative day 1, patient's wound was checked and noted to be healing well.    Patient had adequate pain control and was prescribed physical therapy.  He was given 24 hrs of perioperative antibiotics.  Patient had motor strength of 5/5 on the left side.  Patient was neurovascularly intact in the left side lower extremity. There were no complications throughout the hospital course as the patient passed physical therapy/occupational therapy on post-operative day #1.      Upon discharge, the patient was seen by Dr. Clint Moyer and all questions were answered.  He was discharged on home medications as outlined in medication reconciliation list outlined below.  The patient has instructions that if he has increased pain, fever, erythema,  swelling or drainage to immediately call.          Discharge Instructions and Follow-Up:   Discharge diet: Regular   Discharge activity: Activity as tolerated   Discharge follow-up: Follow up with Dr. Clint Moyer in 10-14 days   Wound care: Keep Aquacel dressing in place. Okay to shower.           Discharge Disposition:   Discharged to home      Attestation:  I have reviewed today's vital signs, notes, medications, labs and imaging.    Clint Moyer MD

## 2022-03-01 ENCOUNTER — TRANSFERRED RECORDS (OUTPATIENT)
Dept: HEALTH INFORMATION MANAGEMENT | Facility: CLINIC | Age: 77
End: 2022-03-01
Payer: COMMERCIAL

## 2022-03-31 ENCOUNTER — TRANSFERRED RECORDS (OUTPATIENT)
Dept: HEALTH INFORMATION MANAGEMENT | Facility: CLINIC | Age: 77
End: 2022-03-31
Payer: COMMERCIAL

## 2022-04-07 ENCOUNTER — THERAPY VISIT (OUTPATIENT)
Dept: PHYSICAL THERAPY | Facility: CLINIC | Age: 77
End: 2022-04-07
Payer: COMMERCIAL

## 2022-04-07 DIAGNOSIS — M25.552 HIP PAIN, LEFT: ICD-10-CM

## 2022-04-07 PROCEDURE — 97110 THERAPEUTIC EXERCISES: CPT | Mod: GP | Performed by: PHYSICAL THERAPIST

## 2022-04-07 ASSESSMENT — ACTIVITIES OF DAILY LIVING (ADL)
HOS_ADL_ITEM_SCORE_TOTAL: 51
HOS_ADL_SCORE(%): 79.69
DEEP_SQUATTING: SLIGHT DIFFICULTY
HEAVY_WORK: MODERATE DIFFICULTY
ROLLING_OVER_IN_BED: SLIGHT DIFFICULTY
WALKING_INITIALLY: SLIGHT DIFFICULTY
STEPPING_UP_AND_DOWN_CURBS: NO DIFFICULTY AT ALL
TWISTING/PIVOTING_ON_INVOLVED_LEG: SLIGHT DIFFICULTY
WALKING_DOWN_STEEP_HILLS: NO DIFFICULTY AT ALL
STANDING_FOR_15_MINUTES: NO DIFFICULTY AT ALL
WALKING_APPROXIMATELY_10_MINUTES: SLIGHT DIFFICULTY
WALKING_UP_STEEP_HILLS: NO DIFFICULTY AT ALL
PUTTING_ON_SOCKS_AND_SHOES: EXTREME DIFFICULTY
WALKING_15_MINUTES_OR_GREATER: MODERATE DIFFICULTY
HOW_WOULD_YOU_RATE_YOUR_CURRENT_LEVEL_OF_FUNCTION_DURING_YOUR_USUAL_ACTIVITIES_OF_DAILY_LIVING_FROM_0_TO_100_WITH_100_BEING_YOUR_LEVEL_OF_FUNCTION_PRIOR_TO_YOUR_HIP_PROBLEM_AND_0_BEING_THE_INABILITY_TO_PERFORM_ANY_OF_YOUR_USUAL_DAILY_ACTIVITIES?: 50
GETTING_INTO_AND_OUT_OF_AN_AVERAGE_CAR: SLIGHT DIFFICULTY
HOS_ADL_COUNT: 16
RECREATIONAL_ACTIVITIES: MODERATE DIFFICULTY
SITTING_FOR_15_MINUTES: NO DIFFICULTY AT ALL
LIGHT_TO_MODERATE_WORK: SLIGHT DIFFICULTY
GOING_DOWN_1_FLIGHT_OF_STAIRS: NO DIFFICULTY AT ALL
HOS_ADL_HIGHEST_POTENTIAL_SCORE: 64
GOING_UP_1_FLIGHT_OF_STAIRS: NO DIFFICULTY AT ALL

## 2022-04-07 NOTE — PROGRESS NOTES
[facility]    OUTPATIENT Physical Therapy ORTHOPEDIC EVALUATION  PLAN OF TREATMENT FOR OUTPATIENT REHABILITATION  (COMPLETE FOR INITIAL CLAIMS ONLY)  Patient's Last Name, First Name, M.I.  YOB: 1945  Chester Palacios    Provider s Name:  [facility]   Medical Record No.  [id number]   Start of Care Date:  04/07/22   Onset Date:  02/14/22    Type:     _X__PT   ___OT Medical Diagnosis:    Encounter Diagnosis   Name Primary?     Hip pain, left         Treatment Diagnosis:           Goals:     04/07/22 0500   Body Part   Goals listed below are for left hip   Goal #1   Goal #1 ambulation   Previous Functional Level Minutes patient could walk   Performance Level 10 with left hip pain   Current Functional Level [place] patient can walk   Performance Level 1 without hip pain   STG Target Performance [place] patient will be able to  walk   Performance Level 2 without hip pain   Rationale for safe community ambulation;to promote a healthy and active lifestyle   Due Date 05/07/22    LTG Target Performance [place] patient will be able to  walk   Performance Level 3 without hip pain   Rationale for safe community ambulation;to promote a healthy and active lifestyle   Date Goal Met 06/07/22         Therapy Frequency:  twice a month  Predicted Duration of Therapy Intervention:  2 months    Meri Bond, PT                 I CERTIFY THE NEED FOR THESE SERVICES FURNISHED UNDER        THIS PLAN OF TREATMENT AND WHILE UNDER MY CARE .             Physician Signature               Date    X_____________________________________________________                             Certification Date From:  04/07/22   Certification Date To:  07/05/22    Referring Provider:  Clint Moyer    Initial Assessment        See Epic Evaluation SOC Date: 04/07/22                                                                  Answers for HPI/ROS submitted by the patient on 4/1/2022  Reason for Visit:: My left leg needs strenghthening after left hip surgery  When problem began:: 2/14/2022  How problem occurred:: I had a complete left hip replacement  Number scale: 1/10  General health as reported by patient: good  Please check all that apply to your current or past medical history: calf pain-swelling-warmth, weakness  Medical allergies: none  Surgeries: orthopedic surgery  Medications you are currently taking: anti-depressants, sleep medication, other  Other Meds Detail: constipation  Occupation:: retired  What are your primary job tasks: computer work, driving, lifting/carrying, prolonged sitting, other  Other Tasks Detail: home chores: dishwashing, repairs

## 2022-04-07 NOTE — PROGRESS NOTES
Physical Therapy Initial Evaluation  Subjective:  The history is provided by the patient. No  was used.   Patient Health History  Chester Palacios being seen for My left leg needs strenghthening after left hip surgery.     Problem began: 2/14/2022.   Problem occurred: I had a complete left hip replacement   Pain is reported as 1/10 on pain scale.  General health as reported by patient is good.  Pertinent medical history includes: calf pain-swelling-warmth and weakness.     Medical allergies: none.   Surgeries include:  Orthopedic surgery.    Current medications:  Anti-depressants, sleep medication and other. Other medications details: constipation.    Current occupation is retired.   Primary job tasks include:  Computer work, driving, lifting/carrying, prolonged sitting and other.   Other job/home tasks details: home chores: dishwashing, repairs.                Therapist Generated HPI Evaluation  Problem details: Had HOLLY on the left on 2/14/2022.  C/o swelling in foot and lower leg.  Has walked up to a mile.  Sleeping well.  .         Type of problem:  Left hip.    This is a new condition.  Condition occurred with:  Other reason.                                           Objective:    Gait:  Slightly forward flexed posture                                                     Hip Evaluation  HIP AROM:    Flexion: Left: 95   Right:                     Hip Strength:    Flexion:   Left: 4/5   Pain:  Right: 5-/5   Pain:                                        Hip Special Testing:   Not Assessed        Hip Palpation:  Palpations normal left hip: sore over incision.      Functional Testing:  Functional test hip: able to step up ontol 6 inch step using a railing and down onto right leg without left hip pain.                           General Evaluation:                Edema:  Edema wnl general: moderate edema left lower leg and foot, discussed reaching out to MD about this                                                       ROS    Assessment/Plan:    Patient is a 76 year old male with left side hip complaints.    Patient has the following significant findings with corresponding treatment plan.                Diagnosis 1:  Left hip HOLLY  Pain -  self management, education Decreased ROM/flexibility - manual therapy, therapeutic exercise and home program  Decreased strength - therapeutic exercise, therapeutic activities and home program  Edema - cold therapy and self management/home program  Decreased function - therapeutic activities and home program    Therapy Evaluation Codes:   1) History comprised of:   Personal factors that impact the plan of care:      None.    Comorbidity factors that impact the plan of care are:      None.     Medications impacting care: None.  2) Examination of Body Systems comprised of:   Body structures and functions that impact the plan of care:      Hip.   Activity limitations that impact the plan of care are:      Bending, Dressing, Sports, Squatting/kneeling, Stairs and Walking.  3) Clinical presentation characteristics are:   Stable/Uncomplicated.  4) Decision-Making    Low complexity using standardized patient assessment instrument and/or measureable assessment of functional outcome.  Cumulative Therapy Evaluation is: Low complexity.    Previous and current functional limitations:  (See Goal Flow Sheet for this information)    Short term and Long term goals: (See Goal Flow Sheet for this information)     Communication ability:  Patient appears to be able to clearly communicate and understand verbal and written communication and follow directions correctly.  Treatment Explanation - The following has been discussed with the patient:   RX ordered/plan of care  Anticipated outcomes  Possible risks and side effects  This patient would benefit from PT intervention to resume normal activities.   Rehab potential is excellent.    Frequency:  2 X a month, once daily  Duration:  for 2  months  Discharge Plan:  Achieve all LTG.  Independent in home treatment program.  Reach maximal therapeutic benefit.    Please refer to the daily flowsheet for treatment today, total treatment time and time spent performing 1:1 timed codes.

## 2022-04-08 ENCOUNTER — TRANSFERRED RECORDS (OUTPATIENT)
Dept: HEALTH INFORMATION MANAGEMENT | Facility: CLINIC | Age: 77
End: 2022-04-08
Payer: COMMERCIAL

## 2022-04-29 ENCOUNTER — THERAPY VISIT (OUTPATIENT)
Dept: PHYSICAL THERAPY | Facility: CLINIC | Age: 77
End: 2022-04-29
Payer: COMMERCIAL

## 2022-04-29 DIAGNOSIS — M25.552 HIP PAIN, LEFT: Primary | ICD-10-CM

## 2022-04-29 PROCEDURE — 97110 THERAPEUTIC EXERCISES: CPT | Mod: GP | Performed by: PHYSICAL THERAPIST

## 2022-04-29 PROCEDURE — 97112 NEUROMUSCULAR REEDUCATION: CPT | Mod: GP | Performed by: PHYSICAL THERAPIST

## 2022-05-12 ENCOUNTER — TRANSFERRED RECORDS (OUTPATIENT)
Dept: HEALTH INFORMATION MANAGEMENT | Facility: CLINIC | Age: 77
End: 2022-05-12
Payer: COMMERCIAL

## 2022-05-18 ENCOUNTER — THERAPY VISIT (OUTPATIENT)
Dept: PHYSICAL THERAPY | Facility: CLINIC | Age: 77
End: 2022-05-18
Payer: COMMERCIAL

## 2022-05-18 DIAGNOSIS — M25.552 HIP PAIN, LEFT: Primary | ICD-10-CM

## 2022-05-18 PROCEDURE — 97112 NEUROMUSCULAR REEDUCATION: CPT | Mod: GP | Performed by: PHYSICAL THERAPIST

## 2022-05-18 PROCEDURE — 97110 THERAPEUTIC EXERCISES: CPT | Mod: GP | Performed by: PHYSICAL THERAPIST

## 2022-05-18 ASSESSMENT — ACTIVITIES OF DAILY LIVING (ADL)
PUTTING_ON_SOCKS_AND_SHOES: SLIGHT DIFFICULTY
RECREATIONAL_ACTIVITIES: SLIGHT DIFFICULTY
WALKING_15_MINUTES_OR_GREATER: NO DIFFICULTY AT ALL
WALKING_APPROXIMATELY_10_MINUTES: NO DIFFICULTY AT ALL
TWISTING/PIVOTING_ON_INVOLVED_LEG: NO DIFFICULTY AT ALL
LIGHT_TO_MODERATE_WORK: NO DIFFICULTY AT ALL
HEAVY_WORK: SLIGHT DIFFICULTY
HOS_ADL_SCORE(%): 93.75
HOS_ADL_ITEM_SCORE_TOTAL: 60
DEEP_SQUATTING: SLIGHT DIFFICULTY
GOING_UP_1_FLIGHT_OF_STAIRS: NO DIFFICULTY AT ALL
SITTING_FOR_15_MINUTES: NO DIFFICULTY AT ALL
STEPPING_UP_AND_DOWN_CURBS: NO DIFFICULTY AT ALL
ROLLING_OVER_IN_BED: NO DIFFICULTY AT ALL
WALKING_UP_STEEP_HILLS: NO DIFFICULTY AT ALL
GETTING_INTO_AND_OUT_OF_AN_AVERAGE_CAR: SLIGHT DIFFICULTY
STANDING_FOR_15_MINUTES: NO DIFFICULTY AT ALL
WALKING_INITIALLY: NO DIFFICULTY AT ALL
HOS_ADL_HIGHEST_POTENTIAL_SCORE: 64
HOS_ADL_COUNT: 16
HOW_WOULD_YOU_RATE_YOUR_CURRENT_LEVEL_OF_FUNCTION_DURING_YOUR_USUAL_ACTIVITIES_OF_DAILY_LIVING_FROM_0_TO_100_WITH_100_BEING_YOUR_LEVEL_OF_FUNCTION_PRIOR_TO_YOUR_HIP_PROBLEM_AND_0_BEING_THE_INABILITY_TO_PERFORM_ANY_OF_YOUR_USUAL_DAILY_ACTIVITIES?: 90
GOING_DOWN_1_FLIGHT_OF_STAIRS: NO DIFFICULTY AT ALL
WALKING_DOWN_STEEP_HILLS: NO DIFFICULTY AT ALL

## 2022-05-18 NOTE — PROGRESS NOTES
Subjective:  HPI  Physical Exam                    Objective:  System    Physical Exam    General     ROS    Assessment/Plan:    DISCHARGE REPORT    Progress reporting period is from 4/7/2022 to 5/18/2022.       SUBJECTIVE  Subjective changes noted by patient:  .  Subjective: Walking 2-3 miles and back to playing tennis.  Doing everything.  Rode the bike at 70 rpm's for 15 min. No pain.  Has tried swimming as well.    Current pain level is 0/10  .     Previous pain level was   Initial Pain level: 0/10.   Changes in function:  Yes (See Goal flowsheet attached for changes in current functional level)  Adverse reaction to treatment or activity: None    OBJECTIVE  Changes noted in objective findings:  Yes,   Objective: No limping with walking.  No issues with stairs.  MMT:  4/5 hip flexion and hip abduction     ASSESSMENT/PLAN  Updated problem list and treatment plan: Diagnosis 1:  Left HOLLY  Decreased ROM/flexibility - manual therapy, therapeutic exercise and home program  Decreased strength - therapeutic exercise, therapeutic activities and home program  Impaired gait - gait training and home program  Decreased function - therapeutic activities and home program  STG/LTGs have been met or progress has been made towards goals:  Yes (See Goal flow sheet completed today.)  Assessment of Progress: The patient's condition is improving.  The patient's condition has potential to improve.  Self Management Plans:  Patient has been instructed in a home treatment program.    Chester continues to require the following intervention to meet STG and LTG's:  Patient needs to continue to work on the home exercise program.      Recommendations:  This patient is ready to be discharged from therapy and continue their home treatment program.    Please refer to the daily flowsheet for treatment today, total treatment time and time spent performing 1:1 timed codes.

## 2022-06-19 ENCOUNTER — TRANSFERRED RECORDS (OUTPATIENT)
Dept: INTERNAL MEDICINE | Facility: CLINIC | Age: 77
End: 2022-06-19

## 2022-06-28 ENCOUNTER — TRANSFERRED RECORDS (OUTPATIENT)
Dept: HEALTH INFORMATION MANAGEMENT | Facility: CLINIC | Age: 77
End: 2022-06-28

## 2022-07-26 ENCOUNTER — TRANSFERRED RECORDS (OUTPATIENT)
Dept: GENERAL RADIOLOGY | Facility: CLINIC | Age: 77
End: 2022-07-26

## 2022-10-22 ENCOUNTER — HEALTH MAINTENANCE LETTER (OUTPATIENT)
Age: 77
End: 2022-10-22

## 2023-04-01 ENCOUNTER — HEALTH MAINTENANCE LETTER (OUTPATIENT)
Age: 78
End: 2023-04-01

## 2023-04-12 DIAGNOSIS — Z79.2 PREVENTIVE ANTIBIOTIC: ICD-10-CM

## 2023-04-12 RX ORDER — AMOXICILLIN 500 MG/1
CAPSULE ORAL
Qty: 8 CAPSULE | Refills: 11 | Status: CANCELLED | OUTPATIENT
Start: 2023-04-12

## 2023-10-25 ENCOUNTER — THERAPY VISIT (OUTPATIENT)
Dept: PHYSICAL THERAPY | Facility: CLINIC | Age: 78
End: 2023-10-25
Payer: COMMERCIAL

## 2023-10-25 ENCOUNTER — TRANSCRIBE ORDERS (OUTPATIENT)
Dept: OTHER | Age: 78
End: 2023-10-25

## 2023-10-25 ENCOUNTER — TRANSFERRED RECORDS (OUTPATIENT)
Dept: HEALTH INFORMATION MANAGEMENT | Facility: CLINIC | Age: 78
End: 2023-10-25

## 2023-10-25 DIAGNOSIS — M54.42 BILATERAL LOW BACK PAIN WITH LEFT-SIDED SCIATICA: Primary | ICD-10-CM

## 2023-10-25 DIAGNOSIS — M47.816 DEGENERATIVE JOINT DISEASE (DJD) OF LUMBAR SPINE: Primary | ICD-10-CM

## 2023-10-25 PROCEDURE — 97110 THERAPEUTIC EXERCISES: CPT | Mod: GP | Performed by: PHYSICAL THERAPIST

## 2023-10-25 PROCEDURE — 97161 PT EVAL LOW COMPLEX 20 MIN: CPT | Mod: GP | Performed by: PHYSICAL THERAPIST

## 2023-10-25 NOTE — PROGRESS NOTES
CC:Diagnoses of Vitamin D deficiency disease, Thrombocytopenia (HCC), Chronic neutropenia (HCC), Current moderate episode of major depressive disorder without prior episode (HCC), and Need for vaccination were pertinent to this visit.    HISTORY OF PRESENT ILLNESS: Patient is a 73 y.o. female established patient who presents today to about her chronic health problems and the labs she did prior to the visit.      Vitamin D deficiency disease  This is a chronic health problem that is well controlled with current medications and lifestyle measures.  Her vitamin D level is the upper limits of normal at 79.  She is only on 2000 units daily.  I am going to have her switch to 2000 units 3 times per week and we will plan to recheck this in the spring when they return from [place].    Thrombocytopenia (HCC)  This is a chronic health problem for this patient but she is always been able to keep her platelets above 100.  Her most recent labs show her platelets are 107.  She does not have any unusual bleeding problems or unusual bruising.  We will continue to keep an eye on this every 6 months    Chronic neutropenia (CMS-HCC)  This is a chronic problem for this patient that continues where she has a mildly low white count.  It is 4.3 this time with her absolute neutrophils and absolute lymphocytes being in the normal range.  We will continue to monitor.    Current moderate episode of major depressive disorder without prior episode (HCC)  This is a chronic health problem that is well controlled with current medications and lifestyle measures.  Mood is doing well, she continues on duloxetine 20 mg daily.  She has had the added benefit that that helps with the diarrhea she has been having after gallbladder surgery so she is no longer having to take the cholestyramine but she does keep it on hand if needed.      Patient Active Problem List    Diagnosis Date Noted   • Degenerative disc disease, cervical 05/20/2019   • Current moderate  PHYSICAL THERAPY EVALUATION  Type of Visit: Evaluation    See electronic medical record for Abuse and Falls Screening details.    Subjective       Presenting condition or subjective complaint: back pain, left gluteal pain to thigh with walking over the last two months.  Is playing tennis, bikes and walks  Date of onset: 10/25/23 (date that patient saw MD)    Relevant medical history: Osteoarthritis   Past Medical History:   Diagnosis Date    Anxiety state 10/26/2012    Works with psychiatrist     Arthritis of right hip 12/21/2015    Complication of anesthesia     pt states yes, but unaware of complicaiton    Coronary artery calcification seen on computed tomography 12/12/2016    See Cardiology consult 1/17; cardiac MRI shows EF 60%, no scar      Family history of hemochromatosis 10/25/2015    Brother; Chester had nml labs in 2010     Hyperlipidemia     Hyperlipidemia with target LDL less than 130 10/26/2012     Diagnosis updated by automated process. Provider to review and confirm.    Insomnia     Lower urinary tract symptoms (LUTS) 11/5/2016    See urology consult 10/16      Mumps     ~1955    Osteoarthritis, unspecified osteoarthritis type, unspecified site 11/7/2016       Dates & types of surgery: HOLLY unsure of date  Past Surgical History:   Procedure Laterality Date    ARTHROPLASTY HIP ANTERIOR Left 2/14/2022    Procedure: LEFT ARTHROPLASTY TOTAL HIP DIRECT ANTERIOR APPROACH;  Surgeon: Clint Moyer MD;  Location:  OR    CYSTOSCOPY      CYSTOSCOPY, TRANSURETHRAL RESECTION (TUR) PROSTATE, COMBINED N/A 3/8/2017    Procedure: COMBINED CYSTOSCOPY, TRANSURETHRAL RESECTION (TUR) PROSTATE;  Surgeon: Rosa Macias MD;  Location: [facility] OR    HERNIA REPAIR  1985    Inguinal    JOINT REPLACEMENT, HIP RT/LT Right 1/16    Joint Replacement Hip RT/LT    ORTHOPEDIC SURGERY      R elbow Orif    PROSTATE SURGERY  2007    TUNA for BPH    TONSILLECTOMY      age 5         Prior diagnostic imaging/testing results:  X-ray (stenosis)     Prior therapy history for the same diagnosis, illness or injury: Yes 2021?    Prior Level of Function  Transfers:   Ambulation:   ADL:   IADL:     Living Environment  Social support: With a significant other or spouse   Type of home: Apartment/condo   Stairs to enter the home: No       Ramp:     Stairs inside the home: No       Help at home:    Equipment owned:       Employment: Not Applicable    Hobbies/Interests: tennis, walking, biking    Patient goals for therapy: walking    Pain assessment:      Objective   LUMBAR SPINE EVALUATION  PAIN: Pain Location: lumbar spine and hip  Pain Frequency: intermittent  Pain is Exacerbated By: walking  INTEGUMENTARY (edema, incisions):   POSTURE: Standing Posture: flexed trunk  GAIT:   Weightbearing Status: WBAT  Assistive Device(s):   Gait Deviations: Antalgic  Base of support increased  Trunk flexion  Increased pelvic rotation , decreased hip extension  BALANCE/PROPRIOCEPTION: Single Leg Stance Eyes Open (seconds): 10 sec on the left  WEIGHTBEARING ALIGNMENT: Lumbar/Pelvic WB Alignment:Iliac crest high L  NON-WEIGHTBEARING ALIGNMENT:    ROM:   (Degrees) Left AROM Left PROM  Right AROM Right PROM   Hip Flexion  Groin pain  No pain   Hip Extension       Hip Abduction       Hip Adduction Some pain      Hip Internal Rotation  Very limited     Hip External Rotation  Very limited     Knee Flexion       Knee Extension       Lumbar Side glide     Lumbar Flexion Hands to shins   Lumbar Extension No pain   Pain:   End feel:   PELVIC/SI SCREEN:   STRENGTH:     MYOTOMES:    Left Right   T12-L3 (Hip Flexion) 4- 5-   L2-4 (Quads)  4 5   L4 (Ankle DF) 5 5   L5 (Great Toe Ext) 5 5   S1 (Toe Raise) 5 5     DTR S:   CORD SIGNS:   DERMATOMES:   NEURAL TENSION:    Left Right   SLR     SLR with DF     Femoral Nerve     Slump Buttock pain    Dmitry (Lumbar)     Dmitry (Thoracic)     Dmitry (Cervical)     Median     Ulnar     Radial        FLEXIBILITY:  decreased hip flexor  episode of major depressive disorder without prior episode ([facility]) 2019   • Multinodular goiter (nontoxic) 2019   • Thrombocytopenia ([facility]) 10/18/2018   • Seasonal allergies 2014   • Chronic neutropenia ([facility]) 10/26/2010   • Postmenopausal atrophic vaginitis 2010   • Vitamin D deficiency disease 2010   • Diarrhea following gastrointestinal surgery 2010      Allergies:Other environmental    Current Outpatient Medications   Medication Sig Dispense Refill   • Glucosamine-Chondroit-Vit C-Mn (GLUCOSAMINE 1500 COMPLEX) Cap Take  by mouth.     • Cholestyramine Light 4 GM Powder Take 4 g by mouth every day. 3 Can 3   • DULoxetine (CYMBALTA) 20 MG Cap DR Particles Take 1 Cap by mouth every day. 90 Cap 3   • Cyanocobalamin (B-12) 1000 MCG SL Tab Place  under tongue.     • Multiple Vitamins-Minerals (CENTRUM SILVER ADULT 50+ PO) Take  by mouth.     • Flaxseed, Linseed, (FLAXSEED OIL) 1200 MG CAPS Take  by mouth.     • ascorbic acid (ASCORBIC ACID) 500 MG TABS Take 500 mg by mouth every day.     • vitamin D (CHOLECALCIFEROL) 1000 UNIT TABS Take 6,000 Units by mouth every day.     • CALCIUM 600 + D PO Take 1 Tab by mouth 2 Times a Day.       No current facility-administered medications for this visit.        Social History     Tobacco Use   • Smoking status: Former Smoker     Packs/day: 1.00     Years: 30.00     Pack years: 30.00     Types: Cigarettes     Quit date: 1992     Years since quittin.8   • Smokeless tobacco: Never Used   • Tobacco comment:    Substance Use Topics   • Alcohol use: Yes     Alcohol/week: 7.0 oz     Types: 14 Glasses of wine per week     Comment: Wine w/Dinner    • Drug use: No     Social History     Social History Narrative    , retired, spends half the year in [facility]       Family History   Problem Relation Age of Onset   • Cancer Mother    • Diabetes Father    • Heart Disease Father         Atrial Fib        ROS:     - Constitutional:  Negative  "for fever, chills, unexpected weight change, and fatigue/generalized weakness.    - HEENT:  Negative for headaches, vision changes, hearing changes, ear pain, ear discharge, rhinorrhea, sinus congestion, sore throat, and neck pain.      - Respiratory: Negative for cough, sputum production, chest congestion, dyspnea, wheezing, and crackles.      - Cardiovascular: Negative for chest pain, palpitations, orthopnea, and bilateral lower extremity edema.     - Gastrointestinal: Negative for heartburn, nausea, vomiting, abdominal pain, hematochezia, melena, diarrhea, constipation, and greasy/foul-smelling stools.     - Genitourinary: Negative for dysuria, polyuria, hematuria, pyuria, urinary urgency, and urinary incontinence.     - Musculoskeletal: Negative for myalgias, back pain, and joint pain.     - Skin: Negative for rash, itching, cyanotic skin color change.     - Neurological: Negative for dizziness, tingling, tremors, focal sensory deficit, focal weakness and headaches.     - Endo/Heme/Allergies: Does not bruise/bleed easily.     - Psychiatric/Behavioral: Negative for depression, suicidal/homicidal ideation and memory loss.          - NOTE: All other systems reviewed and are negative, except as in HPI.      Exam:    /92 (BP Location: Left arm, Patient Position: Sitting, BP Cuff Size: Adult)   Pulse 89   Temp 36.8 °C (98.2 °F) (Temporal)   Resp 14   Ht 1.651 m (5' 5\")   Wt 68.2 kg (150 lb 6.4 oz)   SpO2 98%  Body mass index is 25.03 kg/m².    General:  Well nourished, well developed female in NAD  LABS: 10/12/2020: Results reviewed and discussed with the patient, questions answered.    Please note that this dictation was created using voice recognition software. I have made every reasonable attempt to correct obvious errors, but I expect that there are errors of grammar and possibly content that I did not discover before finalizing the note.    Assessment/Plan:  1. Vitamin D deficiency disease  Adequately " flexibility on the left greater than right  LUMBAR/HIP Special Tests:    PELVIS/SI SPECIAL TESTS:   FUNCTIONAL TESTS:   PALPATION:  sore left TFL and superior gluteal on the left  SPINAL SEGMENTAL CONCLUSIONS:       Assessment & Plan   CLINICAL IMPRESSIONS  Medical Diagnosis: DDD lumbar spine    Treatment Diagnosis: DDD lumbar spine, left hip pain   Impression/Assessment: Patient is a 78 year old male with back pain complaints.  The following significant findings have been identified: Pain, Decreased ROM/flexibility, Decreased joint mobility, Decreased strength, Impaired gait, and Decreased activity tolerance. These impairments interfere with their ability to perform recreational activities, household mobility, and community mobility as compared to previous level of function.     Clinical Decision Making (Complexity):  Clinical Presentation: Stable/Uncomplicated  Clinical Presentation Rationale: based on medical and personal factors listed in PT evaluation  Clinical Decision Making (Complexity): Low complexity    PLAN OF CARE  Treatment Interventions:  Interventions: Therapeutic Activity, Therapeutic Exercise    Long Term Goals     PT Goal 1  Goal Identifier: walking  Goal Description: patient able to walk for 10 min with no back or hip pain or antalgic gait complaints  Rationale: to maximize safety and independence with performance of ADLs and functional tasks  Target Date: 01/22/24      Frequency of Treatment: once a week for 4 weeks then 2 times a month for 3 months  Duration of Treatment: 4 months    Recommended Referrals to Other Professionals:   Education Assessment:   Learner/Method: Patient;Listening;Reading;Demonstration;Pictures/Video;No Barriers to Learning    Risks and benefits of evaluation/treatment have been explained.   Patient/Family/caregiver agrees with Plan of Care.     Evaluation Time:     PT Eval, Low Complexity Minutes (27892): 15       Signing Clinician: Meri Bond, PT      [facility]  [facility]                                                                                   OUTPATIENT PHYSICAL THERAPY      PLAN OF TREATMENT FOR OUTPATIENT REHABILITATION   Patient's Last Name, First Name, Chester Vasquez YOB: 1945   Provider's Name   [facility]   Medical Record No.  [id number]     Onset Date: 10/25/23 (date that patient saw MD)  Start of Care Date: 10/25/23     Medical Diagnosis:  DDD lumbar spine      PT Treatment Diagnosis:  DDD lumbar spine, left hip pain Plan of Treatment  Frequency/Duration: once a week for 4 weeks then 2 times a month for 3 months/ 4 months    Certification date from 10/25/23 to 01/22/24         See note for plan of treatment details and functional goals     Meri Bond, PT                         I CERTIFY THE NEED FOR THESE SERVICES FURNISHED UNDER        THIS PLAN OF TREATMENT AND WHILE UNDER MY CARE .             Physician Signature               Date    X_____________________________________________________                    Referring Provider:  Jose Moyer      Initial Assessment  See Epic Evaluation- Start of Care Date: 10/25/23              PHYSICAL THERAPY EVALUATION  Type of Visit: Evaluation    See electronic medical record for Abuse and Falls Screening details.    Subjective       Presenting condition or subjective complaint: back pain, left gluteal pain to thigh with walking over the last two months.  Is playing tennis, bikes and walks  Date of onset: 10/25/23 (date that patient saw MD)    Relevant medical history: Osteoarthritis   Dates & types of surgery: HOLLY unsure of date    Prior diagnostic imaging/testing results: X-ray (stenosis)     Prior therapy history for the same diagnosis, illness or injury: Yes 2021?    Prior Level of Function  Transfers:   Ambulation:   ADL:   IADL:     Living Environment  Social support: With a significant other or spouse   Type of home:  controlled, patient will decrease her supplementation to 3 times a week.    2. Thrombocytopenia (HCC)  Stable, we will continue to follow    3. Chronic neutropenia (HCC)  Stable, we will continue to follow    4. Current moderate episode of major depressive disorder without prior episode (HCC)  Well-controlled with current meds.  I suggest she continue on this long-term until the pandemic is over    5. Need for vaccination  Given today  - INFLUENZA VACCINE, HIGH DOSE (65+ ONLY)          Apartment/condo   Stairs to enter the home: No       Ramp:     Stairs inside the home: No       Help at home:    Equipment owned:       Employment: Not Applicable    Hobbies/Interests: tennis, walking, biking    Patient goals for therapy: walking    Pain assessment:      Objective       Assessment & Plan   CLINICAL IMPRESSIONS  Medical Diagnosis: DDD lumbar spine    Treatment Diagnosis: DDD lumbar spine, left hip pain   Impression/Assessment:     Clinical Decision Making (Complexity):  Clinical Presentation: Stable/Uncomplicated  Clinical Presentation Rationale: based on medical and personal factors listed in PT evaluation  Clinical Decision Making (Complexity): Low complexity    PLAN OF CARE  Treatment Interventions:  Interventions: Therapeutic Activity, Therapeutic Exercise    Long Term Goals     PT Goal 1  Goal Identifier: walking  Goal Description: patient able to walk for 10 min with no back or hip pain or antalgic gait complaints  Rationale: to maximize safety and independence with performance of ADLs and functional tasks  Target Date: 01/22/24      Frequency of Treatment: once a week for 4 weeks then 2 times a month for 3 months  Duration of Treatment: 4 months    Recommended Referrals to Other Professionals:   Education Assessment:   Learner/Method: Patient;Listening;Reading;Demonstration;Pictures/Video;No Barriers to Learning    Risks and benefits of evaluation/treatment have been explained.   Patient/Family/caregiver agrees with Plan of Care.     Evaluation Time:     PT Eval, Low Complexity Minutes (71962): 15       Signing Clinician: Meri Bond, ROSE OHARA [facility]                                                                                   OUTPATIENT PHYSICAL THERAPY      PLAN OF TREATMENT FOR OUTPATIENT REHABILITATION   Patient's Last Name, First Name, Chester Vasquez YOB: 1945   Provider's Name   [facility]    Medical Record No.  [id number]     Onset Date: 10/25/23 (date that patient saw MD)  Start of Care Date: 10/25/23     Medical Diagnosis:  DDD lumbar spine      PT Treatment Diagnosis:  DDD lumbar spine, left hip pain Plan of Treatment  Frequency/Duration: once a week for 4 weeks then 2 times a month for 3 months/ 4 months    Certification date from 10/25/23 to 01/22/24         See note for plan of treatment details and functional goals     Meri Bond, PT                         I CERTIFY THE NEED FOR THESE SERVICES FURNISHED UNDER        THIS PLAN OF TREATMENT AND WHILE UNDER MY CARE .             Physician Signature               Date    X_____________________________________________________                    Referring Provider:  Jose Moyer      Initial Assessment  See Epic Evaluation- Start of Care Date: 10/25/23

## 2023-11-09 ENCOUNTER — THERAPY VISIT (OUTPATIENT)
Dept: PHYSICAL THERAPY | Facility: CLINIC | Age: 78
End: 2023-11-09
Payer: COMMERCIAL

## 2023-11-09 DIAGNOSIS — M54.42 BILATERAL LOW BACK PAIN WITH LEFT-SIDED SCIATICA: Primary | ICD-10-CM

## 2023-11-09 PROCEDURE — 97110 THERAPEUTIC EXERCISES: CPT | Mod: GP | Performed by: PHYSICAL THERAPIST

## 2023-11-30 ENCOUNTER — THERAPY VISIT (OUTPATIENT)
Dept: PHYSICAL THERAPY | Facility: CLINIC | Age: 78
End: 2023-11-30
Payer: COMMERCIAL

## 2023-11-30 DIAGNOSIS — M47.816 DEGENERATIVE JOINT DISEASE (DJD) OF LUMBAR SPINE: ICD-10-CM

## 2023-11-30 DIAGNOSIS — M54.42 BILATERAL LOW BACK PAIN WITH LEFT-SIDED SCIATICA: Primary | ICD-10-CM

## 2023-11-30 PROCEDURE — 97110 THERAPEUTIC EXERCISES: CPT | Mod: GP | Performed by: PHYSICAL THERAPIST

## 2023-12-11 ENCOUNTER — THERAPY VISIT (OUTPATIENT)
Dept: PHYSICAL THERAPY | Facility: CLINIC | Age: 78
End: 2023-12-11
Payer: COMMERCIAL

## 2023-12-11 DIAGNOSIS — M54.42 BILATERAL LOW BACK PAIN WITH LEFT-SIDED SCIATICA: Primary | ICD-10-CM

## 2023-12-11 PROCEDURE — 97110 THERAPEUTIC EXERCISES: CPT | Mod: GP | Performed by: PHYSICAL THERAPIST

## 2023-12-21 ENCOUNTER — THERAPY VISIT (OUTPATIENT)
Dept: PHYSICAL THERAPY | Facility: CLINIC | Age: 78
End: 2023-12-21
Payer: COMMERCIAL

## 2023-12-21 DIAGNOSIS — M54.42 BILATERAL LOW BACK PAIN WITH LEFT-SIDED SCIATICA: Primary | ICD-10-CM

## 2023-12-21 PROCEDURE — 97110 THERAPEUTIC EXERCISES: CPT | Mod: GP | Performed by: PHYSICAL THERAPIST

## 2023-12-28 ENCOUNTER — TELEPHONE (OUTPATIENT)
Dept: PHYSICAL THERAPY | Facility: CLINIC | Age: 78
End: 2023-12-28

## 2023-12-28 DIAGNOSIS — M54.42 BILATERAL LOW BACK PAIN WITH LEFT-SIDED SCIATICA, UNSPECIFIED CHRONICITY: Primary | ICD-10-CM

## 2024-01-25 ENCOUNTER — THERAPY VISIT (OUTPATIENT)
Dept: PHYSICAL THERAPY | Facility: CLINIC | Age: 79
End: 2024-01-25
Payer: COMMERCIAL

## 2024-01-25 DIAGNOSIS — M54.42 BILATERAL LOW BACK PAIN WITH LEFT-SIDED SCIATICA, UNSPECIFIED CHRONICITY: Primary | ICD-10-CM

## 2024-01-25 PROCEDURE — 97110 THERAPEUTIC EXERCISES: CPT | Mod: GP | Performed by: PHYSICAL THERAPIST

## 2024-01-25 PROCEDURE — 97112 NEUROMUSCULAR REEDUCATION: CPT | Mod: GP | Performed by: PHYSICAL THERAPIST

## 2024-01-25 NOTE — PROGRESS NOTES
[facility]                                                                                   OUTPATIENT PHYSICAL THERAPY    PLAN OF TREATMENT FOR OUTPATIENT REHABILITATION   Patient's Last Name, First Name, Chester Vasquez YOB: 1945   Provider's Name   [facility]   Medical Record No.  [id number]     Onset Date: 10/25/23 (date that patient saw MD)  Start of Care Date: 10/25/23     Medical Diagnosis:  DDD lumbar spine      PT Treatment Diagnosis:  DDD lumbar spine, left hip pain Plan of Treatment  Frequency/Duration: once a week for 4 weeks then 2 times a month for 3 months/ 4 months    Certification date from 01/23/24 to 04/21/24         See note for plan of treatment details and functional goals     Meri Bond, PT                          01/25/24 0500   Appointment Info   Signing clinician's name / credentials Meri Estrada   Total/Authorized Visits 10-12   Visits Used 6   Medical Diagnosis DDD lumbar spine   PT Tx Diagnosis DDD lumbar spine, left hip pain   Other pertinent information HOLLY, bilaterally   Quick Adds Certification   Progress Note/Certification   Start of Care Date 10/25/23   Onset of illness/injury or Date of Surgery 10/25/23  (date that patient saw MD)   Therapy Frequency once a motn   Predicted Duration 3 months   Certification date from 01/23/24   Certification date to 04/21/24   Progress Note Due Date 04/21/24   Progress Note Completed Date 01/25/24   GOALS   PT Goals 2   PT Goal 1   Goal Identifier walking   Goal Description patient able to walk for 10 min with no back or hip pain or antalgic gait complaints   Rationale to maximize safety and independence with performance of ADLs and functional tasks   Goal Progress was able to walk 4 miles without pain   Target Date 01/22/24   Date Met 01/25/24   PT Goal 2   Goal Identifier moving sit to stand   Goal Description able to sit for one hour and move to  standing without hip pain   Rationale to maximize safety and independence with performance of ADLs and functional tasks   Goal Progress currently has some posterior hip/thigh pain on the left with sitting for and hour   Target Date 03/25/24   Subjective Report   Subjective Report Has been able to walk 4 miles without pain.  Has had some left hip pain.  This began after a fall on the tennis course.  Was sore the next day.  Has felt better after swimming.   Objective Measures   Objective Measures Objective Measure 1;Objective Measure 2   Objective Measure 1   Objective Measure KURT   Details this causes some leg pain today, able to modify and just lie prone with a pillow   Objective Measure 2   Objective Measure hip flexor length   Details thigh is getting closer to the table on the left, no back pain   Treatment Interventions (PT)   Interventions Therapeutic Procedure/Exercise;Neuromuscular Re-education   Therapeutic Procedure/Exercise   Therapeutic Procedures: strength, endurance, ROM, flexibility minutes (03626) 33   PTRx Ther Proc 1 Single Leg Chair Squat   PTRx Ther Proc 1 - Details focus on the left leg 5 reps try two legged squat if painful on the left   PTRx Ther Proc 2 Prone Press Ups   PTRx Ther Proc 2 - Details 3 reps-try to do a few repetitions and try to keep the pelvis down on floor    PTRx Ther Proc 3 Supine Butterfly   PTRx Ther Proc 3 - Details 30 sec x 2   PTRx Ther Proc 4 Single Knee to Chest   PTRx Ther Proc 4 - Details 2x30 sec instructions not to push into groin pain with this   PTRx Ther Proc 5  Hip Flexor Stretch Yung Test Position   PTRx Ther Proc 5 - Details 30 sec off the bed 2 reps with verbal cues to bring right knee up   PTRx Ther Proc 6 Treatment for Anterior/Posterior Ilium Standing- Muscle Energy Technique (MET)   PTRx Ther Proc 6 - Details only do this after tennis right leg on the bench and should feel the stretch in the left hip hold 20-30 sec   PTRx Ther Proc 7 Prone Lumbar  Exercise #4 (Alternate Arm/Leg Extension)   PTRx Ther Proc 7 - Details 5 reps for 5 sec arms at shoulder level   PTRx Ther Proc 8 Clamshell with Theraband   PTRx Ther Proc 8 - Details 20 reps both side   PTRx Ther Proc 9 Sidelying Hip Abduction at Wall in Neutral and 45 degrees   PTRx Ther Proc 9 - Details verbal cues to help with trunk positioning 10   PTRx Ther Proc 10 Posterior Pelvic Tilt   PTRx Ther Proc 10 - Details 10 reps   Skilled Intervention manual and verbal   Patient Response/Progress understanding expressed   PTRx Ther Proc 11 Counter Stretch   PTRx Ther Proc 11 - Details [id number]   Neuromuscular Re-education   Neuromuscular re-ed of mvmt, balance, coord, kinesthetic sense, posture, proprioception minutes (69956) 5   PTRx Neuro Re-ed 1 Pallof Press   PTRx Neuro Re-ed 1 - Details 10 reps for 5 sec   PTRx Neuro Re-ed 2 Balance Single Leg Stance Supported and Unsupported   PTRx Neuro Re-ed 2 - Details 30 sec on the left 30 sec on the right   Education   Learner/Method Patient;Listening;Reading;Demonstration;Pictures/Video;No Barriers to Learning   Plan   Home program see printout from [facility]   Updates to plan of care add single leg strengthening   Plan for next session progress strengthening and ROM   Total Session Time   Timed Code Treatment Minutes 38   Total Treatment Time (sum of timed and untimed services) 38     I CERTIFY THE NEED FOR THESE SERVICES FURNISHED UNDER        THIS PLAN OF TREATMENT AND WHILE UNDER MY CARE     (Physician attestation of this document indicates review and certification of the therapy plan).              Referring Provider:  Jose Moyer    Initial Assessment  See Epic Evaluation- Start of Care Date: 10/25/23

## 2024-03-14 ENCOUNTER — THERAPY VISIT (OUTPATIENT)
Dept: PHYSICAL THERAPY | Facility: CLINIC | Age: 79
End: 2024-03-14
Payer: COMMERCIAL

## 2024-03-14 DIAGNOSIS — M54.42 BILATERAL LOW BACK PAIN WITH LEFT-SIDED SCIATICA, UNSPECIFIED CHRONICITY: Primary | ICD-10-CM

## 2024-03-14 PROCEDURE — 97110 THERAPEUTIC EXERCISES: CPT | Mod: GP | Performed by: PHYSICAL THERAPIST

## 2024-03-14 NOTE — PROGRESS NOTES
DISCHARGE  Reason for Discharge: Patient has met all goals.    Equipment Issued: [email or web address]    Discharge Plan: Patient to continue home program.   03/14/24 0500   Appointment Info   Signing clinician's name / credentials Meri Estrada   Total/Authorized Visits 10-12   Visits Used 7   Medical Diagnosis DDD lumbar spine   PT Tx Diagnosis DDD lumbar spine, left hip pain   Other pertinent information HOLLY, bilaterally   Quick Adds Certification   Progress Note/Certification   Start of Care Date 10/25/23   Onset of illness/injury or Date of Surgery 10/25/23  (date that patient saw MD)   Therapy Frequency once a month   Predicted Duration 3 months   Certification date from 01/23/24   Certification date to 04/21/24   Progress Note Due Date 04/21/24   Progress Note Completed Date 03/14/24   GOALS   PT Goals 2   PT Goal 1   Goal Identifier walking   Goal Description patient able to walk for 10 min with no back or hip pain or antalgic gait complaints   Rationale to maximize safety and independence with performance of ADLs and functional tasks   Goal Progress was able to walk 4 miles without pain   Target Date 01/22/24   Date Met 01/25/24   PT Goal 2   Goal Identifier moving sit to stand   Goal Description able to sit for one hour and move to standing without hip pain   Rationale to maximize safety and independence with performance of ADLs and functional tasks   Goal Progress currently has some posterior hip/thigh pain on the left with sitting for and hour   Target Date 03/25/24   Date Met 03/14/24   Subjective Report   Subjective Report Back and leg have felt good.  Swimming 40 lengths. Playing tennis.   Left still feels a bit weak but no pain.   Objective Measures   Objective Measures Objective Measure 1;Objective Measure 2   Objective Measure 1   Objective Measure KURT   Details no leg pain with this but some tingling to left foot   Objective Measure 2   Objective Measure hip flexor length   Details Stretch felt in  groin today   Treatment Interventions (PT)   Interventions Therapeutic Procedure/Exercise;Neuromuscular Re-education   Therapeutic Procedure/Exercise   Therapeutic Procedures: strength, endurance, ROM, flexibility minutes (57013) 35   PTRx Ther Proc 1 Standing Gastroc Stretch   PTRx Ther Proc 1 - Details 30 sec   PTRx Ther Proc 2 Single Leg Chair Squat   PTRx Ther Proc 2 - Details focus on the left leg 5 reps try two legged squat if painful on the left   PTRx Ther Proc 3 Prone Press Ups   PTRx Ther Proc 3 - Details 3 reps-try to do a few repetitions and try to keep the pelvis down on floor    PTRx Ther Proc 4 Supine Butterfly   PTRx Ther Proc 4 - Details 30 sec x 2   PTRx Ther Proc 5 Single Knee to Chest   PTRx Ther Proc 5 - Details 2x30 sec instructions not to push into groin pain with this   PTRx Ther Proc 6  Hip Flexor Stretch Yung Test Position   PTRx Ther Proc 6 - Details 30 sec off the bed 2 reps with verbal cues to bring right knee up   PTRx Ther Proc 7 Treatment for Anterior/Posterior Ilium Standing- Muscle Energy Technique (MET)   PTRx Ther Proc 7 - Details only do this after tennis right leg on the bench and should feel the stretch in the left hip hold 20-30 sec   PTRx Ther Proc 8 Prone Lumbar Exercise #4 (Alternate Arm/Leg Extension)   PTRx Ther Proc 8 - Details 5 reps for 5 sec arms at shoulder level   PTRx Ther Proc 9 Clamshell with Theraband   PTRx Ther Proc 9 - Details 20 reps both side   PTRx Ther Proc 10 Sidelying Hip Abduction at Wall in Neutral and 45 degrees   PTRx Ther Proc 10 - Details verbal cues to help with trunk positioning 10   PTRx Ther Proc 11 Posterior Pelvic Tilt   PTRx Ther Proc 11 - Details 10 reps   Skilled Intervention manual and verbal   Patient Response/Progress understanding expressed   PTRx Ther Proc 12 Counter Stretch   PTRx Ther Proc 12 - Details [id number]   Neuromuscular Re-education   Neuromuscular re-ed of mvmt, balance, coord, kinesthetic sense, posture, proprioception  minutes ([id number]) 2   PTRx Neuro Re-ed 1 Pallof Press   PTRx Neuro Re-ed 1 - Details HEP   PTRx Neuro Re-ed 2 Balance Single Leg Stance Supported and Unsupported   PTRx Neuro Re-ed 2 - Details 30 sec on the left 30 sec on the right   Education   Learner/Method Patient;Listening;Reading;Demonstration;Pictures/Video;No Barriers to Learning   Plan   Home program see printout from [facility]   Updates to plan of care add stretch for calves, DC   Total Session Time   Timed Code Treatment Minutes 37   Total Treatment Time (sum of timed and untimed services) 37       Referring Provider:  Jose Moyer

## 2025-05-03 ENCOUNTER — HEALTH MAINTENANCE LETTER (OUTPATIENT)
Age: 80
End: 2025-05-03

## (undated) DEVICE — DRSG AQUACEL AG 3.5X9.75" HYDROFIBER 412011

## (undated) DEVICE — GLOVE PROTEXIS BLUE W/NEU-THERA 8.0  2D73EB80

## (undated) DEVICE — GLOVE PROTEXIS POWDER FREE 7.5 ORTHOPEDIC 2D73ET75

## (undated) DEVICE — GLOVE PROTEXIS W/NEU-THERA 7.5  2D73TE75

## (undated) DEVICE — GLOVE PROTEXIS W/NEU-THERA 8.0  2D73TE80

## (undated) DEVICE — DRAPE C-ARM 60X42" 1013

## (undated) DEVICE — BLADE SAW SAGITTAL STRK 25X79.5X1.24MM 4/2000 2108-318-000

## (undated) DEVICE — SOL NACL 0.9% IRRIG 1000ML BOTTLE 2F7124

## (undated) DEVICE — PACK TOTAL HIP W/U DRAPE SOP15HUFSC

## (undated) DEVICE — PACK CYSTO UMMC CUSTOM

## (undated) DEVICE — SOLUTION WOUND CLEANSING 3/4OZ 10% PVP EA-L3011FB-50

## (undated) DEVICE — SOL WATER IRRIG 1000ML BOTTLE 07139-09

## (undated) DEVICE — ESU ELEC CUTTING LOOP BIPOLAR 24/26FR 0.35MM  27040GP1/6

## (undated) DEVICE — SU VICRYL 2-0 CT-1 27" UND J259H

## (undated) DEVICE — SU ETHIBOND 1 CT-1 30" X425H

## (undated) DEVICE — SPECIMEN CONTAINER 5OZ STERILE 2600SA

## (undated) DEVICE — ESU GROUND PAD UNIVERSAL W/O CORD

## (undated) DEVICE — GOWN LG DISP 9515

## (undated) DEVICE — DRILL BIT BIOM QUICK CONNECTING RING LOCK 3.2X20MM 31-323220

## (undated) DEVICE — DRAPE STERI TOWEL LG 1010

## (undated) DEVICE — SOL NACL 0.9% IRRIG 3000ML BAG 2B7477

## (undated) DEVICE — SOL NACL 0.9% INJ 1000ML BAG 2B1324X

## (undated) DEVICE — GOWN IMPERVIOUS SPECIALTY XLG/XLONG 32474

## (undated) DEVICE — SU VICRYL 1 CTX 36" J371H

## (undated) DEVICE — LINEN TOWEL PACK X5 5464

## (undated) DEVICE — BONE CLEANING TIP INTERPULSE  0210-010-000

## (undated) DEVICE — EVACUATOR BLADDER UROVAC LATEX M0067301250

## (undated) DEVICE — CATH FOLEY 3WAY 24FR 30ML LATEX 0167SI24

## (undated) DEVICE — BAG URINARY DRAIN LUBRISIL IC 4000ML LF 253509A

## (undated) DEVICE — SUCTION MANIFOLD DORNOCH ULTRA CART UL-CL500

## (undated) DEVICE — MANIFOLD NEPTUNE 4 PORT 700-20

## (undated) DEVICE — SUCTION IRR SYSTEM W/O TIP INTERPULSE HANDPIECE 0210-100-000

## (undated) DEVICE — PREP CHLORAPREP 26ML TINTED ORANGE  260815

## (undated) DEVICE — SU MONOCRYL 3-0 PS-2 27" Y427H

## (undated) DEVICE — DRAPE CONVERTORS U-DRAPE 60X72" 8476

## (undated) DEVICE — SOL WATER IRRIG 1000ML BOTTLE 2F7114

## (undated) DEVICE — PAD CHUX UNDERPAD 30X30"

## (undated) DEVICE — DRAPE SHEET REV FOLD 3/4 9349

## (undated) DEVICE — DRAPE IOBAN INCISE 23X17" 6650EZ

## (undated) RX ORDER — PROPOFOL 10 MG/ML
INJECTION, EMULSION INTRAVENOUS
Status: DISPENSED
Start: 2022-02-14

## (undated) RX ORDER — TRANEXAMIC ACID 650 MG/1
TABLET ORAL
Status: DISPENSED
Start: 2022-02-14

## (undated) RX ORDER — MAGNESIUM HYDROXIDE 1200 MG/15ML
LIQUID ORAL
Status: DISPENSED
Start: 2017-03-08

## (undated) RX ORDER — SODIUM CHLORIDE 9 MG/ML
INJECTION, SOLUTION INTRAVENOUS
Status: DISPENSED
Start: 2017-03-08

## (undated) RX ORDER — FENTANYL CITRATE 0.05 MG/ML
INJECTION, SOLUTION INTRAMUSCULAR; INTRAVENOUS
Status: DISPENSED
Start: 2022-02-14

## (undated) RX ORDER — CEFAZOLIN SODIUM/WATER 2 G/20 ML
SYRINGE (ML) INTRAVENOUS
Status: DISPENSED
Start: 2022-02-14

## (undated) RX ORDER — HYDROMORPHONE HYDROCHLORIDE 1 MG/ML
INJECTION, SOLUTION INTRAMUSCULAR; INTRAVENOUS; SUBCUTANEOUS
Status: DISPENSED
Start: 2022-02-14

## (undated) RX ORDER — HYDROMORPHONE HYDROCHLORIDE 1 MG/ML
INJECTION, SOLUTION INTRAMUSCULAR; INTRAVENOUS; SUBCUTANEOUS
Status: DISPENSED
Start: 2017-03-08

## (undated) RX ORDER — VANCOMYCIN HYDROCHLORIDE 1 G/20ML
INJECTION, POWDER, LYOPHILIZED, FOR SOLUTION INTRAVENOUS
Status: DISPENSED
Start: 2022-02-14

## (undated) RX ORDER — FENTANYL CITRATE 50 UG/ML
INJECTION, SOLUTION INTRAMUSCULAR; INTRAVENOUS
Status: DISPENSED
Start: 2022-02-14

## (undated) RX ORDER — FENTANYL CITRATE 50 UG/ML
INJECTION, SOLUTION INTRAMUSCULAR; INTRAVENOUS
Status: DISPENSED
Start: 2017-03-08

## (undated) RX ORDER — CEFAZOLIN SODIUM 2 G/100ML
INJECTION, SOLUTION INTRAVENOUS
Status: DISPENSED
Start: 2017-03-08

## (undated) RX ORDER — ACETAMINOPHEN 325 MG/1
TABLET ORAL
Status: DISPENSED
Start: 2017-03-08

## (undated) RX ORDER — ACETAMINOPHEN 500 MG
TABLET ORAL
Status: DISPENSED
Start: 2022-02-14